# Patient Record
Sex: FEMALE | Race: WHITE | NOT HISPANIC OR LATINO | Employment: OTHER | URBAN - METROPOLITAN AREA
[De-identification: names, ages, dates, MRNs, and addresses within clinical notes are randomized per-mention and may not be internally consistent; named-entity substitution may affect disease eponyms.]

---

## 2017-02-21 ENCOUNTER — GENERIC CONVERSION - ENCOUNTER (OUTPATIENT)
Dept: OTHER | Facility: OTHER | Age: 72
End: 2017-02-21

## 2017-03-27 ENCOUNTER — ALLSCRIPTS OFFICE VISIT (OUTPATIENT)
Dept: OTHER | Facility: OTHER | Age: 72
End: 2017-03-27

## 2017-06-29 ENCOUNTER — ALLSCRIPTS OFFICE VISIT (OUTPATIENT)
Dept: OTHER | Facility: OTHER | Age: 72
End: 2017-06-29

## 2017-06-29 DIAGNOSIS — R53.83 OTHER FATIGUE: ICD-10-CM

## 2017-06-29 DIAGNOSIS — R91.1 SOLITARY PULMONARY NODULE: ICD-10-CM

## 2017-06-29 DIAGNOSIS — E78.5 HYPERLIPIDEMIA: ICD-10-CM

## 2017-09-29 ENCOUNTER — GENERIC CONVERSION - ENCOUNTER (OUTPATIENT)
Dept: OTHER | Facility: OTHER | Age: 72
End: 2017-09-29

## 2017-12-21 ENCOUNTER — ALLSCRIPTS OFFICE VISIT (OUTPATIENT)
Dept: OTHER | Facility: OTHER | Age: 72
End: 2017-12-21

## 2017-12-22 NOTE — PROGRESS NOTES
Assessment   1  COPD, moderate (496) (J44 9)   2  Oral thrush (112 0) (B37 0)   3  Low back pain (724 2) (M54 5)    Plan   COPD, moderate    · Combivent Respimat  MCG/ACT Inhalation Aerosol Solution; INHALE 1    PUFF 4 TIMES DAILY (MAXIMUM OF 6 PUFFS IN 24 HOURS)   · PredniSONE 10 MG Oral Tablet; 4 tablets daily for 3 days, then    3 tablets daily for 3 days, then    2 tablets daily for 3 days, then    1 tablet daily for 3 days   · Stiolto Respimat 2 5-2 5 MCG/ACT Inhalation Aerosol Solution; USE 1 PUFF DAILY  Low back pain    · Hydrocodone-Acetaminophen  MG Oral Tablet; TAKE 1 TABLET EVERY 4    TO 6 HOURS AS NEEDED FOR PAIN  Oral thrush    · Clotrimazole 10 MG Mouth/Throat Javier; ALLOW 1 TO DISSOLVE SLOWLY IN    MOUTH 5 TIMES DAILY AS DIRECTED  Other acute sinusitis    · Doxycycline Hyclate 100 MG Oral Capsule; TAKE 1 CAPSULE EVERY 12 HOURS    DAILY    Discussion/Summary   Possible side effects of new medications were reviewed with the patient/guardian today  The treatment plan was reviewed with the patient/guardian  The patient/guardian understands and agrees with the treatment plan      Chief Complaint   PT STATES WAS RECENTLY HOSPITALIZED FOR COPD  STATES HAS CUT BACK ON SMOKING  Patient is here today for follow up of chronic conditions described in HPI  History of Present Illness   She was recently hospitalized for a COPD exacerbation  She was discharged on steroids and antiviral medication  She was given a rescue inhaler, but she does not care for it as it causes her to have palpitations  Currently, she has no shortness of breath, wheezing, cough, or sputum production  She has drastically cut down on the amount that she smokes with plans to discontinue it altogether    has chronic back pain for which she takes hydrocodone  She has no side effects from her medication and she gets good relief from her current regimen  She has a son treatment agreement in the chart   I have queried the [place] prescription drug monitoring program and we are the only provider prescribing this medication for her  Review of Systems        Constitutional: No fever, no chills, feels well, no tiredness, no recent weight gain or weight loss  Eyes: No complaints of eye pain, no red eyes, no eyesight problems, no discharge, no dry eyes, no itching of eyes  ENT: no complaints of earache, no loss of hearing, no nose bleeds, no nasal discharge, no sore throat, no hoarseness  Cardiovascular: No complaints of slow heart rate, no fast heart rate, no chest pain, no palpitations, no leg claudication, no lower extremity edema  Respiratory: as noted in HPI  Gastrointestinal: No complaints of abdominal pain, no constipation, no nausea or vomiting, no diarrhea, no bloody stools  Genitourinary: No complaints of dysuria, no incontinence, no pelvic pain, no dysmenorrhea, no vaginal discharge or bleeding  Musculoskeletal: No complaints of arthralgias, no myalgias, no joint swelling or stiffness, no limb pain or swelling  Integumentary: No complaints of skin rash or lesions, no itching, no skin wounds, no breast pain or lump  Neurological: No complaints of headache, no confusion, no convulsions, no numbness, no dizziness or fainting, no tingling, no limb weakness, no difficulty walking  Psychiatric: Not suicidal, no sleep disturbance, no anxiety or depression, no change in personality, no emotional problems  Endocrine: No complaints of proptosis, no hot flashes, no muscle weakness, no deepening of the voice, no feelings of weakness  Hematologic/Lymphatic: No complaints of swollen glands, no swollen glands in the neck, does not bleed easily, does not bruise easily  Active Problems   1  Abdominal pain, epigastric (789 06) (R10 13)   2  Abnormal mammogram (793 80) (R92 8)   3  Acute sinusitis (461 9) (J01 90)   4  Depression with anxiety (300 4) (F41 8)   5   GERD (gastroesophageal reflux disease) (530 81) (K21 9)   6  Denied: History of substance abuse   7  Hyperlipidemia (272 4) (E78 5)   8  Hypertension (401 9) (I10)   9  Low back pain (724 2) (M54 5)   10  Major depression, recurrent, chronic (296 30) (F33 9)   11  Memory loss (780 93) (R41 3)   12  Other acute sinusitis (461 8) (J01 80)   13  Other fatigue (780 79) (R53 83)   14  Palpitations (785 1) (R00 2)   15  Pulmonary nodule seen on imaging study (793 11) (R91 1)   16  Right hip pain (719 45) (M25 551)   17  Shortness of breath (786 05) (R06 02)   18  Thyroid nodule (241 0) (E04 1)   19  Tobacco use (305 1) (Z72 0)   20  Transient alteration of awareness (780 02) (R40 4)   21  Vision changes (368 9) (H53 9)   22  Vitamin D deficiency (268 9) (E55 9)    Past Medical History   1  History of Elevated liver enzymes (790 5) (R74 8)   2  History of fracture of rib (V15 51) (Z87 81)   3  Denied: History of substance abuse    Surgical History   1  History of Appendectomy   2  History of Back Surgery   3  History of Cholecystectomy   4  History of Foot Surgery   5  History of Hip Surgery   6  History of Total Abdominal Hysterectomy    Family History   Mother    1  Family history of cardiac disorder (V17 49) (Z82 49)   2  Family history of type 2 diabetes mellitus (V18 0) (Z83 3)  Father    3  Family history of cardiac disorder (V17 49) (Z82 49)  Family History    4  Denied: Family history of mental disorder   5  Denied: Family history of substance abuse    Social History    · Current every day smoker (305 1) (F17 200)   · Tobacco use (305 1) (Z72 0)    Current Meds    1  Atenolol 25 MG Oral Tablet; TAKE 1 TABLET DAILY AS DIRECTED; Therapy: [id number] to [phone])  Requested for: 24Apr2017; Last     Rx:[id number] Ordered   2  Atorvastatin Calcium 40 MG Oral Tablet; Take 1 tablet daily  Requested for: 37JTZ3434;     Last Rx:97Vfc3094 Ordered   3   DiazePAM 5 MG Oral Tablet; take 1 tablet by mouth three times a day if needed; Therapy: 94YFK4862 to (Evaluate:50Uyg2538)  Requested for: 93Xjo8711; Last     Rx:12Mkg7389 Ordered   4  Effexor XR 75 MG Oral Capsule Extended Release 24 Hour; TAKE 3 CAPSULES DAILY     (TOTAL DAILY DOSE  MG); Therapy: 46GZZ9243 to (Last Rx:06Mar2017)  Requested for: 41PAJ0021 Ordered   5  Hydrocodone-Acetaminophen  MG Oral Tablet; TAKE 1 TABLET EVERY 4 TO 6     HOURS AS NEEDED FOR PAIN; Last Rx:06Rdo1170 Ordered   6  Magnesium 400 MG Oral Tablet; Take 1 tablet daily; Therapy: (Kristen Galloway) to Recorded   7  PriLOSEC 20 MG CPDR; TAKE 1 CAPSULE TWICE DAILY; Therapy: (Chester Laverne) to Recorded   8  Venlafaxine HCl ER 75 MG Oral Capsule Extended Release 24 Hour; TAKE 3 CAPSULES     DAILY (TOTAL DAILY DOSE  MG); Therapy: 52AOF3276 to (Evaluate:09Apr2018)  Requested for: 33AVT2729; Last     Rx:18Yyr1979 Ordered   9  Vitamin D (Ergocalciferol) 04521 UNIT Oral Capsule; TAKE 1 CAPSULE WEEKLY; Therapy: 12Apr2016 to (Last Rx:12Apr2016)  Requested for: 24Ygv1864 Ordered    Allergies   1  No Known Drug Allergies    Vitals   Vital Signs    Recorded: 21Dec2017 09:53AM   Heart Rate 81   Respiration 16   Systolic 530   Diastolic 80   Height 5 ft 4 in   Weight 151 lb 2 oz   BMI Calculated 25 94   BSA Calculated 1 74   O2 Saturation 92     Physical Exam        Constitutional      General appearance: No acute distress, well appearing and well nourished  Pulmonary      Respiratory effort: No increased work of breathing or signs of respiratory distress  Auscultation of lungs: Clear to auscultation  Cardiovascular      Auscultation of heart: Normal rate and rhythm, normal S1 and S2, without murmurs  Examination of extremities for edema and/or varicosities: Normal        Abdomen      Abdomen: Non-tender, no masses  Liver and spleen: No hepatomegaly or splenomegaly            Signatures    Electronically signed by : Macy Valiente MD; Dec 21 2017 10:34AM EST                       (Author)

## 2018-01-10 NOTE — MISCELLANEOUS
February 21, 2017    To Whom It May Concern,    This is to serve as a letter of medical necessity  Ms Olvin Miguel is taking venlafaxine 225 mg daily for a history of depression  She has failed a number of other antidepressants  with other providers  She still had anxiety and depression on lower doses of venlafaxine, but her symptoms are well controlled on 225 mg  Please contact our office with any questions or concerns  Sincerely,    KAYLEN Orr          Electronically signed by:Serge Saab MD  Feb 21 2017  2:46PM EST

## 2018-01-14 VITALS
BODY MASS INDEX: 25.99 KG/M2 | RESPIRATION RATE: 15 BRPM | SYSTOLIC BLOOD PRESSURE: 142 MMHG | WEIGHT: 152.25 LBS | HEART RATE: 66 BPM | OXYGEN SATURATION: 95 % | HEIGHT: 64 IN | DIASTOLIC BLOOD PRESSURE: 80 MMHG

## 2018-01-14 VITALS
DIASTOLIC BLOOD PRESSURE: 64 MMHG | HEART RATE: 56 BPM | OXYGEN SATURATION: 95 % | RESPIRATION RATE: 15 BRPM | HEIGHT: 64 IN | WEIGHT: 148.25 LBS | BODY MASS INDEX: 25.31 KG/M2 | SYSTOLIC BLOOD PRESSURE: 130 MMHG

## 2018-01-22 VITALS
BODY MASS INDEX: 25.86 KG/M2 | RESPIRATION RATE: 13 BRPM | HEIGHT: 64 IN | DIASTOLIC BLOOD PRESSURE: 72 MMHG | SYSTOLIC BLOOD PRESSURE: 134 MMHG | WEIGHT: 151.5 LBS | OXYGEN SATURATION: 97 % | HEART RATE: 70 BPM

## 2018-01-23 VITALS
BODY MASS INDEX: 25.8 KG/M2 | DIASTOLIC BLOOD PRESSURE: 80 MMHG | RESPIRATION RATE: 16 BRPM | HEIGHT: 64 IN | HEART RATE: 81 BPM | SYSTOLIC BLOOD PRESSURE: 128 MMHG | WEIGHT: 151.13 LBS | OXYGEN SATURATION: 92 %

## 2018-02-19 DIAGNOSIS — F41.9 ANXIETY: Primary | ICD-10-CM

## 2018-02-19 RX ORDER — DIAZEPAM 5 MG/1
1 TABLET ORAL 3 TIMES DAILY PRN
COMMUNITY
Start: 2016-02-24 | End: 2018-02-19 | Stop reason: SDUPTHER

## 2018-02-19 RX ORDER — DIAZEPAM 5 MG/1
5 TABLET ORAL 3 TIMES DAILY PRN
Qty: 30 TABLET | Refills: 5 | Status: SHIPPED | OUTPATIENT
Start: 2018-02-19 | End: 2018-02-27 | Stop reason: SDUPTHER

## 2018-02-26 ENCOUNTER — TELEPHONE (OUTPATIENT)
Dept: FAMILY MEDICINE CLINIC | Facility: CLINIC | Age: 73
End: 2018-02-26

## 2018-02-26 DIAGNOSIS — F41.9 ANXIETY: ICD-10-CM

## 2018-02-27 RX ORDER — DIAZEPAM 5 MG/1
5 TABLET ORAL 3 TIMES DAILY PRN
Qty: 90 TABLET | Refills: 5 | Status: SHIPPED | OUTPATIENT
Start: 2018-02-27 | End: 2018-03-22 | Stop reason: SDUPTHER

## 2018-03-21 RX ORDER — OMEPRAZOLE 20 MG/1
1 CAPSULE, DELAYED RELEASE ORAL 2 TIMES DAILY
COMMUNITY
End: 2018-03-22 | Stop reason: SDUPTHER

## 2018-03-21 RX ORDER — PREDNISONE 10 MG/1
TABLET ORAL
COMMUNITY
Start: 2017-12-21 | End: 2018-06-11

## 2018-03-21 RX ORDER — ATENOLOL 25 MG/1
1 TABLET ORAL DAILY
COMMUNITY
Start: 2015-10-01 | End: 2018-03-22

## 2018-03-21 RX ORDER — OSELTAMIVIR PHOSPHATE 30 MG/1
CAPSULE ORAL
Refills: 0 | COMMUNITY
Start: 2017-12-15 | End: 2018-06-11

## 2018-03-21 RX ORDER — THIAMINE HCL 100 MG
1 TABLET ORAL DAILY
COMMUNITY

## 2018-03-21 RX ORDER — NICOTINE 21 MG/24HR
PATCH, TRANSDERMAL 24 HOURS TRANSDERMAL
Refills: 0 | COMMUNITY
Start: 2017-12-15 | End: 2018-06-11

## 2018-03-21 RX ORDER — HYDROCODONE BITARTRATE AND ACETAMINOPHEN 10; 325 MG/1; MG/1
1 TABLET ORAL
COMMUNITY
End: 2018-03-22 | Stop reason: SDUPTHER

## 2018-03-21 RX ORDER — IPRATROPIUM/ALBUTEROL SULFATE 20-100 MCG
MIST INHALER (GRAM) INHALATION
Refills: 0 | COMMUNITY
Start: 2018-01-22 | End: 2019-10-08 | Stop reason: SDUPTHER

## 2018-03-21 RX ORDER — ATORVASTATIN CALCIUM 40 MG/1
1 TABLET, FILM COATED ORAL DAILY
COMMUNITY
End: 2018-09-11

## 2018-03-21 RX ORDER — ERGOCALCIFEROL 1.25 MG/1
1 CAPSULE ORAL WEEKLY
COMMUNITY
Start: 2016-04-12 | End: 2018-03-22 | Stop reason: SDUPTHER

## 2018-03-21 RX ORDER — DOXYCYCLINE HYCLATE 100 MG/1
1 CAPSULE ORAL EVERY 12 HOURS
COMMUNITY
Start: 2016-01-04 | End: 2018-06-11

## 2018-03-21 RX ORDER — VENLAFAXINE HYDROCHLORIDE 75 MG/1
CAPSULE, EXTENDED RELEASE ORAL
COMMUNITY
Start: 2017-07-10 | End: 2018-04-25 | Stop reason: SDUPTHER

## 2018-03-22 ENCOUNTER — OFFICE VISIT (OUTPATIENT)
Dept: FAMILY MEDICINE CLINIC | Facility: CLINIC | Age: 73
End: 2018-03-22
Payer: COMMERCIAL

## 2018-03-22 VITALS
DIASTOLIC BLOOD PRESSURE: 82 MMHG | SYSTOLIC BLOOD PRESSURE: 134 MMHG | RESPIRATION RATE: 16 BRPM | BODY MASS INDEX: 27.18 KG/M2 | HEART RATE: 63 BPM | OXYGEN SATURATION: 95 % | HEIGHT: 64 IN | WEIGHT: 159.2 LBS

## 2018-03-22 DIAGNOSIS — E78.49 OTHER HYPERLIPIDEMIA: ICD-10-CM

## 2018-03-22 DIAGNOSIS — F41.9 ANXIETY: ICD-10-CM

## 2018-03-22 DIAGNOSIS — I10 ESSENTIAL HYPERTENSION: Primary | ICD-10-CM

## 2018-03-22 DIAGNOSIS — R52 PAIN: ICD-10-CM

## 2018-03-22 DIAGNOSIS — E55.9 VITAMIN D DEFICIENCY: Primary | ICD-10-CM

## 2018-03-22 DIAGNOSIS — K21.9 GASTROESOPHAGEAL REFLUX DISEASE, ESOPHAGITIS PRESENCE NOT SPECIFIED: ICD-10-CM

## 2018-03-22 DIAGNOSIS — Z12.39 SCREENING FOR BREAST CANCER: Primary | ICD-10-CM

## 2018-03-22 DIAGNOSIS — Z13.89 SCREENING FOR GENITOURINARY CONDITION: ICD-10-CM

## 2018-03-22 DIAGNOSIS — Z13.89 SCREENING FOR NEUROLOGICAL CONDITION: ICD-10-CM

## 2018-03-22 PROCEDURE — 1101F PT FALLS ASSESS-DOCD LE1/YR: CPT | Performed by: FAMILY MEDICINE

## 2018-03-22 PROCEDURE — 99214 OFFICE O/P EST MOD 30 MIN: CPT | Performed by: FAMILY MEDICINE

## 2018-03-22 RX ORDER — HYDROCODONE BITARTRATE AND ACETAMINOPHEN 10; 325 MG/1; MG/1
1 TABLET ORAL EVERY 4 HOURS PRN
Qty: 150 TABLET | Refills: 0 | Status: SHIPPED | OUTPATIENT
Start: 2018-03-22 | End: 2018-03-22 | Stop reason: SDUPTHER

## 2018-03-22 RX ORDER — DIAZEPAM 5 MG/1
5 TABLET ORAL 3 TIMES DAILY PRN
Qty: 90 TABLET | Refills: 5 | Status: SHIPPED | OUTPATIENT
Start: 2018-03-22 | End: 2018-08-27 | Stop reason: SDUPTHER

## 2018-03-22 RX ORDER — ERGOCALCIFEROL 1.25 MG/1
50000 CAPSULE ORAL WEEKLY
Qty: 12 CAPSULE | Refills: 3 | Status: SHIPPED | OUTPATIENT
Start: 2018-03-22 | End: 2018-06-11

## 2018-03-22 RX ORDER — HYDROCODONE BITARTRATE AND ACETAMINOPHEN 10; 325 MG/1; MG/1
1 TABLET ORAL EVERY 4 HOURS PRN
Qty: 150 TABLET | Refills: 0 | Status: SHIPPED | OUTPATIENT
Start: 2018-03-22 | End: 2018-06-11 | Stop reason: SDUPTHER

## 2018-03-22 RX ORDER — OMEPRAZOLE 20 MG/1
20 CAPSULE, DELAYED RELEASE ORAL 2 TIMES DAILY
Qty: 180 CAPSULE | Refills: 3 | Status: SHIPPED | OUTPATIENT
Start: 2018-03-22 | End: 2019-01-17 | Stop reason: SDUPTHER

## 2018-03-22 RX ORDER — HYDROCODONE BITARTRATE AND ACETAMINOPHEN 10; 325 MG/1; MG/1
1 TABLET ORAL EVERY 6 HOURS PRN
Qty: 120 TABLET | Refills: 0 | Status: SHIPPED | OUTPATIENT
Start: 2018-03-22 | End: 2018-03-22 | Stop reason: SDUPTHER

## 2018-03-22 RX ORDER — METOPROLOL SUCCINATE 50 MG/1
25 TABLET, EXTENDED RELEASE ORAL DAILY
Qty: 30 TABLET | Refills: 5 | Status: SHIPPED | OUTPATIENT
Start: 2018-03-22 | End: 2018-09-11

## 2018-03-22 NOTE — PROGRESS NOTES
Assessment/Plan:    No problem-specific Assessment & Plan notes found for this encounter  Diagnoses and all orders for this visit:    Essential hypertension  -     metoprolol succinate (TOPROL-XL) 50 mg 24 hr tablet; Take 0 5 tablets (25 mg total) by mouth daily  -     Lipid panel; Future  -     Comprehensive metabolic panel; Future  -     TSH, 3rd generation; Future  -     Microalbumin / creatinine urine ratio    Other hyperlipidemia  -     Lipid panel; Future  -     Comprehensive metabolic panel; Future  -     TSH, 3rd generation; Future  -     Microalbumin / creatinine urine ratio    Pain  -     Discontinue: HYDROcodone-acetaminophen (NORCO)  mg per tablet; Take 1 tablet by mouth every 6 (six) hours as needed for moderate pain Max Daily Amount: 4 tablets  -     Discontinue: HYDROcodone-acetaminophen (NORCO)  mg per tablet; Take 1 tablet by mouth every 4 (four) hours as needed for moderate pain Max Daily Amount: 6 tablets  -     Discontinue: HYDROcodone-acetaminophen (NORCO)  mg per tablet; Take 1 tablet by mouth every 4 (four) hours as needed for moderate pain Max Daily Amount: 6 tablets  -     HYDROcodone-acetaminophen (NORCO)  mg per tablet; Take 1 tablet by mouth every 4 (four) hours as needed for moderate pain Max Daily Amount: 6 tablets    Anxiety  -     diazepam (VALIUM) 5 mg tablet; Take 1 tablet (5 mg total) by mouth 3 (three) times a day as needed for anxiety    Screening for genitourinary condition    Screening for neurological condition    Other orders  -     ergocalciferol (VITAMIN D2) 50,000 units; Take 1 capsule by mouth once a week  -     venlafaxine (EFFEXOR-XR) 75 mg 24 hr capsule; Take by mouth  -     Tiotropium Bromide-Olodaterol (STIOLTO RESPIMAT) 2 5-2 5 MCG/ACT AERS; Inhale 1 puff daily  -     omeprazole (PRILOSEC) 20 mg delayed release capsule; Take 1 capsule by mouth 2 (two) times a day  -     predniSONE 10 mg tablet;  Take by mouth  -     oseltamivir (TAMIFLU) 30 MG capsule; take 1 capsule by mouth twice a day for 3 days  -     nicotine (NICODERM CQ) 21 mg/24 hr TD 24 hr patch; place 1 patch topically ON THE SKIN daily  -     Magnesium 500 MG TABS; Take 1 tablet by mouth daily  -     COMBIVENT RESPIMAT inhaler; inhale 1 puff by mouth four times a day MAX OF 6 PUFFS IN 24 HOURS  -     Discontinue: HYDROcodone-acetaminophen (NORCO)  mg per tablet; Take 1 tablet by mouth  -     fluticasone-salmeterol (ADVAIR) 250-50 mcg/dose inhaler; Inhale 1 puff  -     doxycycline hyclate (VIBRAMYCIN) 100 mg capsule; Take 1 capsule by mouth every 12 (twelve) hours  -     clotrimazole (MYCELEX) 10 mg shante; Apply to the mouth or throat 5 (five) times a day  -     Discontinue: atenolol (TENORMIN) 25 mg tablet; Take 1 tablet by mouth daily  -     atorvastatin (LIPITOR) 40 mg tablet; Take 1 tablet by mouth daily  -     Ipratropium-Albuterol (COMBIVENT IN); Inhale 2 puffs          Subjective:      Patient ID: Juan Gonsalez is a 81 y o  female  She is doing well overall  She has no CP or SOB  She has no HA or vision changes  Her BP is well controlled on her current regimen  She has chronic pain (back and knee) and has been on Vicodin for many years  She does well with her current regimen and has no side effects  She has a signed treatment agreement in the chart and urine toxicology consistent with regular use of prescribed medications  The following portions of the patient's history were reviewed and updated as appropriate:   She  has a past medical history of Elevated liver enzymes and Fracture of rib  She   Patient Active Problem List    Diagnosis Date Noted    Essential hypertension 03/22/2018    Other hyperlipidemia 03/22/2018    Screening for genitourinary condition 03/22/2018    Screening for neurological condition 03/22/2018     She  has a past surgical history that includes Appendectomy; Back surgery; Cholecystectomy; Foot surgery;  Total hip arthroplasty (Right); and Total abdominal hysterectomy  Her family history includes Diabetes type II in her mother; Heart disease in her father and mother  She  reports that she has been smoking  She has never used smokeless tobacco  She reports that she does not drink alcohol or use drugs    Current Outpatient Prescriptions   Medication Sig Dispense Refill    atorvastatin (LIPITOR) 40 mg tablet Take 1 tablet by mouth daily      COMBIVENT RESPIMAT inhaler inhale 1 puff by mouth four times a day MAX OF 6 PUFFS IN 24 HOURS  0    diazepam (VALIUM) 5 mg tablet Take 1 tablet (5 mg total) by mouth 3 (three) times a day as needed for anxiety 90 tablet 5    ergocalciferol (VITAMIN D2) 50,000 units Take 1 capsule by mouth once a week      HYDROcodone-acetaminophen (NORCO)  mg per tablet Take 1 tablet by mouth every 4 (four) hours as needed for moderate pain Max Daily Amount: 6 tablets 150 tablet 0    Ipratropium-Albuterol (COMBIVENT IN) Inhale 2 puffs      Magnesium 500 MG TABS Take 1 tablet by mouth daily      omeprazole (PRILOSEC) 20 mg delayed release capsule Take 1 capsule by mouth 2 (two) times a day      Tiotropium Bromide-Olodaterol (STIOLTO RESPIMAT) 2 5-2 5 MCG/ACT AERS Inhale 1 puff daily      venlafaxine (EFFEXOR-XR) 75 mg 24 hr capsule Take by mouth      clotrimazole (MYCELEX) 10 mg shante Apply to the mouth or throat 5 (five) times a day      doxycycline hyclate (VIBRAMYCIN) 100 mg capsule Take 1 capsule by mouth every 12 (twelve) hours      fluticasone-salmeterol (ADVAIR) 250-50 mcg/dose inhaler Inhale 1 puff      metoprolol succinate (TOPROL-XL) 50 mg 24 hr tablet Take 0 5 tablets (25 mg total) by mouth daily 30 tablet 5    nicotine (NICODERM CQ) 21 mg/24 hr TD 24 hr patch place 1 patch topically ON THE SKIN daily  0    oseltamivir (TAMIFLU) 30 MG capsule take 1 capsule by mouth twice a day for 3 days  0    predniSONE 10 mg tablet Take by mouth       No current facility-administered medications for this visit  Current Outpatient Prescriptions on File Prior to Visit   Medication Sig    [DISCONTINUED] diazepam (VALIUM) 5 mg tablet Take 1 tablet (5 mg total) by mouth 3 (three) times a day as needed for anxiety     No current facility-administered medications on file prior to visit  She has No Known Allergies       Review of Systems   All other systems reviewed and are negative  Objective:      /82 (BP Location: Right arm, Patient Position: Sitting, Cuff Size: Standard)   Pulse 63   Resp 16   Ht 5' 4" (1 626 m)   Wt 72 2 kg (159 lb 3 2 oz)   SpO2 95%   BMI 27 33 kg/m²          Physical Exam   Constitutional: She is oriented to person, place, and time  She appears well-developed  Neck: Normal range of motion  Neck supple  Cardiovascular: Normal rate, regular rhythm, normal heart sounds and intact distal pulses  Pulmonary/Chest: Effort normal    Abdominal: Soft  Bowel sounds are normal    Musculoskeletal: Normal range of motion  Neurological: She is alert and oriented to person, place, and time  She has normal reflexes  Skin: Skin is warm and dry  Psychiatric: She has a normal mood and affect  Her behavior is normal  Judgment and thought content normal    Nursing note and vitals reviewed

## 2018-04-25 DIAGNOSIS — F32.A DEPRESSION, UNSPECIFIED DEPRESSION TYPE: Primary | ICD-10-CM

## 2018-04-26 RX ORDER — VENLAFAXINE HYDROCHLORIDE 75 MG/1
CAPSULE, EXTENDED RELEASE ORAL
Qty: 90 CAPSULE | Refills: 3 | Status: SHIPPED | OUTPATIENT
Start: 2018-04-26 | End: 2018-08-16 | Stop reason: SDUPTHER

## 2018-06-11 ENCOUNTER — OFFICE VISIT (OUTPATIENT)
Dept: FAMILY MEDICINE CLINIC | Facility: CLINIC | Age: 73
End: 2018-06-11
Payer: COMMERCIAL

## 2018-06-11 VITALS
DIASTOLIC BLOOD PRESSURE: 82 MMHG | HEIGHT: 64 IN | OXYGEN SATURATION: 97 % | SYSTOLIC BLOOD PRESSURE: 142 MMHG | HEART RATE: 84 BPM | BODY MASS INDEX: 26.26 KG/M2 | WEIGHT: 153.8 LBS | RESPIRATION RATE: 15 BRPM

## 2018-06-11 DIAGNOSIS — R31.21 ASYMPTOMATIC MICROSCOPIC HEMATURIA: ICD-10-CM

## 2018-06-11 DIAGNOSIS — R52 PAIN: ICD-10-CM

## 2018-06-11 DIAGNOSIS — R21 RASH OF FACE: ICD-10-CM

## 2018-06-11 DIAGNOSIS — I10 ESSENTIAL HYPERTENSION: ICD-10-CM

## 2018-06-11 DIAGNOSIS — J30.9 ALLERGIC RHINITIS, UNSPECIFIED SEASONALITY, UNSPECIFIED TRIGGER: Primary | ICD-10-CM

## 2018-06-11 DIAGNOSIS — E78.5 HYPERLIPIDEMIA, UNSPECIFIED HYPERLIPIDEMIA TYPE: ICD-10-CM

## 2018-06-11 PROCEDURE — 99214 OFFICE O/P EST MOD 30 MIN: CPT | Performed by: FAMILY MEDICINE

## 2018-06-11 PROCEDURE — 3725F SCREEN DEPRESSION PERFORMED: CPT | Performed by: FAMILY MEDICINE

## 2018-06-11 RX ORDER — HYDROCODONE BITARTRATE AND ACETAMINOPHEN 10; 325 MG/1; MG/1
1 TABLET ORAL EVERY 4 HOURS PRN
Qty: 150 TABLET | Refills: 0 | Status: SHIPPED | OUTPATIENT
Start: 2018-06-11 | End: 2018-07-26 | Stop reason: SDUPTHER

## 2018-06-11 RX ORDER — AZELASTINE 1 MG/ML
1 SPRAY, METERED NASAL 2 TIMES DAILY
Qty: 30 ML | Refills: 5 | Status: SHIPPED | OUTPATIENT
Start: 2018-06-11 | End: 2022-01-21 | Stop reason: SDUPTHER

## 2018-06-11 NOTE — PROGRESS NOTES
Assessment/Plan:    No problem-specific Assessment & Plan notes found for this encounter  Diagnoses and all orders for this visit:    Allergic rhinitis, unspecified seasonality, unspecified trigger  -     azelastine (ASTELIN) 0 1 % nasal spray; 1 spray into each nostril 2 (two) times a day Use in each nostril as directed    Pain  -     HYDROcodone-acetaminophen (NORCO)  mg per tablet; Take 1 tablet by mouth every 4 (four) hours as needed for moderate pain Max Daily Amount: 6 tablets    Essential hypertension  -     US retroperitoneal complete; Future    Hyperlipidemia, unspecified hyperlipidemia type          Subjective:      Patient ID: Olvin Jaquez is a 72 y o  female  Her BP is well controlled on her current regimen  She has no CP or SOB  She has no HA or vision changes  She has seasonal allergies  She does not care for Flonase due to side effects (headache)  She has a family history of renal disease (medullary sponge kidney vs polycystic kidney disease)  She has chronic low back pain  She has been on Vicodin for many years  She gets good relief and no side effects  She has a signed treatment agreement in the chart  She is aware of the risks and benefits of long-term use of opioid pain medication  She and I are in agreement that in her case, the benefits outweigh the risks  She stopped her statin medication due to concern of memory loss  She continues to smoke  She is not interested in quitting  The following portions of the patient's history were reviewed and updated as appropriate:   She  has a past medical history of Elevated liver enzymes and Fracture of rib    She   Patient Active Problem List    Diagnosis Date Noted    Asymptomatic microscopic hematuria 06/11/2018    Essential hypertension 03/22/2018    Other hyperlipidemia 03/22/2018    Screening for genitourinary condition 03/22/2018    Screening for neurological condition 03/22/2018    Hyperlipidemia 08/31/2015    Hypertension 08/31/2015     She  has a past surgical history that includes Appendectomy; Back surgery; Cholecystectomy; Foot surgery; Total hip arthroplasty (Right); and Total abdominal hysterectomy  Her family history includes Diabetes type II in her mother; Heart disease in her father and mother  She  reports that she has been smoking  She has never used smokeless tobacco  She reports that she does not drink alcohol or use drugs  Current Outpatient Prescriptions   Medication Sig Dispense Refill    COMBIVENT RESPIMAT inhaler inhale 1 puff by mouth four times a day MAX OF 6 PUFFS IN 24 HOURS  0    diazepam (VALIUM) 5 mg tablet Take 1 tablet (5 mg total) by mouth 3 (three) times a day as needed for anxiety 90 tablet 5    HYDROcodone-acetaminophen (NORCO)  mg per tablet Take 1 tablet by mouth every 4 (four) hours as needed for moderate pain Max Daily Amount: 6 tablets 150 tablet 0    Magnesium 500 MG TABS Take 1 tablet by mouth daily      metoprolol succinate (TOPROL-XL) 50 mg 24 hr tablet Take 0 5 tablets (25 mg total) by mouth daily 30 tablet 5    omeprazole (PRILOSEC) 20 mg delayed release capsule Take 1 capsule (20 mg total) by mouth 2 (two) times a day 180 capsule 3    Tiotropium Bromide-Olodaterol (STIOLTO RESPIMAT) 2 5-2 5 MCG/ACT AERS Inhale 1 puff daily      venlafaxine (EFFEXOR-XR) 75 mg 24 hr capsule TAKE 3 CAPSULES DAILY (TOTAL DAILY DOSE  MG) 90 capsule 3    atorvastatin (LIPITOR) 40 mg tablet Take 1 tablet by mouth daily      azelastine (ASTELIN) 0 1 % nasal spray 1 spray into each nostril 2 (two) times a day Use in each nostril as directed 30 mL 5    clotrimazole (MYCELEX) 10 mg shante Apply to the mouth or throat 5 (five) times a day       No current facility-administered medications for this visit        Current Outpatient Prescriptions on File Prior to Visit   Medication Sig    COMBIVENT RESPIMAT inhaler inhale 1 puff by mouth four times a day MAX OF 6 PUFFS IN 24 HOURS    diazepam (VALIUM) 5 mg tablet Take 1 tablet (5 mg total) by mouth 3 (three) times a day as needed for anxiety    Magnesium 500 MG TABS Take 1 tablet by mouth daily    metoprolol succinate (TOPROL-XL) 50 mg 24 hr tablet Take 0 5 tablets (25 mg total) by mouth daily    omeprazole (PRILOSEC) 20 mg delayed release capsule Take 1 capsule (20 mg total) by mouth 2 (two) times a day    Tiotropium Bromide-Olodaterol (STIOLTO RESPIMAT) 2 5-2 5 MCG/ACT AERS Inhale 1 puff daily    venlafaxine (EFFEXOR-XR) 75 mg 24 hr capsule TAKE 3 CAPSULES DAILY (TOTAL DAILY DOSE  MG)    [DISCONTINUED] HYDROcodone-acetaminophen (NORCO)  mg per tablet Take 1 tablet by mouth every 4 (four) hours as needed for moderate pain Max Daily Amount: 6 tablets    atorvastatin (LIPITOR) 40 mg tablet Take 1 tablet by mouth daily    clotrimazole (MYCELEX) 10 mg shante Apply to the mouth or throat 5 (five) times a day    [DISCONTINUED] doxycycline hyclate (VIBRAMYCIN) 100 mg capsule Take 1 capsule by mouth every 12 (twelve) hours    [DISCONTINUED] ergocalciferol (VITAMIN D2) 50,000 units Take 1 capsule (50,000 Units total) by mouth once a week    [DISCONTINUED] fluticasone-salmeterol (ADVAIR) 250-50 mcg/dose inhaler Inhale 1 puff    [DISCONTINUED] Ipratropium-Albuterol (COMBIVENT IN) Inhale 2 puffs    [DISCONTINUED] nicotine (NICODERM CQ) 21 mg/24 hr TD 24 hr patch place 1 patch topically ON THE SKIN daily    [DISCONTINUED] oseltamivir (TAMIFLU) 30 MG capsule take 1 capsule by mouth twice a day for 3 days    [DISCONTINUED] predniSONE 10 mg tablet Take by mouth     No current facility-administered medications on file prior to visit  She has No Known Allergies       Review of Systems   All other systems reviewed and are negative          Objective:      /82 (BP Location: Right arm, Patient Position: Sitting, Cuff Size: Standard)   Pulse 84   Resp 15   Ht 5' 4" (1 626 m)   Wt 69 8 kg (153 lb 12 8 oz)   SpO2 97%   BMI 26 40 kg/m²          Physical Exam   Constitutional: She is oriented to person, place, and time  She appears well-developed and well-nourished  Neck: Normal range of motion  Neck supple  Cardiovascular: Normal rate, regular rhythm, normal heart sounds and intact distal pulses  Pulmonary/Chest: Effort normal and breath sounds normal    Abdominal: Soft  Bowel sounds are normal    Musculoskeletal: Normal range of motion  Neurological: She is alert and oriented to person, place, and time  She has normal reflexes  Skin: Skin is warm and dry  Psychiatric: She has a normal mood and affect  Her behavior is normal  Judgment and thought content normal    Nursing note and vitals reviewed

## 2018-06-11 NOTE — PATIENT INSTRUCTIONS

## 2018-07-05 ENCOUNTER — TELEPHONE (OUTPATIENT)
Dept: FAMILY MEDICINE CLINIC | Facility: CLINIC | Age: 73
End: 2018-07-05

## 2018-07-05 DIAGNOSIS — I10 ESSENTIAL HYPERTENSION: Primary | ICD-10-CM

## 2018-07-08 RX ORDER — ATENOLOL 50 MG/1
50 TABLET ORAL DAILY
Qty: 90 TABLET | Refills: 3 | Status: SHIPPED | OUTPATIENT
Start: 2018-07-08 | End: 2019-08-12 | Stop reason: SDUPTHER

## 2018-07-26 DIAGNOSIS — R52 PAIN: ICD-10-CM

## 2018-07-26 RX ORDER — HYDROCODONE BITARTRATE AND ACETAMINOPHEN 10; 325 MG/1; MG/1
1 TABLET ORAL EVERY 4 HOURS PRN
Qty: 150 TABLET | Refills: 0 | Status: SHIPPED | OUTPATIENT
Start: 2018-07-26 | End: 2018-08-27 | Stop reason: SDUPTHER

## 2018-08-16 DIAGNOSIS — F32.A DEPRESSION, UNSPECIFIED DEPRESSION TYPE: ICD-10-CM

## 2018-08-16 RX ORDER — VENLAFAXINE HYDROCHLORIDE 75 MG/1
225 CAPSULE, EXTENDED RELEASE ORAL DAILY
Qty: 90 CAPSULE | Refills: 5 | Status: SHIPPED | OUTPATIENT
Start: 2018-08-16 | End: 2018-08-27 | Stop reason: SDUPTHER

## 2018-08-27 DIAGNOSIS — F32.A DEPRESSION, UNSPECIFIED DEPRESSION TYPE: ICD-10-CM

## 2018-08-27 DIAGNOSIS — F41.9 ANXIETY: ICD-10-CM

## 2018-08-27 DIAGNOSIS — R52 PAIN: ICD-10-CM

## 2018-08-27 RX ORDER — VENLAFAXINE HYDROCHLORIDE 75 MG/1
225 CAPSULE, EXTENDED RELEASE ORAL DAILY
Qty: 90 CAPSULE | Refills: 0 | Status: SHIPPED | OUTPATIENT
Start: 2018-08-27 | End: 2019-04-28 | Stop reason: SDUPTHER

## 2018-08-27 RX ORDER — HYDROCODONE BITARTRATE AND ACETAMINOPHEN 10; 325 MG/1; MG/1
1 TABLET ORAL EVERY 4 HOURS PRN
Qty: 150 TABLET | Refills: 0 | Status: SHIPPED | OUTPATIENT
Start: 2018-08-27 | End: 2018-09-11 | Stop reason: SDUPTHER

## 2018-08-27 RX ORDER — DIAZEPAM 5 MG/1
5 TABLET ORAL 3 TIMES DAILY PRN
Qty: 90 TABLET | Refills: 0 | Status: SHIPPED | OUTPATIENT
Start: 2018-08-27 | End: 2018-09-11 | Stop reason: SDUPTHER

## 2018-09-11 ENCOUNTER — OFFICE VISIT (OUTPATIENT)
Dept: FAMILY MEDICINE CLINIC | Facility: CLINIC | Age: 73
End: 2018-09-11
Payer: COMMERCIAL

## 2018-09-11 VITALS
SYSTOLIC BLOOD PRESSURE: 122 MMHG | RESPIRATION RATE: 15 BRPM | HEIGHT: 64 IN | HEART RATE: 96 BPM | BODY MASS INDEX: 26.22 KG/M2 | DIASTOLIC BLOOD PRESSURE: 74 MMHG | WEIGHT: 153.6 LBS | OXYGEN SATURATION: 96 %

## 2018-09-11 DIAGNOSIS — I10 ESSENTIAL HYPERTENSION: ICD-10-CM

## 2018-09-11 DIAGNOSIS — E78.49 OTHER HYPERLIPIDEMIA: ICD-10-CM

## 2018-09-11 DIAGNOSIS — F41.9 ANXIETY: ICD-10-CM

## 2018-09-11 DIAGNOSIS — R87.614 CYTOLOGIC EVIDENCE OF MALIGNANCY ON SMEAR OF CERVIX: ICD-10-CM

## 2018-09-11 DIAGNOSIS — M51.9 LUMBAR DISC DISEASE: ICD-10-CM

## 2018-09-11 DIAGNOSIS — R91.8 PULMONARY NODULES: ICD-10-CM

## 2018-09-11 DIAGNOSIS — Z23 NEED FOR INFLUENZA VACCINATION: Primary | ICD-10-CM

## 2018-09-11 DIAGNOSIS — R52 PAIN: ICD-10-CM

## 2018-09-11 PROCEDURE — 3008F BODY MASS INDEX DOCD: CPT | Performed by: FAMILY MEDICINE

## 2018-09-11 PROCEDURE — 99214 OFFICE O/P EST MOD 30 MIN: CPT | Performed by: FAMILY MEDICINE

## 2018-09-11 RX ORDER — DIAZEPAM 5 MG/1
5 TABLET ORAL 3 TIMES DAILY PRN
Qty: 90 TABLET | Refills: 0 | Status: SHIPPED | OUTPATIENT
Start: 2018-09-11 | End: 2018-10-24 | Stop reason: SDUPTHER

## 2018-09-11 RX ORDER — HYDROCODONE BITARTRATE AND ACETAMINOPHEN 10; 325 MG/1; MG/1
1 TABLET ORAL EVERY 4 HOURS PRN
Qty: 150 TABLET | Refills: 0 | Status: SHIPPED | OUTPATIENT
Start: 2018-09-11 | End: 2018-10-16 | Stop reason: SDUPTHER

## 2018-09-11 NOTE — PROGRESS NOTES
Assessment/Plan:    No problem-specific Assessment & Plan notes found for this encounter  Diagnoses and all orders for this visit:    Need for influenza vaccination  -     influenza vaccine, 6043-3910, high-dose, PF 0 5 mL, for patients 65 yr+ (FLUZONE HIGH-DOSE)    Anxiety  -     diazepam (VALIUM) 5 mg tablet; Take 1 tablet (5 mg total) by mouth 3 (three) times a day as needed for anxiety    Pulmonary nodules  -     CT chest wo contrast; Future    Essential hypertension    Lumbar disc disease    Other hyperlipidemia    Pain  -     HYDROcodone-acetaminophen (NORCO)  mg per tablet; Take 1 tablet by mouth every 4 (four) hours as needed for moderate pain Max Daily Amount: 6 tablets    Cytologic evidence of malignancy on smear of cervix          Subjective:      Patient ID: Evin Bravo is a 72 y o  female  She has h/o pulmonary nodules  She is a smoker  She has not had CT chest in f/u for greater than 2 years  She has no cough or hemoptysis  She has chronic pain related to lumbar radiculopathy  She is taking Vicodin with good results  She has no side effects and good pain relief  She has a signed treatment agreement in the chart  There has been no concern for misuse, abuse, or diversion  She has HTN  Her BP is well controlled on her current regimen  She has no CP or SOB  She has no HA or vision changes  The following portions of the patient's history were reviewed and updated as appropriate:   She  has a past medical history of Elevated liver enzymes and Fracture of rib    She   Patient Active Problem List    Diagnosis Date Noted    Pulmonary nodules 09/11/2018    Cytologic evidence of malignancy on smear of cervix 09/11/2018    Asymptomatic microscopic hematuria 06/11/2018    Rash of face 06/11/2018    Essential hypertension 03/22/2018    Other hyperlipidemia 03/22/2018    Screening for genitourinary condition 03/22/2018    Screening for neurological condition 03/22/2018    Hyperlipidemia 08/31/2015    Hypertension 08/31/2015    Lumbar disc disease 08/18/2014     She  has a past surgical history that includes Appendectomy; Back surgery; Cholecystectomy; Foot surgery; Total hip arthroplasty (Right); and Total abdominal hysterectomy  Her family history includes Diabetes type II in her mother; Heart disease in her father and mother  She  reports that she has been smoking  She has never used smokeless tobacco  She reports that she does not drink alcohol or use drugs  Current Outpatient Prescriptions   Medication Sig Dispense Refill    atenolol (TENORMIN) 50 mg tablet Take 1 tablet (50 mg total) by mouth daily 90 tablet 3    azelastine (ASTELIN) 0 1 % nasal spray 1 spray into each nostril 2 (two) times a day Use in each nostril as directed 30 mL 5    COMBIVENT RESPIMAT inhaler inhale 1 puff by mouth four times a day MAX OF 6 PUFFS IN 24 HOURS  0    diazepam (VALIUM) 5 mg tablet Take 1 tablet (5 mg total) by mouth 3 (three) times a day as needed for anxiety 90 tablet 0    HYDROcodone-acetaminophen (NORCO)  mg per tablet Take 1 tablet by mouth every 4 (four) hours as needed for moderate pain Max Daily Amount: 6 tablets 150 tablet 0    Magnesium 500 MG TABS Take 1 tablet by mouth daily      omeprazole (PRILOSEC) 20 mg delayed release capsule Take 1 capsule (20 mg total) by mouth 2 (two) times a day 180 capsule 3    Tiotropium Bromide-Olodaterol (STIOLTO RESPIMAT) 2 5-2 5 MCG/ACT AERS Inhale 1 puff daily      venlafaxine (EFFEXOR-XR) 75 mg 24 hr capsule Take 3 capsules (225 mg total) by mouth daily 90 capsule 0     No current facility-administered medications for this visit        Current Outpatient Prescriptions on File Prior to Visit   Medication Sig    atenolol (TENORMIN) 50 mg tablet Take 1 tablet (50 mg total) by mouth daily    azelastine (ASTELIN) 0 1 % nasal spray 1 spray into each nostril 2 (two) times a day Use in each nostril as directed    COMBIVENT RESPIMAT inhaler inhale 1 puff by mouth four times a day MAX OF 6 PUFFS IN 24 HOURS    Magnesium 500 MG TABS Take 1 tablet by mouth daily    omeprazole (PRILOSEC) 20 mg delayed release capsule Take 1 capsule (20 mg total) by mouth 2 (two) times a day    Tiotropium Bromide-Olodaterol (STIOLTO RESPIMAT) 2 5-2 5 MCG/ACT AERS Inhale 1 puff daily    venlafaxine (EFFEXOR-XR) 75 mg 24 hr capsule Take 3 capsules (225 mg total) by mouth daily    [DISCONTINUED] diazepam (VALIUM) 5 mg tablet Take 1 tablet (5 mg total) by mouth 3 (three) times a day as needed for anxiety    [DISCONTINUED] HYDROcodone-acetaminophen (NORCO)  mg per tablet Take 1 tablet by mouth every 4 (four) hours as needed for moderate pain Max Daily Amount: 6 tablets    [DISCONTINUED] atorvastatin (LIPITOR) 40 mg tablet Take 1 tablet by mouth daily    [DISCONTINUED] clotrimazole (MYCELEX) 10 mg shante Apply to the mouth or throat 5 (five) times a day    [DISCONTINUED] metoprolol succinate (TOPROL-XL) 50 mg 24 hr tablet Take 0 5 tablets (25 mg total) by mouth daily (Patient not taking: Reported on 9/11/2018 )     No current facility-administered medications on file prior to visit  She has No Known Allergies       Review of Systems   All other systems reviewed and are negative  Objective:      /74 (BP Location: Left arm, Patient Position: Sitting, Cuff Size: Standard)   Pulse 96   Resp 15   Ht 5' 4" (1 626 m)   Wt 69 7 kg (153 lb 9 6 oz)   SpO2 96%   BMI 26 37 kg/m²          Physical Exam   Constitutional: She is oriented to person, place, and time  She appears well-developed and well-nourished  Neck: Normal range of motion  Neck supple  Cardiovascular: Normal rate, regular rhythm, normal heart sounds and intact distal pulses  Pulmonary/Chest: Effort normal and breath sounds normal    Abdominal: Soft  Bowel sounds are normal    Musculoskeletal: Normal range of motion     Neurological: She is alert and oriented to person, place, and time  Skin: Skin is warm and dry  Psychiatric: She has a normal mood and affect  Her behavior is normal  Judgment and thought content normal    Nursing note and vitals reviewed

## 2018-09-11 NOTE — PATIENT INSTRUCTIONS
Chronic Hypertension   AMBULATORY CARE:   Hypertension  is high blood pressure (BP)  Your BP is the force of your blood moving against the walls of your arteries  Normal BP is less than 120/80  Prehypertension is between 120/80 and 139/89  Hypertension is 140/90 or higher  Hypertension causes your BP to get so high that your heart has to work much harder than normal  This can damage your heart  Chronic hypertension is a long-term condition that you can control with a healthy lifestyle or medicines  A controlled blood pressure helps protect your organs, such as your heart, lungs, brain, and kidneys  Common symptoms include the following:   · Headache     · Blurred vision    · Chest pain     · Dizziness or weakness     · Trouble breathing     · Nosebleeds  Call 911 for any of the following:   · You have discomfort in your chest that feels like squeezing, pressure, fullness, or pain  · You become confused or have difficulty speaking  · You suddenly feel lightheaded or have trouble breathing  · You have pain or discomfort in your back, neck, jaw, stomach, or arm  Seek care immediately if:   · You have a severe headache or vision loss  · You have weakness in an arm or leg  Contact your healthcare provider if:   · You feel faint, dizzy, confused, or drowsy  · You have been taking your BP medicine and your BP is still higher than your healthcare provider says it should be  · You have questions or concerns about your condition or care  Treatment for chronic hypertension  may include medicine to lower your BP and lower your cholesterol level  A low cholesterol level helps prevent heart disease and makes it easier to control your blood pressure  Heart disease can make your blood pressure harder to control  You may also need to make lifestyle changes  Take your medicine exactly as directed    Manage chronic hypertension:  Talk with your healthcare provider about these and other ways to manage hypertension:  · Take your BP at home  Sit and rest for 5 minutes before you take your BP  Extend your arm and support it on a flat surface  Your arm should be at the same level as your heart  Follow the directions that came with your BP monitor  If possible, take at least 2 BP readings each time  Take your BP at least twice a day at the same times each day, such as morning and evening  Keep a record of your BP readings and bring it to your follow-up visits  Ask your healthcare provider what your blood pressure should be  · Limit sodium (salt) as directed  Too much sodium can affect your fluid balance  Check labels to find low-sodium or no-salt-added foods  Some low-sodium foods use potassium salts for flavor  Too much potassium can also cause health problems  Your healthcare provider will tell you how much sodium and potassium are safe for you to have in a day  He or she may recommend that you limit sodium to 2,300 mg a day  · Follow the meal plan recommended by your healthcare provider  A dietitian or your provider can give you more information on low-sodium plans or the DASH (Dietary Approaches to Stop Hypertension) eating plan  The DASH plan is low in sodium, unhealthy fats, and total fat  It is high in potassium, calcium, and fiber  · Exercise to maintain a healthy weight  Exercise at least 30 minutes per day, on most days of the week  This will help decrease your blood pressure  Ask about the best exercise plan for you  · Decrease stress  This may help lower your BP  Learn ways to relax, such as deep breathing or listening to music  · Limit alcohol  Women should limit alcohol to 1 drink a day  Men should limit alcohol to 2 drinks a day  A drink of alcohol is 12 ounces of beer, 5 ounces of wine, or 1½ ounces of liquor  · Do not smoke  Nicotine and other chemicals in cigarettes and cigars can increase your BP and also cause lung damage   Ask your healthcare provider for information if you currently smoke and need help to quit  E-cigarettes or smokeless tobacco still contain nicotine  Talk to your healthcare provider before you use these products  Follow up with your healthcare provider as directed: You will need to return to have your BP checked and to have other lab tests done  Write down your questions so you remember to ask them during your visits  © 2017 [employer] Information is for End User's use only and may not be sold, redistributed or otherwise used for commercial purposes  All illustrations and images included in CareNotes® are the copyrighted property of [employer]  or [employer]  The above information is an  only  It is not intended as medical advice for individual conditions or treatments  Talk to your doctor, nurse or pharmacist before following any medical regimen to see if it is safe and effective for you  Cigarette Smoking and Your Health   AMBULATORY CARE:   Risks to your health if you smoke:  Nicotine and other chemicals found in tobacco damage every cell in your body  Even if you are a light smoker, you have an increased risk for cancer, heart disease, and lung disease  If you are pregnant or have diabetes, smoking increases your risk for complications  Benefits to your health if you stop smoking:   · You decrease respiratory symptoms such as coughing, wheezing, and shortness of breath  · You reduce your risk for cancers of the lung, mouth, throat, kidney, bladder, pancreas, stomach, and cervix  If you already have cancer, you increase the benefits of chemotherapy  You also reduce your risk for cancer returning or a second cancer from developing  · You reduce your risk for heart disease, blood clots, heart attack, and stroke  · You reduce your risk for lung infections, and diseases such as pneumonia, asthma, chronic bronchitis, and emphysema  · Your circulation improves   More oxygen can be delivered to your body  If you have diabetes, you lower your risk for complications, such as kidney, artery, and eye diseases  You also lower your risk for nerve damage  Nerve damage can lead to amputations, poor vision, and blindness  · You improve your body's ability to heal and to fight infections  Benefits to the health of others if you stop smoking:  Tobacco is harmful to nonsmokers who breathe in your secondhand smoke  The following are ways the health of others around you may improve when you stop smoking:  · You lower the risks for lung cancer and heart disease in nonsmoking adults  · If you are pregnant, you lower the risk for miscarriage, early delivery, low birth weight, and stillbirth  You also lower your baby's risk for SIDS, obesity, developmental delay, and neurobehavioral problems, such as ADHD  · If you have children, you lower their risk for ear infections, colds, pneumonia, bronchitis, and asthma  For more information and support to stop smoking:   · [facility]  Phone: 2- 931 - 771-8781  Web Address: www [employer]  Follow up with your healthcare provider as directed:  Write down your questions so you remember to ask them during your visits  © 2017 [employer] Information is for End User's use only and may not be sold, redistributed or otherwise used for commercial purposes  All illustrations and images included in CareNotes® are the copyrighted property of [employer]  or [employer]  The above information is an  only  It is not intended as medical advice for individual conditions or treatments  Talk to your doctor, nurse or pharmacist before following any medical regimen to see if it is safe and effective for you

## 2018-10-16 ENCOUNTER — OFFICE VISIT (OUTPATIENT)
Dept: FAMILY MEDICINE CLINIC | Facility: CLINIC | Age: 73
End: 2018-10-16
Payer: COMMERCIAL

## 2018-10-16 VITALS
OXYGEN SATURATION: 95 % | HEIGHT: 64 IN | RESPIRATION RATE: 14 BRPM | BODY MASS INDEX: 25.95 KG/M2 | DIASTOLIC BLOOD PRESSURE: 78 MMHG | SYSTOLIC BLOOD PRESSURE: 124 MMHG | WEIGHT: 152 LBS | HEART RATE: 74 BPM

## 2018-10-16 DIAGNOSIS — E04.1 THYROID NODULE: ICD-10-CM

## 2018-10-16 DIAGNOSIS — I10 ESSENTIAL HYPERTENSION: Primary | ICD-10-CM

## 2018-10-16 DIAGNOSIS — R52 PAIN: ICD-10-CM

## 2018-10-16 DIAGNOSIS — G89.29 CHRONIC PAIN OF RIGHT KNEE: ICD-10-CM

## 2018-10-16 DIAGNOSIS — M25.561 CHRONIC PAIN OF RIGHT KNEE: ICD-10-CM

## 2018-10-16 DIAGNOSIS — E78.49 OTHER HYPERLIPIDEMIA: ICD-10-CM

## 2018-10-16 DIAGNOSIS — I10 HTN, GOAL BELOW 140/90: ICD-10-CM

## 2018-10-16 PROBLEM — J44.1 COPD WITH ACUTE EXACERBATION (HCC): Status: ACTIVE | Noted: 2017-12-13

## 2018-10-16 PROCEDURE — 99214 OFFICE O/P EST MOD 30 MIN: CPT | Performed by: FAMILY MEDICINE

## 2018-10-16 RX ORDER — HYDROCODONE BITARTRATE AND ACETAMINOPHEN 10; 325 MG/1; MG/1
1 TABLET ORAL EVERY 4 HOURS PRN
Qty: 150 TABLET | Refills: 0 | Status: SHIPPED | OUTPATIENT
Start: 2018-10-16 | End: 2018-11-23 | Stop reason: SDUPTHER

## 2018-10-16 RX ORDER — POLYETHYLENE GLYCOL-3350 AND ELECTROLYTES WITH FLAVOR PACK 240; 5.84; 2.98; 6.72; 22.72 G/278.26G; G/278.26G; G/278.26G; G/278.26G; G/278.26G
POWDER, FOR SOLUTION ORAL
Refills: 0 | COMMUNITY
Start: 2018-07-23 | End: 2018-11-14 | Stop reason: ALTCHOICE

## 2018-10-16 NOTE — PATIENT INSTRUCTIONS

## 2018-10-16 NOTE — PROGRESS NOTES
Assessment/Plan:    No problem-specific Assessment & Plan notes found for this encounter  Diagnoses and all orders for this visit:    Essential hypertension    Other hyperlipidemia    Thyroid nodule  -     US thyroid; Future    HTN, goal below 140/90    Chronic pain of right knee  -     XR hip/pelv 2-3 vws right if performed; Future  -     XR knee 4+ vw right injury; Future  -     Ambulatory referral to Orthopedic Surgery; Future    Pain  -     HYDROcodone-acetaminophen (NORCO)  mg per tablet; Take 1 tablet by mouth every 4 (four) hours as needed for moderate pain Max Daily Amount: 6 tablets    Other orders  -     GAVILYTE-C 240 g solution; USE AS DIRECTED TO PREP FOR COLONOSCOPY          Subjective:      Patient ID: Merlene Carrillo is a 72 y o  female  She is s/p right total hip replacement 4 years ago  She is now having right knee pain  The pain is behind the knee cap  It does not hurt at rest   It hurts most when she gets up to walk  Once she is mobile, it gets better  The knee is stiff  She is taking OTC NSAIDs with some relief  She has no trauma  It is not red or warm  It does not lock or click  She has COPD seen on CT scan  She is using her inhalers  She has no CP or SOB  Shehas no cough or sputum production  She continues to smoke and is not interested in quitting  The following portions of the patient's history were reviewed and updated as appropriate:   She  has a past medical history of Elevated liver enzymes and Fracture of rib    She   Patient Active Problem List    Diagnosis Date Noted    Thyroid nodule 10/16/2018    Chronic pain of right knee 10/16/2018    Pulmonary nodules 09/11/2018    Cytologic evidence of malignancy on smear of cervix 09/11/2018    Asymptomatic microscopic hematuria 06/11/2018    Rash of face 06/11/2018    Essential hypertension 03/22/2018    Other hyperlipidemia 03/22/2018    Screening for genitourinary condition 03/22/2018    Screening for neurological condition 03/22/2018    COPD with [facility] exacerbation ([facility] [address] ) 12/13/2017    Hyperlipidemia 08/31/2015    Hypertension 08/31/2015    [facility] 08[facility]    Lumbar disc disease 08/18/2014    HTN, goal below 140/90 08/24/2011     She  has a past surgical history that includes Appendectomy; Back surgery; Cholecystectomy; Foot surgery; Total hip arthroplasty (Right); and Total abdominal hysterectomy  Her family history includes Diabetes type II in her mother; Heart disease in her father and mother  She  reports that she has been smoking  She has never used smokeless tobacco  She reports that she does not drink alcohol or use drugs  Current Outpatient Prescriptions   Medication Sig Dispense Refill    atenolol (TENORMIN) 50 mg tablet Take 1 tablet (50 mg total) by mouth daily 90 tablet 3    azelastine (ASTELIN) 0 1 % nasal spray 1 spray into each nostril 2 (two) times a day Use in each nostril as directed 30 mL 5    COMBIVENT RESPIMAT inhaler inhale 1 puff by mouth four times a day MAX OF 6 PUFFS IN 24 HOURS  0    diazepam (VALIUM) 5 mg tablet Take 1 tablet (5 mg total) by mouth 3 (three) times a day as needed for anxiety 90 tablet 0    GAVILYTE-C 240 g solution USE AS DIRECTED TO PREP FOR COLONOSCOPY  0    HYDROcodone-acetaminophen (NORCO)  mg per tablet Take 1 tablet by mouth every 4 (four) hours as needed for moderate pain Max Daily Amount: 6 tablets 150 tablet 0    Magnesium 500 MG TABS Take 1 tablet by mouth daily      omeprazole (PRILOSEC) 20 mg delayed release capsule Take 1 capsule (20 mg total) by mouth 2 (two) times a day 180 capsule 3    Tiotropium Bromide-Olodaterol (STIOLTO RESPIMAT) 2 5-2 5 MCG/ACT AERS Inhale 1 puff daily      venlafaxine (EFFEXOR-XR) 75 mg 24 hr capsule Take 3 capsules (225 mg total) by mouth daily 90 capsule 0     No current facility-administered medications for this visit        Current Outpatient Prescriptions on File Prior to Visit   Medication Sig    atenolol (TENORMIN) 50 mg tablet Take 1 tablet (50 mg total) by mouth daily    azelastine (ASTELIN) 0 1 % nasal spray 1 spray into each nostril 2 (two) times a day Use in each nostril as directed    COMBIVENT RESPIMAT inhaler inhale 1 puff by mouth four times a day MAX OF 6 PUFFS IN 24 HOURS    diazepam (VALIUM) 5 mg tablet Take 1 tablet (5 mg total) by mouth 3 (three) times a day as needed for anxiety    Magnesium 500 MG TABS Take 1 tablet by mouth daily    omeprazole (PRILOSEC) 20 mg delayed release capsule Take 1 capsule (20 mg total) by mouth 2 (two) times a day    Tiotropium Bromide-Olodaterol (STIOLTO RESPIMAT) 2 5-2 5 MCG/ACT AERS Inhale 1 puff daily    venlafaxine (EFFEXOR-XR) 75 mg 24 hr capsule Take 3 capsules (225 mg total) by mouth daily    [DISCONTINUED] HYDROcodone-acetaminophen (NORCO)  mg per tablet Take 1 tablet by mouth every 4 (four) hours as needed for moderate pain Max Daily Amount: 6 tablets     No current facility-administered medications on file prior to visit  She has No Known Allergies       Review of Systems   All other systems reviewed and are negative  Objective:      /78 (BP Location: Left arm, Patient Position: Sitting, Cuff Size: Standard)   Pulse 74   Resp 14   Ht 5' 4" (1 626 m)   Wt 68 9 kg (152 lb)   SpO2 95%   BMI 26 09 kg/m²          Physical Exam   Constitutional: She is oriented to person, place, and time  She appears well-developed and well-nourished  Neck: Normal range of motion  Neck supple  Cardiovascular: Normal rate, regular rhythm, normal heart sounds and intact distal pulses  Pulmonary/Chest: Effort normal and breath sounds normal    Abdominal: Soft  Bowel sounds are normal    Musculoskeletal: Normal range of motion  Neurological: She is alert and oriented to person, place, and time  She has normal reflexes  Skin: Skin is warm and dry     Psychiatric: She has a normal mood and affect  Her behavior is normal  Judgment and thought content normal    Nursing note and vitals reviewed

## 2018-10-24 ENCOUNTER — APPOINTMENT (OUTPATIENT)
Dept: RADIOLOGY | Facility: MEDICAL CENTER | Age: 73
End: 2018-10-24
Payer: COMMERCIAL

## 2018-10-24 ENCOUNTER — OFFICE VISIT (OUTPATIENT)
Dept: OBGYN CLINIC | Facility: CLINIC | Age: 73
End: 2018-10-24
Payer: COMMERCIAL

## 2018-10-24 VITALS
WEIGHT: 151 LBS | BODY MASS INDEX: 25.78 KG/M2 | SYSTOLIC BLOOD PRESSURE: 150 MMHG | HEART RATE: 76 BPM | HEIGHT: 64 IN | DIASTOLIC BLOOD PRESSURE: 83 MMHG

## 2018-10-24 DIAGNOSIS — F41.9 ANXIETY: ICD-10-CM

## 2018-10-24 DIAGNOSIS — M25.551 PAIN IN RIGHT HIP: ICD-10-CM

## 2018-10-24 DIAGNOSIS — G89.29 CHRONIC PAIN OF RIGHT KNEE: ICD-10-CM

## 2018-10-24 DIAGNOSIS — M25.561 CHRONIC PAIN OF RIGHT KNEE: ICD-10-CM

## 2018-10-24 DIAGNOSIS — G89.29 CHRONIC PAIN OF RIGHT KNEE: Primary | ICD-10-CM

## 2018-10-24 DIAGNOSIS — M25.561 CHRONIC PAIN OF RIGHT KNEE: Primary | ICD-10-CM

## 2018-10-24 DIAGNOSIS — M17.11 ARTHRITIS OF RIGHT KNEE: ICD-10-CM

## 2018-10-24 PROCEDURE — 73562 X-RAY EXAM OF KNEE 3: CPT

## 2018-10-24 PROCEDURE — 99203 OFFICE O/P NEW LOW 30 MIN: CPT | Performed by: PHYSICIAN ASSISTANT

## 2018-10-24 PROCEDURE — 73502 X-RAY EXAM HIP UNI 2-3 VIEWS: CPT

## 2018-10-24 PROCEDURE — 20610 DRAIN/INJ JOINT/BURSA W/O US: CPT | Performed by: PHYSICIAN ASSISTANT

## 2018-10-24 RX ORDER — BETAMETHASONE SODIUM PHOSPHATE AND BETAMETHASONE ACETATE 3; 3 MG/ML; MG/ML
6 INJECTION, SUSPENSION INTRA-ARTICULAR; INTRALESIONAL; INTRAMUSCULAR; SOFT TISSUE
Status: COMPLETED | OUTPATIENT
Start: 2018-10-24 | End: 2018-10-24

## 2018-10-24 RX ORDER — DIAZEPAM 5 MG/1
5 TABLET ORAL 3 TIMES DAILY PRN
Qty: 90 TABLET | Refills: 0 | Status: SHIPPED | OUTPATIENT
Start: 2018-10-24 | End: 2018-11-30 | Stop reason: SDUPTHER

## 2018-10-24 RX ORDER — LIDOCAINE HYDROCHLORIDE 10 MG/ML
2 INJECTION, SOLUTION INFILTRATION; PERINEURAL
Status: COMPLETED | OUTPATIENT
Start: 2018-10-24 | End: 2018-10-24

## 2018-10-24 RX ADMIN — LIDOCAINE HYDROCHLORIDE 2 ML: 10 INJECTION, SOLUTION INFILTRATION; PERINEURAL at 11:20

## 2018-10-24 RX ADMIN — BETAMETHASONE SODIUM PHOSPHATE AND BETAMETHASONE ACETATE 6 MG: 3; 3 INJECTION, SUSPENSION INTRA-ARTICULAR; INTRALESIONAL; INTRAMUSCULAR; SOFT TISSUE at 11:20

## 2018-10-24 NOTE — PROGRESS NOTES
73 y o female presents for  Evaluation of right knee pain  She she also has some right hip pain  She had right hip surgery in the past at [facility] approximately 4 years ago by a surgeon who is now left the practice  She has no recent xrays  She was seen by [phone] said her hip prosthesis is loose the other set it is from her back  She is now complaining of knee pain as well  She gets discrete anterior groin pain and has difficulty flexing her hip  She denies fevers chills or sweats  She denies redness about her hip incision  She does have chronic back pain  He has some lingering lateral right shin paresthesia from back surgery  She denies any recent injury trauma or fall  Review of systems positive for shortness of breath and joint pain      Review of Systems  Review of systems negative unless otherwise specified in HPI    Past Medical History  Past Medical History:   Diagnosis Date    Elevated liver enzymes     last assessed: 12/27/2016    Fracture of rib     last assessed: 08/31/2015     Past Surgical History  Past Surgical History:   Procedure Laterality Date    APPENDECTOMY      last assessed: 08/31/2015    BACK SURGERY      last assessed: 08/31/2015    CHOLECYSTECTOMY      last assessed: 08/31/2015    FOOT SURGERY      last assessed: 08/31/2015    TOTAL ABDOMINAL HYSTERECTOMY      last assessed: 08/31/2015; unilateral oopherectomy    TOTAL HIP ARTHROPLASTY Right     last assessed: 08/31/2015; [facility]     Current Medications  Current Outpatient Prescriptions on File Prior to Visit   Medication Sig Dispense Refill    atenolol (TENORMIN) 50 mg tablet Take 1 tablet (50 mg total) by mouth daily 90 tablet 3    azelastine (ASTELIN) 0 1 % nasal spray 1 spray into each nostril 2 (two) times a day Use in each nostril as directed 30 mL 5    COMBIVENT RESPIMAT inhaler inhale 1 puff by mouth four times a day MAX OF 6 PUFFS IN 24 HOURS  0    GAVILYTE-C 240 g solution USE AS DIRECTED TO PREP FOR COLONOSCOPY  0    HYDROcodone-acetaminophen (NORCO)  mg per tablet Take 1 tablet by mouth every 4 (four) hours as needed for moderate pain Max Daily Amount: 6 tablets 150 tablet 0    Magnesium 500 MG TABS Take 1 tablet by mouth daily      omeprazole (PRILOSEC) 20 mg delayed release capsule Take 1 capsule (20 mg total) by mouth 2 (two) times a day 180 capsule 3    Tiotropium Bromide-Olodaterol (STIOLTO RESPIMAT) 2 5-2 5 MCG/ACT AERS Inhale 1 puff daily      venlafaxine (EFFEXOR-XR) 75 mg 24 hr capsule Take 3 capsules (225 mg total) by mouth daily 90 capsule 0    [DISCONTINUED] diazepam (VALIUM) 5 mg tablet Take 1 tablet (5 mg total) by mouth 3 (three) times a day as needed for anxiety 90 tablet 0     No current facility-administered medications on file prior to visit  Recent Labs (HCT,HGB,PT,INR,ESR,CRP,GLU,HgA1C)  No results found for: HCT, HGB, WBC, PT, INR, ESR, CRP, GLUCOSE, HGBA1C    Physical exam  · General: Awake, Alert, Oriented , thin, younger than stated age  · Eyes: Pupils equal, round and reactive to light  · Heart: regular rate and rhythm  · Lungs: No audible wheezing  · Abdomen: soft  right Knee exam  ·  scant effusion but patella is non ballotable  Slight popliteal fullness  Pain over the medial joint and posterior medial aspect  There is also pain with the patellar facets  Range of motion is 2-125 degrees  She is grossly neurovascular intact  Strength the quads and hamstrings 4/5  No gross ligamentous laxity with valgus stressing or anterior posterior drawer or Lachman's testing  She has a negative Jessica's  There is patellofemoral crepitation  Patellar and  Achilles reflexes are 2/4 symmetric bilateral    There is atrophy of the  right quadriceps  Gross sensation is intact throughout the bilateral extremities except the lateral distal shin  ·   Patient has pain with Stinchfield testing the right hip  She has also weakness with hip flexion while she supine    She has good plantar and dorsiflexion strength  She has negative hip or groin pain with log rolling  Imaging    I personally reviewed x-rays done in the office today right knee and right hip which notes right hip prosthesis a slightly transverse open position appears well fixed in the acetabulum  Question of distal stem line suggesting possible looseness  Right knee notes arthritic change patellofemoral and femoral tibial     1  Chronic pain of right knee    2  Pain in right hip    3  Arthritis of right knee      Assessment:  right  Knee arthritis with chronic right hip pain status post total hip arthroplasty approximately 4 years ago  Large joint arthrocentesis  Date/Time: 10/24/2018 11:20 AM  Consent given by: patient  Timeout: Immediately prior to procedure a time out was called to verify the correct patient, procedure, equipment, support staff and site/side marked as required   Supporting Documentation  Indications: pain   Procedure Details  Location: knee - R knee  Preparation: Patient was prepped and draped in the usual sterile fashion  Needle size: 22 G  Ultrasound guidance: no  Approach: anterolateral  Medications administered: 6 mg betamethasone acetate-betamethasone sodium phosphate 6 (3-3) mg/mL; 2 mL lidocaine 1 %    Patient tolerance: patient tolerated the procedure well with no immediate complications  Dressing:  Sterile dressing applied    Plan:   Patient is being evaluated for right knee today only due to the fact that we have no old records of her right hip  Patient will sign medical release and then obtain records from [facility] for her hip as already discussed with the patient that would likely refer her back to a joint specialist probably wenceslao Abdi for follow-up of right hip  We will start her in physical therapy for the strengthening of the right knee and right hip with care not to aggravate the right hip symptoms    After cortisone injection today she can ice her right knee 20 minutes 1-2 times today then as needed  Recheck in this office in 6 weeks  Weightbearing as tolerated  We will attempt to get home care for this patient

## 2018-10-24 NOTE — PATIENT INSTRUCTIONS
shanell is being evaluated for right knee today only due to the fact that we have no old records of her right hip  Patient will sign medical release and then obtain records from [facility] for her hip as already discussed with the patient that would likely refer her back to a joint specialist probably wenceslao Abdi for follow-up of right hip  We will start her in physical therapy for the strengthening of the right knee and right hip with care not to aggravate the right hip symptoms  After cortisone injection today she can ice her right knee 20 minutes 1-2 times today then as needed  Recheck in this office in 6 weeks  Weightbearing as tolerated  We will attempt to get home care for this patient

## 2018-11-07 DIAGNOSIS — E04.1 THYROID NODULE: ICD-10-CM

## 2018-11-14 ENCOUNTER — OFFICE VISIT (OUTPATIENT)
Dept: FAMILY MEDICINE CLINIC | Facility: CLINIC | Age: 73
End: 2018-11-14
Payer: COMMERCIAL

## 2018-11-14 VITALS
OXYGEN SATURATION: 95 % | SYSTOLIC BLOOD PRESSURE: 134 MMHG | TEMPERATURE: 98.3 F | WEIGHT: 151.4 LBS | HEART RATE: 65 BPM | HEIGHT: 64 IN | BODY MASS INDEX: 25.85 KG/M2 | DIASTOLIC BLOOD PRESSURE: 84 MMHG | RESPIRATION RATE: 18 BRPM

## 2018-11-14 DIAGNOSIS — Z11.59 NEED FOR HEPATITIS C SCREENING TEST: ICD-10-CM

## 2018-11-14 DIAGNOSIS — E53.8 VITAMIN B12 DEFICIENCY: ICD-10-CM

## 2018-11-14 DIAGNOSIS — E78.49 OTHER HYPERLIPIDEMIA: ICD-10-CM

## 2018-11-14 DIAGNOSIS — M81.0 OSTEOPOROSIS, UNSPECIFIED OSTEOPOROSIS TYPE, UNSPECIFIED PATHOLOGICAL FRACTURE PRESENCE: ICD-10-CM

## 2018-11-14 DIAGNOSIS — E55.9 VITAMIN D DEFICIENCY: ICD-10-CM

## 2018-11-14 DIAGNOSIS — R73.9 HYPERGLYCEMIA: ICD-10-CM

## 2018-11-14 DIAGNOSIS — Z12.72 SCREENING FOR VAGINAL CANCER: ICD-10-CM

## 2018-11-14 DIAGNOSIS — Z12.11 COLON CANCER SCREENING: ICD-10-CM

## 2018-11-14 DIAGNOSIS — Z01.419 ENCOUNTER FOR GYNECOLOGICAL EXAMINATION WITHOUT ABNORMAL FINDING: Primary | ICD-10-CM

## 2018-11-14 DIAGNOSIS — M51.9 LUMBAR DISC DISEASE: ICD-10-CM

## 2018-11-14 DIAGNOSIS — I10 ESSENTIAL HYPERTENSION: ICD-10-CM

## 2018-11-14 DIAGNOSIS — Z12.39 ENCOUNTER FOR SCREENING BREAST EXAMINATION: ICD-10-CM

## 2018-11-14 DIAGNOSIS — E04.1 THYROID NODULE: ICD-10-CM

## 2018-11-14 DIAGNOSIS — Z13.0 SCREENING FOR DEFICIENCY ANEMIA: ICD-10-CM

## 2018-11-14 PROCEDURE — G0008 ADMIN INFLUENZA VIRUS VAC: HCPCS

## 2018-11-14 PROCEDURE — G0145 SCR C/V CYTO,THINLAYER,RESCR: HCPCS | Performed by: PATHOLOGY

## 2018-11-14 PROCEDURE — G0124 SCREEN C/V THIN LAYER BY MD: HCPCS | Performed by: PATHOLOGY

## 2018-11-14 PROCEDURE — 3075F SYST BP GE 130 - 139MM HG: CPT | Performed by: NURSE PRACTITIONER

## 2018-11-14 PROCEDURE — 87624 HPV HI-RISK TYP POOLED RSLT: CPT | Performed by: NURSE PRACTITIONER

## 2018-11-14 PROCEDURE — 90662 IIV NO PRSV INCREASED AG IM: CPT

## 2018-11-14 PROCEDURE — 3079F DIAST BP 80-89 MM HG: CPT | Performed by: NURSE PRACTITIONER

## 2018-11-14 PROCEDURE — 99214 OFFICE O/P EST MOD 30 MIN: CPT | Performed by: NURSE PRACTITIONER

## 2018-11-14 NOTE — PATIENT INSTRUCTIONS
Place annual gynecologic exam patient instructions here  Breast Self Exam for Women   WHAT YOU NEED TO KNOW:   What is a breast self-exam (BSE)? A BSE is a way to check your breasts for lumps and other changes  Regular BSEs can help you know how your breasts normally look and feel  Most breast lumps or changes are not cancer, but you should always have them checked by a healthcare provider  Your healthcare provider can also watch you do a BSE and can tell you if you are doing your BSE correctly  Why should I do a BSE? Breast cancer is the most common type of cancer in women  Even if you have mammograms, you may still want to do a BSE regularly  If you know how your breasts normally feel and look, it may help you know when to contact your healthcare provider  Mammograms can miss some cancers  You may find a lump during a BSE that did not show up on your mammogram   When should I do a BSE? Petr your calendar to help you remember to do BSE on a regular schedule  One easy way to remember to do a BSE is to do the exam on the same day of each month  If you have periods, you may want to do your BSE 1 week after your period ends  This is the time when your breasts may be the least swollen, lumpy, or tender  You can do regular BSEs even if you are breastfeeding or have breast implants  How should I do a BSE? · Look at your breasts in a mirror  Look at the size and shape of each breast and nipple  Check for swelling, lumps, dimpling, scaly skin, or other skin changes  Look for nipple changes, such as a nipple that is painful or beginning to pull inward  Gently squeeze both nipples and check to see if fluid (that is not breast milk) comes out of them  If you find any of these or other breast changes, contact your healthcare provider  Check your breasts while you sit or  the following 3 positions:    [facility] your arms down at your sides  ¨ Raise your hands and join them behind your head       ¨ Put firm pressure with your hands on your hips  Bend slightly forward while you look at your breasts in the mirror  · Lie down and feel your breasts  When you lie down, your breast tissue spreads out evenly over your chest  This makes it easier for you to feel for lumps and anything that may not be normal for your breasts  Do a BSE on one breast at a time  ¨ Place a small pillow or towel under your left shoulder  Put your left arm behind your head  ¨ Use the 3 middle fingers of your right hand  Use your fingertip pads, on the top of your fingers  Your fingertip pad is the most sensitive part of your finger  ¨ Use small circles to feel your breast tissue  Use your fingertip pads to make dime-sized, overlapping circles on your breast and armpits  Use light, medium, and firm pressure  First, press lightly  Second, press with medium pressure to feel a little deeper into the breast  Last, use firm pressure to feel deep within your breast     ¨ Examine your entire breast area  Examine the breast area from above the breast to below the breast where you feel only ribs  Make small circles with your fingertips, starting in the middle of your armpit  Make circles going up and down the breast area  Continue toward your breast and all the way across it  Examine the area from your armpit all the way over to the middle of your chest (breastbone)  Stop at the middle of your chest     ¨ Move the pillow or towel to your right shoulder, and put your right arm behind your head  Use the 3 fingertip pads of your left hand, and repeat the above steps to do a BSE on your right breast        What else can I do to check for breast problems or cancer? Some experts suggest that women 36years of age or older should have a mammogram every year  Other experts suggest that women between the ages of 48and 76years old should have a mammogram every 2 years   Talk to your healthcare provider about when you should have a mammogram   When should I contact my healthcare provider? · You find any lumps or changes in your breasts  · You have breast pain or fluid coming from your nipples  · You have questions or concerns or concerns about your condition or care  CARE AGREEMENT:   You have the right to help plan your care  Learn about your health condition and how it may be treated  Discuss treatment options with your caregivers to decide what care you want to receive  You always have the right to refuse treatment  The above information is an  only  It is not intended as medical advice for individual conditions or treatments  Talk to your doctor, nurse or pharmacist before following any medical regimen to see if it is safe and effective for you  © 2017 [address] Information is for End User's use only and may not be sold, redistributed or otherwise used for commercial purposes  All illustrations and images included in CareNotes® are the copyrighted property of [employer]  or Jay Hardy  Wellness Visit for Adults   WHAT YOU NEED TO KNOW:   What is a wellness visit? A wellness visit is when you see your healthcare provider to get screened for health problems  You can also get advice on how to stay healthy  Write down your questions so you remember to ask them  Ask your healthcare provider how often you should have a wellness visit  What happens at a wellness visit? Your healthcare provider will ask about your health, and your family history of health problems  This includes high blood pressure, heart disease, and cancer  He or she will ask if you have symptoms that concern you, if you smoke, and about your mood  You may also be asked about your intake of medicines, supplements, food, and alcohol  Any of the following may be done:  · Your weight  will be checked  Your height may also be checked so your body mass index (BMI) can be calculated   Your BMI shows if you are at a healthy weight  · Your blood pressure  and heart rate will be checked  Your temperature may also be checked  · Blood and urine tests  may be done  Blood tests may be done to check your cholesterol levels  Abnormal cholesterol levels increase your risk for heart disease and stroke  You may also need a blood or urine test to check for diabetes if you are at increased risk  Urine tests may be done to look for signs of an infection or kidney disease  · A physical exam  includes checking your heartbeat and lungs with a stethoscope  Your healthcare provider may also check your skin to look for sun damage  · Screening tests  may be recommended  A screening test is done to check for diseases that may not cause symptoms  The screening tests you may need depend on your age, gender, family history, and lifestyle habits  For example, colorectal screening may be recommended if you are 48years old or older  What screening tests do I need if I am a woman? · A Pap smear  is used to screen for cervical cancer  Pap smears are usually done every 3 to 5 years depending on your age  You may need them more often if you have had abnormal Pap smear test results in the past  Ask your healthcare provider how often you should have a Pap smear  · A mammogram  is an x-ray of your breasts to screen for breast cancer  Experts recommend mammograms every 2 years starting at age 48 years  You may need a mammogram at age 52 years or younger if you have an increased risk for breast cancer  Talk to your healthcare provider about when you should start having mammograms and how often you need them  What vaccines might I need? · Get an influenza vaccine  every year  The influenza vaccine protects you from the flu  Several types of viruses cause the flu  The viruses change over time, so new vaccines are made each year  · Get a tetanus-diphtheria (Td) booster vaccine  every 10 years   This vaccine protects you against tetanus and diphtheria  Tetanus is a severe infection that may cause painful muscle spasms and lockjaw  Diphtheria is a severe bacterial infection that causes a thick covering in the back of your mouth and throat  · Get a human papillomavirus (HPV) vaccine  if you are female and aged 23 to 32 or male 23 to 24 and never received it  This vaccine protects you from HPV infection  HPV is the most common infection spread by sexual contact  HPV may also cause vaginal, penile, and anal cancers  · Get a pneumococcal vaccine  if you are aged 72 years or older  The pneumococcal vaccine is an injection given to protect you from pneumococcal disease  Pneumococcal disease is an infection caused by pneumococcal bacteria  The infection may cause pneumonia, meningitis, or an ear infection  · Get a shingles vaccine  if you are aged 61 or older, even if you have had shingles before  The shingles vaccine is an injection to protect you from the varicella-zoster virus  This is the same virus that causes chickenpox  Shingles is a painful rash that develops in people who had chickenpox or have been exposed to the virus  How can I eat healthy? My Plate is a model for planning healthy meals  It shows the types and amounts of foods that should go on your plate  Fruits and vegetables make up about half of your plate, and grains and protein make up the other half  A serving of dairy is included on the side of your plate  The amount of calories and serving sizes you need depends on your age, gender, weight, and height  Examples of healthy foods are listed below:  · Eat a variety of vegetables  such as dark green, red, and orange vegetables  You can also include canned vegetables low in sodium (salt) and frozen vegetables without added butter or sauces  · Eat a variety of fresh fruits , canned fruit in 100% juice, frozen fruit, and dried fruit  · Include whole grains  At least half of the grains you eat should be whole grains   Examples include whole-wheat bread, wheat pasta, brown rice, and whole-grain cereals such as oatmeal     · Eat a variety of protein foods such as seafood (fish and shellfish), lean meat, and poultry without skin (turkey and chicken)  Examples of lean meats include pork leg, shoulder, or tenderloin, and beef round, sirloin, tenderloin, and extra lean ground beef  Other protein foods include eggs and egg substitutes, beans, peas, soy products, nuts, and seeds  · Choose low-fat dairy products such as skim or 1% milk or low-fat yogurt, cheese, and cottage cheese  · Limit unhealthy fats  such as butter, hard margarine, and shortening  How much exercise do I need? Exercise at least 30 minutes per day on most days of the week  Some examples of exercise include walking, biking, dancing, and swimming  You can also fit in more physical activity by taking the stairs instead of the elevator or parking farther away from stores  Include muscle strengthening activities 2 days each week  Regular exercise provides many health benefits  It helps you manage your weight, and decreases your risk for type 2 diabetes, heart disease, stroke, and high blood pressure  Exercise can also help improve your mood  Ask your healthcare provider about the best exercise plan for you  What are some general health and safety guidelines I should follow? · Do not smoke  Nicotine and other chemicals in cigarettes and cigars can cause lung damage  Ask your healthcare provider for information if you currently smoke and need help to quit  E-cigarettes or smokeless tobacco still contain nicotine  Talk to your healthcare provider before you use these products  · Limit alcohol  A drink of alcohol is 12 ounces of beer, 5 ounces of wine, or 1½ ounces of liquor  · Lose weight, if needed  Being overweight increases your risk of certain health conditions   These include heart disease, high blood pressure, type 2 diabetes, and certain types of cancer  · Protect your skin  Do not sunbathe or use tanning beds  Use sunscreen with a SPF 15 or higher  Apply sunscreen at least 15 minutes before you go outside  Reapply sunscreen every 2 hours  Wear protective clothing, hats, and sunglasses when you are outside  · Drive safely  Always wear your seatbelt  Make sure everyone in your car wears a seatbelt  A seatbelt can save your life if you are in an accident  Do not use your cell phone when you are driving  This could distract you and cause an accident  Pull over if you need to make a call or send a text message  · Practice safe sex  Use latex condoms if are sexually active and have more than one partner  Your healthcare provider may recommend screening tests for sexually transmitted infections (STIs)  · Wear helmets, lifejackets, and protective gear  Always wear a helmet when you ride a bike or motorcycle, go skiing, or play sports that could cause a head injury  Wear protective equipment when you play sports  Wear a lifejacket when you are on a boat or doing water sports  CARE AGREEMENT:   You have the right to help plan your care  Learn about your health condition and how it may be treated  Discuss treatment options with your caregivers to decide what care you want to receive  You always have the right to refuse treatment  The above information is an  only  It is not intended as medical advice for individual conditions or treatments  Talk to your doctor, nurse or pharmacist before following any medical regimen to see if it is safe and effective for you  © 2017 [address]  Information is for End User's use only and may not be sold, redistributed or otherwise used for commercial purposes  All illustrations and images included in CareNotes® are the copyrighted property of [employer] Inc  or Jay Hardy

## 2018-11-14 NOTE — PROGRESS NOTES
Assessment/Plan:      Diagnoses and all orders for this visit:    Encounter for gynecological examination without abnormal finding    Osteoporosis, unspecified osteoporosis type, unspecified pathological fracture presence  -     Calcium Carbonate-Vitamin D3 (CALCIUM 600/VITAMIN D) 600-400 MG-UNIT TABS; Take 1 tablet by mouth 2 (two) times a day  -     DXA bone density spine hip and pelvis; Future    Need for hepatitis C screening test  -     Hepatitis C antibody; Future    Vitamin B12 deficiency  -     Vitamin B12; Future    Vitamin D deficiency  -     Vitamin D 25 hydroxy; Future    Other hyperlipidemia  -     Lipid panel; Future    Hyperglycemia  -     Comprehensive metabolic panel; Future  -     Hemoglobin A1C; Future    Thyroid nodule  -     TSH, 3rd generation; Future    Screening for deficiency anemia  -     CBC and differential; Future    Essential hypertension    Lumbar disc disease    Encounter for screening breast examination    Colon cancer screening    Screening for vaginal cancer  -     Liquid-based pap, screening          Subjective:     Patient ID: Jerzy Crespo is a 72 y o  female  Patient presents to office for Annual Gynecological Exam   Complete medical history and medications reviewed with patient and tolerating all medications well without any problems  Patient's last Annual Gynecological Exam was 3 years ago at [facility]  Does have a Hx of Total Abdominal Hysterectomy  Last Mammogram was 5/3/18 and was WNL  Last Dexa Scan was over 2 years ago and does have a Hx Osteoporosis which she was prescribed Fosamax for but she had stopped taking it  Denies any problems with urination or with bowel movements  Denies any vaginal discharge, pelvic pain, or ulcerations  Denies any other problems or concerns at the present time  Patient is currently being followed yearly for follow up US of Thyroid and CT Scan of Chest to follow up thyroid nodules and pulmonary nodules         Review of Systems GENERAL:  Feels well, denies any significant changes in weight without trying  SKIN:  Denies rashes, lesions, opened areas, wounds, change in moles or any other skin changes  HEENT:  Denies any head injury or headaches  Negative blurred vision, floaters, spots before eyes, infections, or other vision problems  Negative significant changes in vision or hearing  Negative tinnitus, vertigo, or infections  Negative hay fever, sinus trouble, nasal discharge, bloody noses, or problems with smell  Negative sore throat, bleeding gums, ulcers, or sores  NECK:  Denies lumps, goiter, pain, swollen glands, or lymphadenopathy  BREASTS:  Denies lumps, pain, nipple discharge, swelling, redness, or any other changes  RESPIRATORY:  Denies cough, wheezing, shortness of breath, dyspnea, or orthopnea  CARDIOVASCULAR:  Denies chest pain or palpitations  GASTROINTESTINAL:  Appetite good, denies nausea, vomiting, or indigestion  Bowel movements normal occurring about once daily or every other day  URINARY:  Denies frequency, urgency, incontinence, dysuria, hematuria, nocturia, or recent flank pain  GENITAL:  Denies vaginal discharge, pelvic infection, lesions, ulcers, or pain  Negative dyspareunia or abnormal vaginal bleeding  PERIPHERAL VASCULAR:  Denies varicosities, swelling, skin changes, or pain  MUSCULOSKELETAL:  Denies back, joint, or muscle pain  Negative problems with mobility or movement  PSYCHIATRIC:  Denies problems with depression, anxiety, anger, or other psychiatric symptoms  Objective:     Physical Exam   Nursing note and vitals reviewed  GENERAL:  Appears well nourished, well groomed, in no acute distress  SKIN:  Palms warm, dry, color good  Nails without clubbing or cyanosis  No lesions, ulcerations, or wounds  HEAD:  Hair is average texture  Scalp without lesions, normocephalic, and atraumatic  EYES:  Visual fields full by confrontation    Conjunctiva pink, sclera white, PERRLA  Extraocular movements intact  EARS:  B/L ear canals clear  B/L TMs clear with + light reflex  NOSE: Mucosa pink, moist, septum midline  Negative sinus tenderness  B/L turbinates pink, moist, non-edematous without exudate  MOUTH:  Oral mucosa pink  Pharynx pink, moist, without swelling, redness, or exudate  Dentition ok  Tongue midline  NECK:  Supple, trachea midline, + thyromegaly, no lymphadenopathy, or swollen glands  LYMPH NODES:  Negative enlargement of neck, axillary, epitrochlear, or inguinal nodes  THORAX/LUNGS  Thorax symmetric with good excursion  Lungs resonant  Breath sounds vesicular with no added sounds  Diaphragm descends within normal limits  CARDIOVASCULAR:  Carotid upstrokes brisk and without bruits  Apical impulse discrete and tapping, barely palpable in the 5th ICS/MCL  Normal S1 and Normal S2, Negative S3 or S4  Negative murmurs, thrills, lifts, or heaves  ABDOMEN:  Protuberant, bowel sounds normal active x 4 quadrants  Negative tenderness  Negative masses  Negative hepatomegaly  Negative splenomegaly  Negative costovertebral tenderness  EXTREMITIES:  Warm, calves supple, non-tender, negative for edema  Negative stasis pigmentation or ulcers  +2 pulses throughout  MUSCULOSKELETAL:  Negative joint deformities  Good range of motion in hands, wrists, elbows, shoulders, spine, hips, knees, and ankles  Negative spinal curvature  NEUROLOGICAL:  Mental status:  Awake, alert, and oriented to person, place, time, and event  Normal thought processes  BREAST EXAM:  B/L Breasts:   Symmetrical without solitary lumps, masses, cysts, nodules, or other lesions  Negative nipple discharge or peau d'orange  Negative skin changes, negative redness, negative swelling, negative skin retraction, negative skin dimpling, and negative opened areas       GENITAL EXAM:  Uterus:   Absent  Left Adnexa/Ovary:  Normal size, non-tender, negative fullness, negative masses  Right Adnexa/Ovary:  Absent  Cervix:  Absent  External/Internal Vaginal Mucosa:  Normal, pink, non-tender, negative lesions, negative ulcerations, negative drainage  Rectal/Vaginal Septum:  Normal, intact, negative masses, non-tender  STOOL HEMOCCULT:  Negative     Thin Prep Pap obtained in usual fashion  Genital Exam chaperoned by Medical Assistant

## 2018-11-16 LAB
HPV HR 12 DNA CVX QL NAA+PROBE: NEGATIVE
HPV16 DNA CVX QL NAA+PROBE: NEGATIVE
HPV18 DNA CVX QL NAA+PROBE: NEGATIVE

## 2018-11-20 ENCOUNTER — TELEPHONE (OUTPATIENT)
Dept: FAMILY MEDICINE CLINIC | Facility: CLINIC | Age: 73
End: 2018-11-20

## 2018-11-23 DIAGNOSIS — R52 PAIN: ICD-10-CM

## 2018-11-23 LAB
LAB AP GYN PRIMARY INTERPRETATION: NORMAL
LAB AP LMP: NORMAL
Lab: NORMAL
PATH INTERP SPEC-IMP: NORMAL

## 2018-11-23 RX ORDER — HYDROCODONE BITARTRATE AND ACETAMINOPHEN 10; 325 MG/1; MG/1
1 TABLET ORAL EVERY 4 HOURS PRN
Qty: 150 TABLET | Refills: 0 | Status: SHIPPED | OUTPATIENT
Start: 2018-11-23 | End: 2018-12-21 | Stop reason: SDUPTHER

## 2018-11-27 DIAGNOSIS — E78.49 OTHER HYPERLIPIDEMIA: ICD-10-CM

## 2018-11-27 DIAGNOSIS — Z11.59 NEED FOR HEPATITIS C SCREENING TEST: ICD-10-CM

## 2018-11-27 DIAGNOSIS — Z13.0 SCREENING FOR DEFICIENCY ANEMIA: Primary | ICD-10-CM

## 2018-11-27 DIAGNOSIS — R73.9 HYPERGLYCEMIA: ICD-10-CM

## 2018-11-27 DIAGNOSIS — E53.8 VITAMIN B12 DEFICIENCY: ICD-10-CM

## 2018-11-27 DIAGNOSIS — E04.1 THYROID NODULE: ICD-10-CM

## 2018-11-27 DIAGNOSIS — E55.9 VITAMIN D DEFICIENCY: ICD-10-CM

## 2018-11-27 DIAGNOSIS — R87.629 ABNORMAL PAP SMEAR OF VAGINA: Primary | ICD-10-CM

## 2018-11-30 DIAGNOSIS — F41.9 ANXIETY: ICD-10-CM

## 2018-11-30 RX ORDER — DIAZEPAM 5 MG/1
5 TABLET ORAL 3 TIMES DAILY PRN
Qty: 90 TABLET | Refills: 0 | Status: SHIPPED | OUTPATIENT
Start: 2018-11-30 | End: 2019-01-17 | Stop reason: SDUPTHER

## 2018-12-05 ENCOUNTER — OFFICE VISIT (OUTPATIENT)
Dept: OBGYN CLINIC | Facility: CLINIC | Age: 73
End: 2018-12-05
Payer: COMMERCIAL

## 2018-12-05 VITALS
BODY MASS INDEX: 25.68 KG/M2 | RESPIRATION RATE: 14 BRPM | HEART RATE: 69 BPM | WEIGHT: 150.4 LBS | HEIGHT: 64 IN | DIASTOLIC BLOOD PRESSURE: 103 MMHG | SYSTOLIC BLOOD PRESSURE: 165 MMHG

## 2018-12-05 DIAGNOSIS — Z96.641 HISTORY OF TOTAL HIP ARTHROPLASTY, RIGHT: ICD-10-CM

## 2018-12-05 DIAGNOSIS — M17.11 ARTHRITIS OF RIGHT KNEE: ICD-10-CM

## 2018-12-05 DIAGNOSIS — G89.29 CHRONIC GROIN PAIN, RIGHT: Primary | ICD-10-CM

## 2018-12-05 DIAGNOSIS — R10.31 CHRONIC GROIN PAIN, RIGHT: Primary | ICD-10-CM

## 2018-12-05 PROCEDURE — 4040F PNEUMOC VAC/ADMIN/RCVD: CPT | Performed by: ORTHOPAEDIC SURGERY

## 2018-12-05 PROCEDURE — 99213 OFFICE O/P EST LOW 20 MIN: CPT | Performed by: ORTHOPAEDIC SURGERY

## 2018-12-05 NOTE — PROGRESS NOTES
72 y o female presents for  Follow-up evaluation of right knee pain arthritis status post cortisone injection on 10/24/2018 as well as right hip pain status post anterior total hip arthroplasty done at [facility]  He reports that ever since her hip replacement she has had right groin pain  She had seen 1 [place] doctor who ordered blood work and bone scan and told her that her hip prosthesis with loose  She saw another orthopedic surgeon [place] NSAID the hip prosthesis was not loose and that this was related to her back  She has been doing home therapy with some improvement in her ability to get in and out of a car and ability to do straight leg raises however she still has moderate pain with that  In general her knees feeling better but still has days of pain  She denies any recent injury trauma or fall  She denies any paresthesias in her lower extremity  She is aware that she has left hip arthritis with decreased range of motion and x-ray findings  She has never seen spine her pain for evaluation of her back and possible injection to see if that would limit the 8 her groin pain  She has also never seen a general surgeon for evaluation of possible femoral or inguinal hernia as a cause of anterior groin pain in this patient      Review of Systems  Review of systems negative unless otherwise specified in HPI    Past Medical History  Past Medical History:   Diagnosis Date    Elevated liver enzymes     last assessed: 12/27/2016    Fracture of rib     last assessed: 08/31/2015    HPV (human papilloma virus) infection     Osteoporosis     Pulmonary nodule     Thyroid nodule        Past Surgical History  Past Surgical History:   Procedure Laterality Date    APPENDECTOMY      last assessed: 08/31/2015    BACK SURGERY      last assessed: 08/31/2015    CHOLECYSTECTOMY      last assessed: 08/31/2015    COLONOSCOPY W/ BIOPSIES      COLPOSCOPY      FOOT SURGERY      last assessed: 08/31/2015    TOTAL ABDOMINAL HYSTERECTOMY      last assessed: 08/31/2015; unilateral oopherectomy    TOTAL HIP ARTHROPLASTY Right     last assessed: 08/31/2015; [address] THYROID BIOPSY         Current Medications  Current Outpatient Prescriptions on File Prior to Visit   Medication Sig Dispense Refill    atenolol (TENORMIN) 50 mg tablet Take 1 tablet (50 mg total) by mouth daily 90 tablet 3    azelastine (ASTELIN) 0 1 % nasal spray 1 spray into each nostril 2 (two) times a day Use in each nostril as directed 30 mL 5    Calcium Carbonate-Vitamin D3 (CALCIUM 600/VITAMIN D) 600-400 MG-UNIT TABS Take 1 tablet by mouth 2 (two) times a day 60 tablet 5    COMBIVENT RESPIMAT inhaler inhale 1 puff by mouth four times a day MAX OF 6 PUFFS IN 24 HOURS  0    diazepam (VALIUM) 5 mg tablet Take 1 tablet (5 mg total) by mouth 3 (three) times a day as needed for anxiety 90 tablet 0    HYDROcodone-acetaminophen (NORCO)  mg per tablet Take 1 tablet by mouth every 4 (four) hours as needed for moderate pain Max Daily Amount: 6 tablets 150 tablet 0    Magnesium 500 MG TABS Take 1 tablet by mouth daily      omeprazole (PRILOSEC) 20 mg delayed release capsule Take 1 capsule (20 mg total) by mouth 2 (two) times a day 180 capsule 3    Tiotropium Bromide-Olodaterol (STIOLTO RESPIMAT) 2 5-2 5 MCG/ACT AERS Inhale 1 puff daily      venlafaxine (EFFEXOR-XR) 75 mg 24 hr capsule Take 3 capsules (225 mg total) by mouth daily 90 capsule 0     No current facility-administered medications on file prior to visit  Recent Labs (HCT,HGB,PT,INR,ESR,CRP,GLU,HgA1C)  No results found for: HCT, HGB, WBC, PT, INR, ESR, CRP, GLUCOSE, HGBA1C    Physical exam  · General: Awake, Alert, Oriented  · Eyes: Pupils equal, round and reactive to light  · Heart: regular rate and rhythm  · Lungs: No audible wheezing  · Abdomen: soft  right Knee exam  ·  no effusion erythema nor warmth  Some medial joint line tenderness no lateral joint line tenderness    Patella tracks midline there is no gross ligamentous laxity  She is grossly neurovascular intact negative Jessica's  Negative Apley's  ·   Right hip pes well-healed anterior incision without erythema  No gross hernia palpated  No fluctuance  In the supine position the right leg is approximately 1/2 inch short by gross measurements in the seated position the hip appears slightly short and there is obvious atrophy of the quadriceps of the right leg  Internal and external rotation of the hip in the seated position elicit no pain and show good motion  The left hip notes limited internal rotation secondary arthritis  She has a negative seated and supine straight leg raise on the right side for reproducible back or radicular pain  There is no pain of the right hip with axial loading of the hip at 45 and 90  There is no pain over the greater troch to palpation  She is able do a supine straight leg raise on the right side approximately 12 in off the table and could only do this approximately 2 in at the last visit due to groin pain  Procedure    Right anterior total hip arthroplasty done at [facility] approximately 4 years ago  Imaging    None today but old x-rays of the hip and right knee reviewed noting satisfactory position alignment of the right hip prosthesis and mild arthritic changes about the right knee  Of note is that you can see left hip arthritis on the AP pelvis as well  Previous bone scan from [facility] was reviewed which is not definitive for loosening  1  Chronic groin pain, right    2  Arthritis of right knee    3  History of total hip arthroplasty, right      Assessment:  right knee arthritis improved with cortisone injection  Status post right total hip arthroplasty with chronic right groin pain  Plan:   Due to the patient's continued right hip discomfort we will further evaluate this    We will [facility] to give evaluation of possible lumbosacral cause of her groin pain and possible injection therapy as they may see fit  Also have the patient see general surgery for evaluation for possible right groin inguinal or femoral hernia  Will also do a tagged WBC scan to rule out infective process at the right hip along with routine blood work to include sed rate, CRP, CBC  Also have her try facilitate getting a 0 5 in heel lift to be worn in the right shoe  Patient is to continue physical therapy at home working on the hip and knee  See the patient back in approximately 3-4 weeks with lab results and scan results

## 2018-12-05 NOTE — PATIENT INSTRUCTIONS
Due to the patient's continued right hip discomfort we will further evaluate this  We will have the [facility] see [facility] to give evaluation of possible lumbosacral cause of her groin pain and possible injection therapy as they may see fit  Also have the patient see general surgery for evaluation for possible right groin inguinal or femoral hernia  Will also do a tagged WBC scan to rule out infective process at the right hip along with routine blood work to include sed rate, CRP, CBC  Also have her try facilitate getting a 0 5 in heel lift to be worn in the right shoe  Patient is to continue physical therapy at home working on the hip and knee  See the patient back in approximately 3-4 weeks with lab results and scan results

## 2018-12-13 RX ORDER — DIAZEPAM 10 MG/1
10 TABLET ORAL 3 TIMES DAILY PRN
Refills: 0 | COMMUNITY
Start: 2018-11-30 | End: 2019-01-04 | Stop reason: SDUPTHER

## 2018-12-18 ENCOUNTER — CONSULT (OUTPATIENT)
Dept: SURGERY | Facility: HOSPITAL | Age: 73
End: 2018-12-18
Payer: COMMERCIAL

## 2018-12-18 VITALS
SYSTOLIC BLOOD PRESSURE: 110 MMHG | HEIGHT: 64 IN | WEIGHT: 150 LBS | BODY MASS INDEX: 25.61 KG/M2 | DIASTOLIC BLOOD PRESSURE: 75 MMHG | HEART RATE: 69 BPM | TEMPERATURE: 98.1 F

## 2018-12-18 DIAGNOSIS — G89.29 CHRONIC GROIN PAIN, RIGHT: ICD-10-CM

## 2018-12-18 DIAGNOSIS — R10.31 CHRONIC GROIN PAIN, RIGHT: ICD-10-CM

## 2018-12-18 PROCEDURE — 99204 OFFICE O/P NEW MOD 45 MIN: CPT | Performed by: SURGERY

## 2018-12-18 NOTE — LETTER
December 19, 2018     Jono Abbott PA-C  [address]    Patient: Rebeka Deleon   YOB: 1945   Date of Visit: 12/18/2018       Dear Dr Carlo Newell: Thank you for referring Monica Triana to me for evaluation  Below are my notes for this consultation  If you have questions, please do not hesitate to call me  I look forward to following your patient along with you  Sincerely,        Kamille Amador DO        CC: No Recipients  Kamille Amador DO  12/19/2018  4:55 PM  Sign at close encounter  Assessment/Plan:    Chronic groin pain, right  History of hip replacement on the right 2014, chronic pain of the right groin with radiation down her leg  Initially by multiple orthopedic specialist with x-ray showing no significant abnormality  Patient is being sent to spine pain for evaluation  She was sent to me for evaluation of potential hernia  On exam no obvious hernia noted  She does seem to have more pain with increased activity in addition to AB duction of the hip  Patient was offered both ultrasound and CT for further evaluation of potential hernia although none was found on clinical exam and patient refused  Patient is currently undergoing physical therapy and states that she is improving  I would recommend continue physical therapy and also to follow up with her evaluation with [facility]  She will follow up with me as needed  Diagnoses and all orders for this visit:    Chronic groin pain, right  -     Ambulatory referral to General Surgery    Other orders  -     diazepam (VALIUM) 10 mg tablet; Take 10 mg by mouth 3 (three) times a day as needed          Subjective:      Patient ID: Rebeka Deleon is a 72 y o  female  80-year-old female with history of hip replacement 2014 presents today for evaluation potential right groin hernias patient has had chronic right groin pain for the past 2 years    Patient states that she was evaluated by original orthopedist in addition to 2nd opinion will felt that the hip replacement was in satisfactory position with x-ray showing no significant abnormality  She recently saw Dr Pablo Gordon who evaluated her and felt she should see both general surgery and spine and pain for further evaluation  She states she has had the pain for about 2 years off and on and now become more frequent and and and more intense  Pain starts mostly in her groin and will occasionally wrap around her lower back and also radiate down her leg mostly anteriorly  Denies any noticeable bulge along the right groin  Notices no difference with lifting heavy objects  Denies any trauma  Denies any back pain  She states the pain is worse with increased activity and also when she stretches her leg out and more of an abduction fashion  She is currently physical therapy in states that she does healed that it is helping and she is improving  Denies any other associated symptoms  No nausea vomiting  No fevers or chills  No chest pain  No shortness of breath  No abdominal pain  The following portions of the patient's history were reviewed and updated as appropriate:   She  has a past medical history of Elevated liver enzymes; Fracture of rib; HPV (human papilloma virus) infection; Osteoporosis; Pulmonary nodule; and Thyroid nodule    She   Patient Active Problem List    Diagnosis Date Noted    Chronic groin pain, right 12/19/2018    Thyroid nodule 10/16/2018    Chronic pain of right knee 10/16/2018    Pulmonary nodules 09/11/2018    Cytologic evidence of malignancy on smear of cervix 09/11/2018    Asymptomatic microscopic hematuria 06/11/2018    Rash of face 06/11/2018    Essential hypertension 03/22/2018    Other hyperlipidemia 03/22/2018    Screening for genitourinary condition 03/22/2018    Screening for neurological condition 03/22/2018    COPD with acute exacerbation ([facility] [address] ) 12/13/2017    Hyperlipidemia 08/31/2015    Hypertension 08/31/2015    Inflammation of sacroiliac joint ([facility] [address] ) 08/29/2015    Lumbar disc disease 08/18/2014    HTN, goal below 140/90 08/24/2011     She  has a past surgical history that includes Appendectomy; Back surgery; Cholecystectomy; Foot surgery; Total hip arthroplasty (Right); Total abdominal hysterectomy; Colposcopy; Colonoscopy w/ biopsies; and US guided thyroid biopsy  Her family history includes Diabetes type II in her mother; Heart disease in her father and mother  She  reports that she has been smoking  She has never used smokeless tobacco  She reports that she does not drink alcohol or use drugs    Current Outpatient Prescriptions   Medication Sig Dispense Refill    atenolol (TENORMIN) 50 mg tablet Take 1 tablet (50 mg total) by mouth daily 90 tablet 3    azelastine (ASTELIN) 0 1 % nasal spray 1 spray into each nostril 2 (two) times a day Use in each nostril as directed 30 mL 5    Calcium Carbonate-Vitamin D3 (CALCIUM 600/VITAMIN D) 600-400 MG-UNIT TABS Take 1 tablet by mouth 2 (two) times a day 60 tablet 5    COMBIVENT RESPIMAT inhaler inhale 1 puff by mouth four times a day MAX OF 6 PUFFS IN 24 HOURS  0    diazepam (VALIUM) 10 mg tablet Take 10 mg by mouth 3 (three) times a day as needed  0    diazepam (VALIUM) 5 mg tablet Take 1 tablet (5 mg total) by mouth 3 (three) times a day as needed for anxiety 90 tablet 0    HYDROcodone-acetaminophen (NORCO)  mg per tablet Take 1 tablet by mouth every 4 (four) hours as needed for moderate pain Max Daily Amount: 6 tablets 150 tablet 0    Magnesium 500 MG TABS Take 1 tablet by mouth daily      omeprazole (PRILOSEC) 20 mg delayed release capsule Take 1 capsule (20 mg total) by mouth 2 (two) times a day 180 capsule 3    Tiotropium Bromide-Olodaterol (STIOLTO RESPIMAT) 2 5-2 5 MCG/ACT AERS Inhale 1 puff daily      venlafaxine (EFFEXOR-XR) 75 mg 24 hr capsule Take 3 capsules (225 mg total) by mouth daily 90 capsule 0     No current facility-administered medications for this visit  She has No Known Allergies       Review of Systems      A 10 point review systems was conducted, all negative except as above HPI  Objective:      /75   Pulse 69   Temp 98 1 °F (36 7 °C)   Ht 5' 4" (1 626 m)   Wt 68 kg (150 lb)   BMI 25 75 kg/m²           Physical Exam   Constitutional: She is oriented to person, place, and time  She appears well-developed and well-nourished  No distress  HENT:   Head: Normocephalic and atraumatic  Eyes: No scleral icterus  Neck: Normal range of motion  Neck supple  No tracheal deviation present  Cardiovascular: Normal rate, regular rhythm and normal heart sounds  Exam reveals no gallop and no friction rub  No murmur heard  Pulmonary/Chest: Effort normal and breath sounds normal  No respiratory distress  She has no wheezes  She has no rales  She exhibits no tenderness  Abdominal: Soft  She exhibits no distension and no mass  There is no tenderness  There is no rebound and no guarding  Hernia confirmed negative in the right inguinal area and confirmed negative in the left inguinal area  Genitourinary:   Genitourinary Comments: There is some tenderness with palpation along the right groin in the more inferior medial portion in overlying the pubic tubercle  Musculoskeletal: She exhibits tenderness (Right groin)  She exhibits no edema or deformity  Lymphadenopathy:     She has no cervical adenopathy  Right: No inguinal adenopathy present  Left: No inguinal adenopathy present  Neurological: She is alert and oriented to person, place, and time  No cranial nerve deficit  Skin: Skin is warm and dry  No rash noted  She is not diaphoretic  No erythema  No pallor  Psychiatric: She has a normal mood and affect  Her behavior is normal    Vitals reviewed

## 2018-12-19 PROBLEM — G89.29 CHRONIC GROIN PAIN, RIGHT: Status: ACTIVE | Noted: 2018-12-19

## 2018-12-19 PROBLEM — R10.31 CHRONIC GROIN PAIN, RIGHT: Status: ACTIVE | Noted: 2018-12-19

## 2018-12-19 NOTE — PROGRESS NOTES
Assessment/Plan:    Chronic groin pain, right  History of hip replacement on the right 2014, chronic pain of the right groin with radiation down her leg  Initially by multiple orthopedic specialist with x-ray showing no significant abnormality  Patient is being sent to spine pain for evaluation  She was sent to me for evaluation of potential hernia  On exam no obvious hernia noted  She does seem to have more pain with increased activity in addition to AB duction of the hip  Patient was offered both ultrasound and CT for further evaluation of potential hernia although none was found on clinical exam and patient refused  Patient is currently undergoing physical therapy and states that she is improving  I would recommend continue physical therapy and also to follow up with her evaluation with Spine and Pain Service  She will follow up with me as needed  Diagnoses and all orders for this visit:    Chronic groin pain, right  -     Ambulatory referral to General Surgery    Other orders  -     diazepam (VALIUM) 10 mg tablet; Take 10 mg by mouth 3 (three) times a day as needed          Subjective:      Patient ID: Myesha Syed is a 72 y o  female  81-year-old female with history of hip replacement 2014 presents today for evaluation potential right groin hernias patient has had chronic right groin pain for the past 2 years  Patient states that she was evaluated by original orthopedist in addition to 2nd opinion will felt that the hip replacement was in satisfactory position with x-ray showing no significant abnormality  She recently saw Dr Nathaniel Arellano who evaluated her and felt she should see both general surgery and spine and pain for further evaluation  She states she has had the pain for about 2 years off and on and now become more frequent and and and more intense  Pain starts mostly in her groin and will occasionally wrap around her lower back and also radiate down her leg mostly anteriorly    Denies any noticeable bulge along the right groin  Notices no difference with lifting heavy objects  Denies any trauma  Denies any back pain  She states the pain is worse with increased activity and also when she stretches her leg out and more of an abduction fashion  She is currently physical therapy in states that she does healed that it is helping and she is improving  Denies any other associated symptoms  No nausea vomiting  No fevers or chills  No chest pain  No shortness of breath  No abdominal pain  The following portions of the patient's history were reviewed and updated as appropriate:   She  has a past medical history of Elevated liver enzymes; Fracture of rib; HPV (human papilloma virus) infection; Osteoporosis; Pulmonary nodule; and Thyroid nodule  She   Patient Active Problem List    Diagnosis Date Noted    Chronic groin pain, right 12/19/2018    Thyroid nodule 10/16/2018    Chronic pain of right knee 10/16/2018    Pulmonary nodules 09/11/2018    Cytologic evidence of malignancy on smear of cervix 09/11/2018    Asymptomatic microscopic hematuria 06/11/2018    Rash of face 06/11/2018    Essential hypertension 03/22/2018    Other hyperlipidemia 03/22/2018    Screening for genitourinary condition 03/22/2018    Screening for neurological condition 03/22/2018    COPD with acute exacerbation ([facility] ) 12/13/2017    Hyperlipidemia 08/31/2015    Hypertension 08/31/2015    Inflammation of sacroiliac joint ([facility] ) 08/29/2015    Lumbar disc disease 08/18/2014    HTN, goal below 140/90 08/24/2011     She  has a past surgical history that includes Appendectomy; Back surgery; Cholecystectomy; Foot surgery; Total hip arthroplasty (Right); Total abdominal hysterectomy; Colposcopy; Colonoscopy w/ biopsies; and US guided thyroid biopsy  Her family history includes Diabetes type II in her mother; Heart disease in her father and mother  She  reports that she has been smoking    She has never used smokeless tobacco  She reports that she does not drink alcohol or use drugs  Current Outpatient Prescriptions   Medication Sig Dispense Refill    atenolol (TENORMIN) 50 mg tablet Take 1 tablet (50 mg total) by mouth daily 90 tablet 3    azelastine (ASTELIN) 0 1 % nasal spray 1 spray into each nostril 2 (two) times a day Use in each nostril as directed 30 mL 5    Calcium Carbonate-Vitamin D3 (CALCIUM 600/VITAMIN D) 600-400 MG-UNIT TABS Take 1 tablet by mouth 2 (two) times a day 60 tablet 5    COMBIVENT RESPIMAT inhaler inhale 1 puff by mouth four times a day MAX OF 6 PUFFS IN 24 HOURS  0    diazepam (VALIUM) 10 mg tablet Take 10 mg by mouth 3 (three) times a day as needed  0    diazepam (VALIUM) 5 mg tablet Take 1 tablet (5 mg total) by mouth 3 (three) times a day as needed for anxiety 90 tablet 0    HYDROcodone-acetaminophen (NORCO)  mg per tablet Take 1 tablet by mouth every 4 (four) hours as needed for moderate pain Max Daily Amount: 6 tablets 150 tablet 0    Magnesium 500 MG TABS Take 1 tablet by mouth daily      omeprazole (PRILOSEC) 20 mg delayed release capsule Take 1 capsule (20 mg total) by mouth 2 (two) times a day 180 capsule 3    Tiotropium Bromide-Olodaterol (STIOLTO RESPIMAT) 2 5-2 5 MCG/ACT AERS Inhale 1 puff daily      venlafaxine (EFFEXOR-XR) 75 mg 24 hr capsule Take 3 capsules (225 mg total) by mouth daily 90 capsule 0     No current facility-administered medications for this visit  She has No Known Allergies       Review of Systems      A 10 point review systems was conducted, all negative except as above HPI  Objective:      /75   Pulse 69   Temp 98 1 °F (36 7 °C)   Ht 5' 4" (1 626 m)   Wt 68 kg (150 lb)   BMI 25 75 kg/m²          Physical Exam   Constitutional: She is oriented to person, place, and time  She appears well-developed and well-nourished  No distress  HENT:   Head: Normocephalic and atraumatic  Eyes: No scleral icterus     Neck: Normal range of motion  Neck supple  No tracheal deviation present  Cardiovascular: Normal rate, regular rhythm and normal heart sounds  Exam reveals no gallop and no friction rub  No murmur heard  Pulmonary/Chest: Effort normal and breath sounds normal  No respiratory distress  She has no wheezes  She has no rales  She exhibits no tenderness  Abdominal: Soft  She exhibits no distension and no mass  There is no tenderness  There is no rebound and no guarding  Hernia confirmed negative in the right inguinal area and confirmed negative in the left inguinal area  Genitourinary:   Genitourinary Comments: There is some tenderness with palpation along the right groin in the more inferior medial portion in overlying the pubic tubercle  Musculoskeletal: She exhibits tenderness (Right groin)  She exhibits no edema or deformity  Lymphadenopathy:     She has no cervical adenopathy  Right: No inguinal adenopathy present  Left: No inguinal adenopathy present  Neurological: She is alert and oriented to person, place, and time  No cranial nerve deficit  Skin: Skin is warm and dry  No rash noted  She is not diaphoretic  No erythema  No pallor  Psychiatric: She has a normal mood and affect  Her behavior is normal    Vitals reviewed

## 2018-12-19 NOTE — ASSESSMENT & PLAN NOTE
History of hip replacement on the right 2014, chronic pain of the right groin with radiation down her leg  Initially by multiple orthopedic specialist with x-ray showing no significant abnormality  Patient is being sent to spine pain for evaluation  She was sent to me for evaluation of potential hernia  On exam no obvious hernia noted  She does seem to have more pain with increased activity in addition to AB duction of the hip  Patient was offered both ultrasound and CT for further evaluation of potential hernia although none was found on clinical exam and patient refused  Patient is currently undergoing physical therapy and states that she is improving  I would recommend continue physical therapy and also to follow up with her evaluation with Spine and Pain Service  She will follow up with me as needed

## 2018-12-21 DIAGNOSIS — R52 PAIN: ICD-10-CM

## 2018-12-21 RX ORDER — HYDROCODONE BITARTRATE AND ACETAMINOPHEN 10; 325 MG/1; MG/1
1 TABLET ORAL EVERY 4 HOURS PRN
Qty: 150 TABLET | Refills: 0 | Status: SHIPPED | OUTPATIENT
Start: 2018-12-21 | End: 2019-01-17

## 2019-01-03 ENCOUNTER — TELEPHONE (OUTPATIENT)
Dept: OBGYN CLINIC | Facility: HOSPITAL | Age: 74
End: 2019-01-03

## 2019-01-03 NOTE — TELEPHONE ENCOUNTER
Called pt and informed her we do accept Leisa Chakraborty and to bring her insurance cards with her to her appointment

## 2019-01-03 NOTE — TELEPHONE ENCOUNTER
Lm for pt to cb  Pt scheduled for consult tomorrow with RM  Pt should keep appt as scheduled  Clerical please advise on new insurance, [employer]  Does RM take this? If so, will advise pt to bring new ID card at time of appt

## 2019-01-03 NOTE — TELEPHONE ENCOUNTER
Patient is calling     [phone]  Patient has an appt tomorrow but did not get her blood work & she wants to know if that is going to mess up anything with her appt tomorrow  Patient also got new insurance eff 1/1/19  Patient is stating that she has Prettynati Mosquera but when I ran it it stated that it couldn't be processed

## 2019-01-04 DIAGNOSIS — F41.9 ANXIETY: Primary | ICD-10-CM

## 2019-01-04 RX ORDER — DIAZEPAM 10 MG/1
10 TABLET ORAL EVERY 8 HOURS PRN
Qty: 30 TABLET | Refills: 0 | Status: SHIPPED | OUTPATIENT
Start: 2019-01-04 | End: 2019-02-21 | Stop reason: SDUPTHER

## 2019-01-08 ENCOUNTER — TELEPHONE (OUTPATIENT)
Dept: PAIN MEDICINE | Facility: MEDICAL CENTER | Age: 74
End: 2019-01-08

## 2019-01-08 ENCOUNTER — TELEPHONE (OUTPATIENT)
Dept: OBGYN CLINIC | Facility: CLINIC | Age: 74
End: 2019-01-08

## 2019-01-08 ENCOUNTER — CLINICAL SUPPORT (OUTPATIENT)
Dept: FAMILY MEDICINE CLINIC | Facility: CLINIC | Age: 74
End: 2019-01-08
Payer: COMMERCIAL

## 2019-01-08 DIAGNOSIS — E55.9 VITAMIN D DEFICIENCY: ICD-10-CM

## 2019-01-08 DIAGNOSIS — Z13.0 SCREENING FOR DEFICIENCY ANEMIA: ICD-10-CM

## 2019-01-08 DIAGNOSIS — R73.9 HYPERGLYCEMIA: ICD-10-CM

## 2019-01-08 DIAGNOSIS — E78.5 HYPERLIPIDEMIA, UNSPECIFIED HYPERLIPIDEMIA TYPE: ICD-10-CM

## 2019-01-08 DIAGNOSIS — Z96.641 HISTORY OF TOTAL HIP ARTHROPLASTY, RIGHT: ICD-10-CM

## 2019-01-08 DIAGNOSIS — E53.9 VITAMIN B DEFICIENCY: ICD-10-CM

## 2019-01-08 DIAGNOSIS — Z11.59 NEED FOR HEPATITIS C SCREENING TEST: Primary | ICD-10-CM

## 2019-01-08 DIAGNOSIS — Z96.641 HISTORY OF TOTAL RIGHT HIP ARTHROPLASTY: ICD-10-CM

## 2019-01-08 DIAGNOSIS — E04.1 THYROID NODULE: ICD-10-CM

## 2019-01-08 LAB
25(OH)D3 SERPL-MCNC: 30.6 NG/ML (ref 30–100)
ALBUMIN SERPL BCP-MCNC: 3.4 G/DL (ref 3.5–5)
ALP SERPL-CCNC: 90 U/L (ref 46–116)
ALT SERPL W P-5'-P-CCNC: 21 U/L (ref 12–78)
ANION GAP SERPL CALCULATED.3IONS-SCNC: 5 MMOL/L (ref 4–13)
AST SERPL W P-5'-P-CCNC: 16 U/L (ref 5–45)
BASOPHILS # BLD AUTO: 0.09 THOUSANDS/ΜL (ref 0–0.1)
BASOPHILS NFR BLD AUTO: 1 % (ref 0–1)
BILIRUB SERPL-MCNC: 0.22 MG/DL (ref 0.2–1)
BUN SERPL-MCNC: 20 MG/DL (ref 5–25)
CALCIUM SERPL-MCNC: 8.5 MG/DL (ref 8.3–10.1)
CHLORIDE SERPL-SCNC: 109 MMOL/L (ref 100–108)
CHOLEST SERPL-MCNC: 196 MG/DL (ref 50–200)
CO2 SERPL-SCNC: 25 MMOL/L (ref 21–32)
CREAT SERPL-MCNC: 0.64 MG/DL (ref 0.6–1.3)
CRP SERPL QL: <3 MG/L
EOSINOPHIL # BLD AUTO: 0.24 THOUSAND/ΜL (ref 0–0.61)
EOSINOPHIL NFR BLD AUTO: 3 % (ref 0–6)
ERYTHROCYTE [DISTWIDTH] IN BLOOD BY AUTOMATED COUNT: 13.3 % (ref 11.6–15.1)
ERYTHROCYTE [SEDIMENTATION RATE] IN BLOOD: 14 MM/HOUR (ref 0–20)
EST. AVERAGE GLUCOSE BLD GHB EST-MCNC: 123 MG/DL
GFR SERPL CREATININE-BSD FRML MDRD: 90 ML/MIN/1.73SQ M
GLUCOSE P FAST SERPL-MCNC: 86 MG/DL (ref 65–99)
HBA1C MFR BLD: 5.9 % (ref 4.2–6.3)
HCT VFR BLD AUTO: 46.3 % (ref 34.8–46.1)
HDLC SERPL-MCNC: 46 MG/DL (ref 40–60)
HGB BLD-MCNC: 14.8 G/DL (ref 11.5–15.4)
IMM GRANULOCYTES # BLD AUTO: 0.03 THOUSAND/UL (ref 0–0.2)
IMM GRANULOCYTES NFR BLD AUTO: 0 % (ref 0–2)
LDLC SERPL CALC-MCNC: 122 MG/DL (ref 0–100)
LYMPHOCYTES # BLD AUTO: 1.7 THOUSANDS/ΜL (ref 0.6–4.47)
LYMPHOCYTES NFR BLD AUTO: 19 % (ref 14–44)
MCH RBC QN AUTO: 31.2 PG (ref 26.8–34.3)
MCHC RBC AUTO-ENTMCNC: 32 G/DL (ref 31.4–37.4)
MCV RBC AUTO: 98 FL (ref 82–98)
MONOCYTES # BLD AUTO: 0.66 THOUSAND/ΜL (ref 0.17–1.22)
MONOCYTES NFR BLD AUTO: 7 % (ref 4–12)
NEUTROPHILS # BLD AUTO: 6.36 THOUSANDS/ΜL (ref 1.85–7.62)
NEUTS SEG NFR BLD AUTO: 70 % (ref 43–75)
NONHDLC SERPL-MCNC: 150 MG/DL
NRBC BLD AUTO-RTO: 0 /100 WBCS
PLATELET # BLD AUTO: 295 THOUSANDS/UL (ref 149–390)
PMV BLD AUTO: 9.8 FL (ref 8.9–12.7)
POTASSIUM SERPL-SCNC: 4 MMOL/L (ref 3.5–5.3)
PROT SERPL-MCNC: 7.1 G/DL (ref 6.4–8.2)
RBC # BLD AUTO: 4.74 MILLION/UL (ref 3.81–5.12)
SODIUM SERPL-SCNC: 139 MMOL/L (ref 136–145)
TRIGL SERPL-MCNC: 142 MG/DL
TSH SERPL DL<=0.05 MIU/L-ACNC: 0.63 UIU/ML (ref 0.36–3.74)
VIT B12 SERPL-MCNC: 462 PG/ML (ref 100–900)
WBC # BLD AUTO: 9.08 THOUSAND/UL (ref 4.31–10.16)

## 2019-01-08 PROCEDURE — 85025 COMPLETE CBC W/AUTO DIFF WBC: CPT | Performed by: FAMILY MEDICINE

## 2019-01-08 PROCEDURE — 86803 HEPATITIS C AB TEST: CPT | Performed by: FAMILY MEDICINE

## 2019-01-08 PROCEDURE — 80053 COMPREHEN METABOLIC PANEL: CPT | Performed by: FAMILY MEDICINE

## 2019-01-08 PROCEDURE — 80061 LIPID PANEL: CPT | Performed by: FAMILY MEDICINE

## 2019-01-08 PROCEDURE — 36415 COLL VENOUS BLD VENIPUNCTURE: CPT | Performed by: FAMILY MEDICINE

## 2019-01-08 PROCEDURE — 82607 VITAMIN B-12: CPT | Performed by: FAMILY MEDICINE

## 2019-01-08 PROCEDURE — 84443 ASSAY THYROID STIM HORMONE: CPT | Performed by: FAMILY MEDICINE

## 2019-01-08 PROCEDURE — 85652 RBC SED RATE AUTOMATED: CPT | Performed by: FAMILY MEDICINE

## 2019-01-08 PROCEDURE — 82306 VITAMIN D 25 HYDROXY: CPT | Performed by: FAMILY MEDICINE

## 2019-01-08 PROCEDURE — 83036 HEMOGLOBIN GLYCOSYLATED A1C: CPT | Performed by: FAMILY MEDICINE

## 2019-01-08 PROCEDURE — 86140 C-REACTIVE PROTEIN: CPT | Performed by: FAMILY MEDICINE

## 2019-01-08 NOTE — TELEPHONE ENCOUNTER
I have reviewed this with IT, and the MA at the [facility] already reordered these 3 tests sed rate, CRP, CBC for clinic collect in the office today so per IT there is nothing for us to do as this was already changed

## 2019-01-09 LAB — HCV AB SER QL: NORMAL

## 2019-01-10 ENCOUNTER — TELEPHONE (OUTPATIENT)
Dept: OBGYN CLINIC | Facility: CLINIC | Age: 74
End: 2019-01-10

## 2019-01-10 NOTE — TELEPHONE ENCOUNTER
Dr Laureen Mckeon is scheduled for a nuclear test on 1/16/19 and she would like to speak to you about what the test is for and what it entails  Best contact # [phone]    Thank you

## 2019-01-11 NOTE — TELEPHONE ENCOUNTER
Called patient and left a message on her answering machine that the WBC scan is to check to see if there is infection in her hip which could cause loosening and/or pain and that if she had any specifics about how the test is done she should call the nuclear medicine department

## 2019-01-17 ENCOUNTER — OFFICE VISIT (OUTPATIENT)
Dept: FAMILY MEDICINE CLINIC | Facility: CLINIC | Age: 74
End: 2019-01-17
Payer: COMMERCIAL

## 2019-01-17 VITALS
SYSTOLIC BLOOD PRESSURE: 132 MMHG | DIASTOLIC BLOOD PRESSURE: 82 MMHG | RESPIRATION RATE: 14 BRPM | WEIGHT: 148.6 LBS | OXYGEN SATURATION: 97 % | HEART RATE: 72 BPM | BODY MASS INDEX: 25.37 KG/M2 | HEIGHT: 64 IN

## 2019-01-17 DIAGNOSIS — G89.4 CHRONIC PAIN SYNDROME: ICD-10-CM

## 2019-01-17 DIAGNOSIS — J44.9 CHRONIC OBSTRUCTIVE PULMONARY DISEASE, UNSPECIFIED COPD TYPE (HCC): Primary | ICD-10-CM

## 2019-01-17 DIAGNOSIS — F41.9 ANXIETY: ICD-10-CM

## 2019-01-17 DIAGNOSIS — K21.9 GASTROESOPHAGEAL REFLUX DISEASE, ESOPHAGITIS PRESENCE NOT SPECIFIED: ICD-10-CM

## 2019-01-17 DIAGNOSIS — J44.1 COPD WITH ACUTE EXACERBATION (HCC): ICD-10-CM

## 2019-01-17 PROCEDURE — 99214 OFFICE O/P EST MOD 30 MIN: CPT | Performed by: FAMILY MEDICINE

## 2019-01-17 RX ORDER — OMEPRAZOLE 20 MG/1
20 CAPSULE, DELAYED RELEASE ORAL 2 TIMES DAILY
Qty: 180 CAPSULE | Refills: 3 | Status: SHIPPED | OUTPATIENT
Start: 2019-01-17 | End: 2019-05-10

## 2019-01-17 RX ORDER — HYDROCODONE BITARTRATE AND ACETAMINOPHEN 5; 325 MG/1; MG/1
1 TABLET ORAL EVERY 6 HOURS PRN
Qty: 90 TABLET | Refills: 0 | Status: SHIPPED | OUTPATIENT
Start: 2019-01-17 | End: 2019-02-14 | Stop reason: SDUPTHER

## 2019-01-17 RX ORDER — DIAZEPAM 5 MG/1
5 TABLET ORAL 3 TIMES DAILY PRN
Qty: 90 TABLET | Refills: 0 | Status: SHIPPED | OUTPATIENT
Start: 2019-01-17 | End: 2019-03-08 | Stop reason: SDUPTHER

## 2019-01-17 NOTE — PATIENT INSTRUCTIONS
Please taper the hydrocodone to 5 mg three times a day (15 mg total) for about a week  Then taper to twice a day (10 mg total) for about a week  Then taper to once a day (5 mg) for about a week  Then take only as needed

## 2019-01-17 NOTE — PROGRESS NOTES
Assessment/Plan:    No problem-specific Assessment & Plan notes found for this encounter  Diagnoses and all orders for this visit:    Chronic obstructive pulmonary disease, unspecified COPD type ([facility] )  -     tiotropium-olodaterol (STIOLTO RESPIMAT) 2 5-2 5 MCG/ACT inhaler; Inhale 1 puff daily    Gastroesophageal reflux disease, esophagitis presence not specified  -     omeprazole (PRILOSEC) 20 mg delayed release capsule; Take 1 capsule (20 mg total) by mouth 2 (two) times a day    Anxiety  -     diazepam (VALIUM) 5 mg tablet; Take 1 tablet (5 mg total) by mouth 3 (three) times a day as needed for anxiety    Chronic pain syndrome  -     HYDROcodone-acetaminophen (NORCO) 5-325 mg per tablet; Take 1 tablet by mouth every 6 (six) hours as needed for pain Max Daily Amount: 4 tablets    COPD with acute exacerbation (HCC)          Subjective:      Patient ID: Rudy Ozuna is a 72 y o  female  She has been taking Hydrocodone for 20 years  She has started praying and her pain has gone  She would like to stop Vicodin  She is taking two tablets daily because she is concerned about withdrawal     Her BP is well controlled on her current regimen  She has no CP or SOB  She has no HA or vision changes  She has COPD  She is experiencing a mild flare  Walking up hill makes it worse  The following portions of the patient's history were reviewed and updated as appropriate:   She  has a past medical history of Elevated liver enzymes; Fracture of rib; HPV (human papilloma virus) infection; Osteoporosis; Pulmonary nodule; and Thyroid nodule    She   Patient Active Problem List    Diagnosis Date Noted    Chronic groin pain, right 12/19/2018    Thyroid nodule 10/16/2018    Chronic pain of right knee 10/16/2018    Pulmonary nodules 09/11/2018    Cytologic evidence of malignancy on smear of cervix 09/11/2018    Asymptomatic microscopic hematuria 06/11/2018    Rash of face 06/11/2018    Essential hypertension 03/22/2018    Other hyperlipidemia 03/22/2018    Screening for genitourinary condition 03/22/2018    Screening for neurological condition 03/22/2018    COPD with acute exacerbation ([facility] ) 12/13/2017    Hyperlipidemia 08/31/2015    Hypertension 08/31/2015    [facility] of [facility]ro[facility]ac joint ([facility] ) 08/29/2015    Lumbar disc disease 08/18/2014    HTN, goal below 140/90 08/24/2011     She  has a past surgical history that includes Appendectomy; Back surgery; Cholecystectomy; Foot surgery; Total hip arthroplasty (Right); Total abdominal hysterectomy; Colposcopy; Colonoscopy w/ biopsies; and US guided thyroid biopsy  Her family history includes Diabetes type II in her mother; Heart disease in her father and mother  She  reports that she has been smoking  She has never used smokeless tobacco  She reports that she does not drink alcohol or use drugs    Current Outpatient Prescriptions   Medication Sig Dispense Refill    atenolol (TENORMIN) 50 mg tablet Take 1 tablet (50 mg total) by mouth daily 90 tablet 3    azelastine (ASTELIN) 0 1 % nasal spray 1 spray into each nostril 2 (two) times a day Use in each nostril as directed 30 mL 5    Calcium Carbonate-Vitamin D3 (CALCIUM 600/VITAMIN D) 600-400 MG-UNIT TABS Take 1 tablet by mouth 2 (two) times a day 60 tablet 5    COMBIVENT RESPIMAT inhaler inhale 1 puff by mouth four times a day MAX OF 6 PUFFS IN 24 HOURS  0    diazepam (VALIUM) 10 mg tablet Take 1 tablet (10 mg total) by mouth every 8 (eight) hours as needed for anxiety 30 tablet 0    Magnesium 500 MG TABS Take 1 tablet by mouth daily      omeprazole (PRILOSEC) 20 mg delayed release capsule Take 1 capsule (20 mg total) by mouth 2 (two) times a day 180 capsule 3    tiotropium-olodaterol (STIOLTO RESPIMAT) 2 5-2 5 MCG/ACT inhaler Inhale 1 puff daily 3 Inhaler 3    venlafaxine (EFFEXOR-XR) 75 mg 24 hr capsule Take 3 capsules (225 mg total) by mouth daily 90 capsule 0    diazepam (VALIUM) 5 mg tablet Take 1 tablet (5 mg total) by mouth 3 (three) times a day as needed for anxiety 90 tablet 0    HYDROcodone-acetaminophen (NORCO) 5-325 mg per tablet Take 1 tablet by mouth every 6 (six) hours as needed for pain Max Daily Amount: 4 tablets 90 tablet 0     No current facility-administered medications for this visit  Current Outpatient Prescriptions on File Prior to Visit   Medication Sig    atenolol (TENORMIN) 50 mg tablet Take 1 tablet (50 mg total) by mouth daily    azelastine (ASTELIN) 0 1 % nasal spray 1 spray into each nostril 2 (two) times a day Use in each nostril as directed    Calcium Carbonate-Vitamin D3 (CALCIUM 600/VITAMIN D) 600-400 MG-UNIT TABS Take 1 tablet by mouth 2 (two) times a day    COMBIVENT RESPIMAT inhaler inhale 1 puff by mouth four times a day MAX OF 6 PUFFS IN 24 HOURS    diazepam (VALIUM) 10 mg tablet Take 1 tablet (10 mg total) by mouth every 8 (eight) hours as needed for anxiety    Magnesium 500 MG TABS Take 1 tablet by mouth daily    venlafaxine (EFFEXOR-XR) 75 mg 24 hr capsule Take 3 capsules (225 mg total) by mouth daily    [DISCONTINUED] HYDROcodone-acetaminophen (NORCO)  mg per tablet Take 1 tablet by mouth every 4 (four) hours as needed for moderate pain Max Daily Amount: 6 tablets    [DISCONTINUED] omeprazole (PRILOSEC) 20 mg delayed release capsule Take 1 capsule (20 mg total) by mouth 2 (two) times a day    [DISCONTINUED] Tiotropium Bromide-Olodaterol (STIOLTO RESPIMAT) 2 5-2 5 MCG/ACT AERS Inhale 1 puff daily    [DISCONTINUED] diazepam (VALIUM) 5 mg tablet Take 1 tablet (5 mg total) by mouth 3 (three) times a day as needed for anxiety     No current facility-administered medications on file prior to visit  She has No Known Allergies       Review of Systems   All other systems reviewed and are negative          Objective:      /82 (BP Location: Left arm, Patient Position: Sitting, Cuff Size: Large)   Pulse 72   Resp 14   Ht 5' 4" (6 335 m)   Wt 67 4 kg (148 lb 9 6 oz)   SpO2 97%   BMI 25 51 kg/m²          Physical Exam   Constitutional: She is oriented to person, place, and time  She appears well-developed and well-nourished  Neck: Normal range of motion  Neck supple  Cardiovascular: Normal rate, regular rhythm, normal heart sounds and intact distal pulses  Pulmonary/Chest: Effort normal and breath sounds normal    Abdominal: Soft  Bowel sounds are normal    Musculoskeletal: Normal range of motion  Neurological: She is alert and oriented to person, place, and time  She has normal reflexes  Skin: Skin is warm and dry  Psychiatric: She has a normal mood and affect  Her behavior is normal  Judgment and thought content normal    Nursing note and vitals reviewed

## 2019-01-21 ENCOUNTER — TELEPHONE (OUTPATIENT)
Dept: OBGYN CLINIC | Facility: HOSPITAL | Age: 74
End: 2019-01-21

## 2019-01-21 NOTE — TELEPHONE ENCOUNTER
Dr Crane Level    Patient is calling to cx her appt as she is no longer having pain  She also is not going to have the labs done as they were being done due to the pain she was having

## 2019-01-22 ENCOUNTER — TELEPHONE (OUTPATIENT)
Dept: FAMILY MEDICINE CLINIC | Facility: CLINIC | Age: 74
End: 2019-01-22

## 2019-02-04 DIAGNOSIS — Z71.89 COMPLEX CARE COORDINATION: Primary | ICD-10-CM

## 2019-02-12 ENCOUNTER — PATIENT OUTREACH (OUTPATIENT)
Dept: FAMILY MEDICINE CLINIC | Facility: CLINIC | Age: 74
End: 2019-02-12

## 2019-02-12 NOTE — PROGRESS NOTES
CM called patient to introduce myself and the  Care Management Program [facility] assessment completion   No answer,left message to return call  Attempt #1

## 2019-02-13 ENCOUNTER — TELEPHONE (OUTPATIENT)
Dept: FAMILY MEDICINE CLINIC | Facility: CLINIC | Age: 74
End: 2019-02-13

## 2019-02-13 DIAGNOSIS — G89.4 CHRONIC PAIN SYNDROME: ICD-10-CM

## 2019-02-14 RX ORDER — HYDROCODONE BITARTRATE AND ACETAMINOPHEN 5; 325 MG/1; MG/1
1 TABLET ORAL EVERY 6 HOURS PRN
Qty: 90 TABLET | Refills: 0 | Status: SHIPPED | OUTPATIENT
Start: 2019-02-14 | End: 2019-03-07 | Stop reason: SDUPTHER

## 2019-02-18 ENCOUNTER — PATIENT OUTREACH (OUTPATIENT)
Dept: FAMILY MEDICINE CLINIC | Facility: CLINIC | Age: 74
End: 2019-02-18

## 2019-02-18 NOTE — PATIENT INSTRUCTIONS
Low-Sodium Diet   AMBULATORY CARE:   A low-sodium diet  limits foods that are high in sodium (salt)  You will need to follow a low-sodium diet if you have high blood pressure, kidney disease, or heart failure  You may also need to follow this diet if you have a condition that is causing your body to retain (hold) extra fluid  You may need to limit the amount of sodium you eat to 1,500 mg  Ask your healthcare provider how much sodium you can have each day  How to use food labels to choose foods that are low in sodium:  Read food labels to find the amount of sodium they contain  The amount of sodium is listed in milligrams (mg)  The % Daily Value (DV) column tells you how much of your daily needs are met by 1 serving of the food for each nutrient listed  Choose foods that have less than 5% of the DV of sodium  These foods are considered low in sodium  Foods that have 20% or more of the DV of sodium are considered high in sodium  Some food labels may also list any of the following terms that tell you about the sodium content in the food:  · Sodium-free:  Less than 5 mg in each serving    · Very low sodium:  35 mg of sodium or less in each serving    · Low sodium:  140 mg of sodium or less in each serving    · Reduced sodium: At least 25% less sodium in each serving than the regular type    · Light in sodium:  50% less sodium in each serving    · Unsalted or no added salt:  No extra salt is added during processing (the food may still contain sodium)  Foods to avoid:  Salty foods are high in sodium   You should avoid the following:  · Processed foods:      ¨ Mixes for cornbread, biscuits, cake, and pudding     ¨ Instant foods, such as potatoes, cereals, noodles, and rice     ¨ Packaged foods, such as bread stuffing, rice and pasta mixes, snack dip mixes, and macaroni and cheese     ¨ Canned foods, such as canned vegetables, soups, broths, sauces, and vegetable or tomato juice    ¨ Snack foods, such as salted chips, popcorn, pretzels, pork rinds, salted crackers, and salted nuts    ¨ Frozen foods, such as dinners, entrees, vegetables with sauces, and breaded meats    ¨ Sauerkraut, pickled vegetables, and other foods prepared in brine    · Meats and cheeses:      ¨ Smoked or cured meat, such as corned beef, jennings, ham, hot dogs, and sausage    ¨ Canned meats or spreads, such as potted meats, sardines, anchovies, and imitation seafood    ¨ Deli or lunch meats, such as bologna, ham, turkey, and roast beef    ¨ Processed cheese, such as American cheese and cheese spreads    · Condiments, sauces, and seasonings:      ¨ Salt (¼ teaspoon of salt contains 575 mg of sodium)    ¨ Seasonings made with salt, such as garlic salt, celery salt, onion salt, and seasoned salt    ¨ Regular soy sauce, barbecue sauce, teriyaki sauce, steak sauce, Worcestershire sauce, and most flavored vinegars    ¨ Canned gravy and mixes     ¨ Regular condiments, such as mustard, ketchup, and salad dressings    ¨ Pickles and olives    ¨ Meat tenderizers and monosodium glutamate (MSG)  Foods to include:  Read the food label to find the exact amount of sodium in each serving  · Bread and cereal:  Try to choose breads with less than 80 mg of sodium per serving  ¨ Bread, roll, maryanne, tortilla, or unsalted crackers  ¨ Ready-to-eat cereals with less than 5% DV of sodium (examples include shredded wheat and puffed rice)    ¨ Pasta    · Vegetables and fruits:      ¨ Unsalted fresh, frozen, or canned vegetables    ¨ Fresh, frozen, or canned fruits    ¨ Fruit juice    · Dairy:  One serving has about 150 mg of sodium  ¨ Milk, all types    ¨ Yogurt    ¨ Hard cheese, such as cheddar, Swiss, [employer], or mozzarella    · Meat and other protein foods:  Some raw meats may have added sodium       ¨ Plain meats, fish, and poultry     ¨ Eggs    · Other foods:      ¨ Homemade pudding    ¨ Unsalted nuts, popcorn, or pretzels    ¨ Unsalted butter or margarine  Ways to decrease sodium:   · Add spices and herbs to foods instead of salt during cooking  Use salt-free seasonings to add flavor to foods  Examples include onion powder, garlic powder, basil, leiva powder, paprika, and parsley  Try lemon or lime juice or vinegar to give foods a tart flavor  Use hot peppers, pepper, or cayenne pepper to add a spicy flavor to foods  · Do not keep a salt shaker at your kitchen table  This may help keep you from adding salt to food at the table  It may take time to get used to enjoying the natural flavor of food instead of adding salt  Talk to your healthcare provider before you use salt substitutes  Some salt substitutes have a high amount of potassium and need to be avoided if you have kidney disease  · Choose low-sodium foods at restaurants  Meals from restaurants are often high in sodium  Some restaurants have nutrition information on the menu that tells you the amount of sodium in their foods  If possible, ask for your food to be prepared with less, or no salt  · Shop for unsalted or low-sodium foods and snacks at the grocery store  Examples include unsalted or low-sodium broths, soups, and canned vegetables  Choose fresh or frozen vegetables instead  Choose unsalted nuts or seeds or fresh fruits or vegetables as snacks  Read food labels and choose salt-free, very low-sodium, or low-sodium foods  © 2017 [address] [employer] Information is for End User's use only and may not be sold, redistributed or otherwise used for commercial purposes  All illustrations and images included in CareNotes® are the copyrighted property of A D A M [employer] Inc  or Jay [employer]  The above information is an  only  It is not intended as medical advice for individual conditions or treatments  Talk to your doctor, nurse or pharmacist before following any medical regimen to see if it is safe and effective for you

## 2019-02-18 NOTE — LETTER
Outpatient Care Management  [address], [address]  [phone]  [employer]        02/18/19    [address] [id number]    Dear Esther Giron,    [address] and [address] are committed to your health and satisfaction  We spoke recently about ways to meet your goals in self-management of your chronic illnesses  I have enclosed a copy of the plan of care we discussed  Please contact me directly at [phone]  Monday through Wednesday  from 8am to 4:30pm     Sincerely,    [address] may refer collectively to [address] [place] [address] , and [facility], unless otherwise noted

## 2019-02-18 NOTE — PROGRESS NOTES
CM called patient to introduce myself and the  Care Management Program    Annual adult([facility]) assessment completed

## 2019-02-19 ENCOUNTER — PATIENT OUTREACH (OUTPATIENT)
Dept: FAMILY MEDICINE CLINIC | Facility: CLINIC | Age: 74
End: 2019-02-19

## 2019-02-19 NOTE — PROGRESS NOTES
Called [facility] and spoke with [place]  Pt currently not eligible but she will send application in mail   therefore  When pt is reinstated with MA she can complete it and mail back

## 2019-02-21 DIAGNOSIS — F41.9 ANXIETY: ICD-10-CM

## 2019-02-21 RX ORDER — DIAZEPAM 10 MG/1
10 TABLET ORAL EVERY 8 HOURS PRN
Qty: 30 TABLET | Refills: 0 | Status: SHIPPED | OUTPATIENT
Start: 2019-02-21 | End: 2019-04-05 | Stop reason: SDUPTHER

## 2019-02-25 ENCOUNTER — TELEPHONE (OUTPATIENT)
Dept: OBGYN CLINIC | Facility: CLINIC | Age: 74
End: 2019-02-25

## 2019-02-25 NOTE — TELEPHONE ENCOUNTER
Patient  [phone]  Dr Vlad Sahu    Patient called in stating that she has a new insurance company  Due to her having new insurance she needs an updated script for her shoe  She said she needs an Heel lift for her rt shoe  Please have someone fax new script to [phone] thank you        Patient's new insurance is on file

## 2019-02-26 ENCOUNTER — PATIENT OUTREACH (OUTPATIENT)
Dept: FAMILY MEDICINE CLINIC | Facility: CLINIC | Age: 74
End: 2019-02-26

## 2019-02-26 DIAGNOSIS — M21.70 LEG LENGTH DISCREPANCY: Primary | ICD-10-CM

## 2019-03-07 DIAGNOSIS — G89.4 CHRONIC PAIN SYNDROME: ICD-10-CM

## 2019-03-07 RX ORDER — HYDROCODONE BITARTRATE AND ACETAMINOPHEN 5; 325 MG/1; MG/1
1 TABLET ORAL EVERY 6 HOURS PRN
Qty: 90 TABLET | Refills: 0 | Status: SHIPPED | OUTPATIENT
Start: 2019-03-07 | End: 2019-03-28 | Stop reason: SDUPTHER

## 2019-03-08 DIAGNOSIS — F41.9 ANXIETY: ICD-10-CM

## 2019-03-08 RX ORDER — DIAZEPAM 5 MG/1
5 TABLET ORAL 3 TIMES DAILY PRN
Qty: 90 TABLET | Refills: 5 | Status: SHIPPED | OUTPATIENT
Start: 2019-03-08 | End: 2019-04-19 | Stop reason: SDUPTHER

## 2019-03-28 DIAGNOSIS — G89.4 CHRONIC PAIN SYNDROME: ICD-10-CM

## 2019-03-28 RX ORDER — HYDROCODONE BITARTRATE AND ACETAMINOPHEN 5; 325 MG/1; MG/1
1 TABLET ORAL EVERY 6 HOURS PRN
Qty: 90 TABLET | Refills: 0 | Status: SHIPPED | OUTPATIENT
Start: 2019-03-28 | End: 2019-04-19 | Stop reason: SDUPTHER

## 2019-04-05 DIAGNOSIS — F41.9 ANXIETY: ICD-10-CM

## 2019-04-05 RX ORDER — DIAZEPAM 10 MG/1
10 TABLET ORAL EVERY 8 HOURS PRN
Qty: 30 TABLET | Refills: 0 | Status: SHIPPED | OUTPATIENT
Start: 2019-04-05 | End: 2019-05-10

## 2019-04-19 DIAGNOSIS — G89.4 CHRONIC PAIN SYNDROME: ICD-10-CM

## 2019-04-19 DIAGNOSIS — F41.9 ANXIETY: ICD-10-CM

## 2019-04-19 RX ORDER — HYDROCODONE BITARTRATE AND ACETAMINOPHEN 5; 325 MG/1; MG/1
1 TABLET ORAL EVERY 6 HOURS PRN
Qty: 90 TABLET | Refills: 0 | Status: SHIPPED | OUTPATIENT
Start: 2019-04-19 | End: 2019-05-10 | Stop reason: SDUPTHER

## 2019-04-19 RX ORDER — DIAZEPAM 5 MG/1
5 TABLET ORAL 3 TIMES DAILY PRN
Qty: 90 TABLET | Refills: 0 | Status: SHIPPED | OUTPATIENT
Start: 2019-04-19 | End: 2019-05-17 | Stop reason: SDUPTHER

## 2019-04-28 DIAGNOSIS — F32.A DEPRESSION, UNSPECIFIED DEPRESSION TYPE: ICD-10-CM

## 2019-04-29 RX ORDER — VENLAFAXINE HYDROCHLORIDE 75 MG/1
CAPSULE, EXTENDED RELEASE ORAL
Qty: 90 CAPSULE | Refills: 5 | Status: SHIPPED | OUTPATIENT
Start: 2019-04-29 | End: 2019-05-01 | Stop reason: SDUPTHER

## 2019-05-01 DIAGNOSIS — F32.A DEPRESSION, UNSPECIFIED DEPRESSION TYPE: ICD-10-CM

## 2019-05-01 RX ORDER — VENLAFAXINE HYDROCHLORIDE 75 MG/1
75 CAPSULE, EXTENDED RELEASE ORAL DAILY
Qty: 270 CAPSULE | Refills: 3 | Status: SHIPPED | OUTPATIENT
Start: 2019-05-01 | End: 2019-08-26

## 2019-05-10 ENCOUNTER — OFFICE VISIT (OUTPATIENT)
Dept: FAMILY MEDICINE CLINIC | Facility: CLINIC | Age: 74
End: 2019-05-10
Payer: COMMERCIAL

## 2019-05-10 VITALS
HEIGHT: 64 IN | HEART RATE: 66 BPM | WEIGHT: 153.6 LBS | DIASTOLIC BLOOD PRESSURE: 74 MMHG | BODY MASS INDEX: 26.22 KG/M2 | OXYGEN SATURATION: 95 % | RESPIRATION RATE: 16 BRPM | SYSTOLIC BLOOD PRESSURE: 130 MMHG

## 2019-05-10 DIAGNOSIS — Z00.00 MEDICARE ANNUAL WELLNESS VISIT, SUBSEQUENT: Primary | ICD-10-CM

## 2019-05-10 DIAGNOSIS — F17.200 TOBACCO DEPENDENCY: ICD-10-CM

## 2019-05-10 DIAGNOSIS — G89.4 CHRONIC PAIN SYNDROME: ICD-10-CM

## 2019-05-10 DIAGNOSIS — F17.200 SMOKER: ICD-10-CM

## 2019-05-10 DIAGNOSIS — Z12.39 BREAST CANCER SCREENING: ICD-10-CM

## 2019-05-10 PROCEDURE — 4004F PT TOBACCO SCREEN RCVD TLK: CPT | Performed by: FAMILY MEDICINE

## 2019-05-10 PROCEDURE — 1160F RVW MEDS BY RX/DR IN RCRD: CPT | Performed by: FAMILY MEDICINE

## 2019-05-10 PROCEDURE — 3008F BODY MASS INDEX DOCD: CPT | Performed by: FAMILY MEDICINE

## 2019-05-10 PROCEDURE — 1125F AMNT PAIN NOTED PAIN PRSNT: CPT | Performed by: FAMILY MEDICINE

## 2019-05-10 PROCEDURE — G0439 PPPS, SUBSEQ VISIT: HCPCS | Performed by: FAMILY MEDICINE

## 2019-05-10 PROCEDURE — 1170F FXNL STATUS ASSESSED: CPT | Performed by: FAMILY MEDICINE

## 2019-05-10 RX ORDER — HYDROCODONE BITARTRATE AND ACETAMINOPHEN 5; 325 MG/1; MG/1
1 TABLET ORAL EVERY 6 HOURS PRN
Qty: 90 TABLET | Refills: 0 | Status: SHIPPED | OUTPATIENT
Start: 2019-05-10 | End: 2019-05-30 | Stop reason: SDUPTHER

## 2019-05-17 DIAGNOSIS — F41.9 ANXIETY: ICD-10-CM

## 2019-05-17 RX ORDER — DIAZEPAM 5 MG/1
5 TABLET ORAL 3 TIMES DAILY PRN
Qty: 90 TABLET | Refills: 5 | Status: SHIPPED | OUTPATIENT
Start: 2019-05-17 | End: 2019-11-19 | Stop reason: SDUPTHER

## 2019-05-30 DIAGNOSIS — G89.4 CHRONIC PAIN SYNDROME: ICD-10-CM

## 2019-05-30 RX ORDER — HYDROCODONE BITARTRATE AND ACETAMINOPHEN 5; 325 MG/1; MG/1
1 TABLET ORAL EVERY 6 HOURS PRN
Qty: 90 TABLET | Refills: 0 | Status: SHIPPED | OUTPATIENT
Start: 2019-05-30 | End: 2019-06-20 | Stop reason: SDUPTHER

## 2019-06-01 DIAGNOSIS — Z12.39 BREAST SCREENING: ICD-10-CM

## 2019-06-20 DIAGNOSIS — G89.4 CHRONIC PAIN SYNDROME: ICD-10-CM

## 2019-06-20 RX ORDER — HYDROCODONE BITARTRATE AND ACETAMINOPHEN 5; 325 MG/1; MG/1
1 TABLET ORAL EVERY 6 HOURS PRN
Qty: 90 TABLET | Refills: 0 | Status: SHIPPED | OUTPATIENT
Start: 2019-06-20 | End: 2019-07-10 | Stop reason: SDUPTHER

## 2019-07-10 DIAGNOSIS — G89.4 CHRONIC PAIN SYNDROME: ICD-10-CM

## 2019-07-10 RX ORDER — HYDROCODONE BITARTRATE AND ACETAMINOPHEN 5; 325 MG/1; MG/1
1 TABLET ORAL EVERY 6 HOURS PRN
Qty: 90 TABLET | Refills: 0 | Status: SHIPPED | OUTPATIENT
Start: 2019-07-10 | End: 2019-08-02 | Stop reason: SDUPTHER

## 2019-08-02 DIAGNOSIS — G89.4 CHRONIC PAIN SYNDROME: ICD-10-CM

## 2019-08-02 RX ORDER — HYDROCODONE BITARTRATE AND ACETAMINOPHEN 5; 325 MG/1; MG/1
1 TABLET ORAL EVERY 6 HOURS PRN
Qty: 90 TABLET | Refills: 0 | Status: SHIPPED | OUTPATIENT
Start: 2019-08-02 | End: 2019-08-16

## 2019-08-12 DIAGNOSIS — I10 ESSENTIAL HYPERTENSION: ICD-10-CM

## 2019-08-12 RX ORDER — ATENOLOL 50 MG/1
50 TABLET ORAL DAILY
Qty: 90 TABLET | Refills: 3 | Status: SHIPPED | OUTPATIENT
Start: 2019-08-12 | End: 2020-08-11

## 2019-08-16 ENCOUNTER — OFFICE VISIT (OUTPATIENT)
Dept: FAMILY MEDICINE CLINIC | Facility: CLINIC | Age: 74
End: 2019-08-16
Payer: COMMERCIAL

## 2019-08-16 VITALS
SYSTOLIC BLOOD PRESSURE: 130 MMHG | WEIGHT: 156 LBS | RESPIRATION RATE: 16 BRPM | OXYGEN SATURATION: 95 % | BODY MASS INDEX: 26.63 KG/M2 | DIASTOLIC BLOOD PRESSURE: 78 MMHG | HEART RATE: 63 BPM | HEIGHT: 64 IN

## 2019-08-16 DIAGNOSIS — M46.1 INFLAMMATION OF SACROILIAC JOINT (HCC): ICD-10-CM

## 2019-08-16 DIAGNOSIS — E78.2 MIXED HYPERLIPIDEMIA: ICD-10-CM

## 2019-08-16 DIAGNOSIS — H53.9 VISION CHANGES: ICD-10-CM

## 2019-08-16 DIAGNOSIS — I10 ESSENTIAL HYPERTENSION: Primary | ICD-10-CM

## 2019-08-16 DIAGNOSIS — M85.80 OSTEOPENIA, UNSPECIFIED LOCATION: ICD-10-CM

## 2019-08-16 DIAGNOSIS — F17.200 SMOKER: ICD-10-CM

## 2019-08-16 DIAGNOSIS — E78.49 OTHER HYPERLIPIDEMIA: ICD-10-CM

## 2019-08-16 DIAGNOSIS — G89.4 CHRONIC PAIN SYNDROME: ICD-10-CM

## 2019-08-16 PROCEDURE — 1160F RVW MEDS BY RX/DR IN RCRD: CPT | Performed by: FAMILY MEDICINE

## 2019-08-16 PROCEDURE — 3008F BODY MASS INDEX DOCD: CPT | Performed by: FAMILY MEDICINE

## 2019-08-16 PROCEDURE — 3075F SYST BP GE 130 - 139MM HG: CPT | Performed by: FAMILY MEDICINE

## 2019-08-16 PROCEDURE — 4004F PT TOBACCO SCREEN RCVD TLK: CPT | Performed by: FAMILY MEDICINE

## 2019-08-16 PROCEDURE — 99214 OFFICE O/P EST MOD 30 MIN: CPT | Performed by: FAMILY MEDICINE

## 2019-08-16 RX ORDER — HYDROCODONE BITARTRATE AND ACETAMINOPHEN 10; 325 MG/1; MG/1
1 TABLET ORAL EVERY 6 HOURS PRN
Qty: 120 TABLET | Refills: 0 | Status: SHIPPED | OUTPATIENT
Start: 2019-08-16 | End: 2019-09-27 | Stop reason: SDUPTHER

## 2019-08-16 RX ORDER — VARENICLINE TARTRATE 25 MG
KIT ORAL
Qty: 53 TABLET | Refills: 0 | Status: SHIPPED | OUTPATIENT
Start: 2019-08-16 | End: 2019-11-19

## 2019-08-16 RX ORDER — NICOTINE 21 MG/24HR
1 PATCH, TRANSDERMAL 24 HOURS TRANSDERMAL EVERY 24 HOURS
Qty: 28 PATCH | Refills: 5 | Status: SHIPPED | OUTPATIENT
Start: 2019-08-16 | End: 2019-11-19

## 2019-08-16 NOTE — PROGRESS NOTES
Assessment/Plan:    No problem-specific Assessment & Plan notes found for this encounter  Diagnoses and all orders for this visit:    Essential hypertension    Inflammation of sacroiliac joint (HCC)    Other hyperlipidemia    Mixed hyperlipidemia    Chronic pain syndrome  -     HYDROcodone-acetaminophen (NORCO)  mg per tablet; Take 1 tablet by mouth every 6 (six) hours as needed for moderate painMax Daily Amount: 4 tablets    Smoker  -     varenicline (CHANTIX AGUSTINA) 0 5 MG X 11 & 1 MG X 42 tablet; Take one 0 5mg tab by mouth 1x daily for 3 days, then increase to one 0 5mg tab 2x daily for 3 days, then increase to one 1mg tab 2x daily  -     nicotine (NICODERM CQ) 21 mg/24 hr TD 24 hr patch; Place 1 patch on the skin every 24 hours    Vision changes  -     Ambulatory referral to Ophthalmology; Future    Osteopenia, unspecified location  -     DXA bone density spine hip and pelvis; Future          Subjective:      Patient ID: Silvino Leonard is a 69 y o  female  She has chronic lumbosacral spine pain  She has been on hydrocodone for 20 years  She has good pain relief with no side effects  She is aware of the risks and benefits of long-term opiate based pain medication  She and I are both in agreement that in her case the benefits outweigh the risks  She has assigned treatment agreement in the chart and urine toxicology consistent with regular use of her prescribed medications  We tapered her down to 5 mg of Vicodin few months ago  She is having increased pain over the last month and asks for an increased dose of her pain medication  I think this is more than appropriate  She has tried a number of different over-the-counter anti-inflammatory medications including:  Naprosyn, Celebrex, Mobic, Voltaren, plus muscle relaxers a various sorts  She has completed physical therapy  She had multiple injections in her back and is not interested in going down this road again      Her blood pressure is well controlled on her current regimen  She has no chest pain or shortness of breath  She has no headache or vision changes  She is a smoker  She is interested in quitting  She is unsure if she wants to try the patch or Chantix  We will provide her prescription for both  The following portions of the patient's history were reviewed and updated as appropriate:   She  has a past medical history of Elevated liver enzymes, Fracture of rib, HPV (human papilloma virus) infection, Osteoporosis, Pulmonary nodule, and Thyroid nodule  She   Patient Active Problem List    Diagnosis Date Noted    BMI 26 0-26 9,adult 05/10/2019    Smoker 05/10/2019    Medicare annual wellness visit, subsequent 05/10/2019    Chronic groin pain, right 12/19/2018    Thyroid nodule 10/16/2018    Chronic pain of right knee 10/16/2018    Pulmonary nodules 09/11/2018    Cytologic evidence of malignancy on smear of cervix 09/11/2018    Asymptomatic microscopic hematuria 06/11/2018    Rash of face 06/11/2018    Essential hypertension 03/22/2018    Other hyperlipidemia 03/22/2018    Screening for genitourinary condition 03/22/2018    Screening for neurological condition 03/22/2018    COPD with acute exacerbation ([address] ) 12/13/2017    Hyperlipidemia 08/31/2015    Hypertension 08/31/2015    Inflammation of sacroiliac joint ([address] ) 08/29/2015    Lumbar disc disease 08/18/2014    HTN, goal below 140/90 08/24/2011     She  has a past surgical history that includes Appendectomy; Back surgery; Cholecystectomy; Foot surgery; Total hip arthroplasty (Right); Total abdominal hysterectomy; Colposcopy; Colonoscopy w/ biopsies; and US guided thyroid biopsy  Her family history includes Diabetes type II in her mother; Heart disease in her father and mother  She  reports that she has been smoking  She has never used smokeless tobacco  She reports that she does not drink alcohol or use drugs    Current Outpatient Medications   Medication Sig Dispense Refill    atenolol (TENORMIN) 50 mg tablet Take 1 tablet (50 mg total) by mouth daily 90 tablet 3    azelastine (ASTELIN) 0 1 % nasal spray 1 spray into each nostril 2 (two) times a day Use in each nostril as directed 30 mL 5    COMBIVENT RESPIMAT inhaler inhale 1 puff by mouth four times a day MAX OF 6 PUFFS IN 24 HOURS  0    diazepam (VALIUM) 5 mg tablet Take 1 tablet (5 mg total) by mouth 3 (three) times a day as needed for anxiety 90 tablet 5    HYDROcodone-acetaminophen (NORCO)  mg per tablet Take 1 tablet by mouth every 6 (six) hours as needed for moderate painMax Daily Amount: 4 tablets 120 tablet 0    Magnesium 500 MG TABS Take 1 tablet by mouth daily      nicotine (NICODERM CQ) 21 mg/24 hr TD 24 hr patch Place 1 patch on the skin every 24 hours 28 patch 5    tiotropium-olodaterol (STIOLTO RESPIMAT) 2 5-2 5 MCG/ACT inhaler Inhale 1 puff daily 3 Inhaler 3    varenicline (CHANTIX AGUSTINA) 0 5 MG X 11 & 1 MG X 42 tablet Take one 0 5mg tab by mouth 1x daily for 3 days, then increase to one 0 5mg tab 2x daily for 3 days, then increase to one 1mg tab 2x daily 53 tablet 0    venlafaxine (EFFEXOR-XR) 75 mg 24 hr capsule Take 1 capsule (75 mg total) by mouth daily 270 capsule 3     No current facility-administered medications for this visit        Current Outpatient Medications on File Prior to Visit   Medication Sig    atenolol (TENORMIN) 50 mg tablet Take 1 tablet (50 mg total) by mouth daily    azelastine (ASTELIN) 0 1 % nasal spray 1 spray into each nostril 2 (two) times a day Use in each nostril as directed    COMBIVENT RESPIMAT inhaler inhale 1 puff by mouth four times a day MAX OF 6 PUFFS IN 24 HOURS    diazepam (VALIUM) 5 mg tablet Take 1 tablet (5 mg total) by mouth 3 (three) times a day as needed for anxiety    Magnesium 500 MG TABS Take 1 tablet by mouth daily    tiotropium-olodaterol (STIOLTO RESPIMAT) 2 5-2 5 MCG/ACT inhaler Inhale 1 puff daily    venlafaxine (EFFEXOR-XR) 75 mg 24 hr capsule Take 1 capsule (75 mg total) by mouth daily    [DISCONTINUED] HYDROcodone-acetaminophen (NORCO) 5-325 mg per tablet Take 1 tablet by mouth every 6 (six) hours as needed for painMax Daily Amount: 4 tablets     No current facility-administered medications on file prior to visit  She has No Known Allergies       Review of Systems   All other systems reviewed and are negative  Objective:      /78   Pulse 63   Resp 16   Ht 5' 4" (1 626 m)   Wt 70 8 kg (156 lb)   SpO2 95%   BMI 26 78 kg/m²          Physical Exam   Constitutional: She is oriented to person, place, and time  She appears well-developed and well-nourished  Neck: Normal range of motion  Neck supple  Cardiovascular: Normal rate, regular rhythm, normal heart sounds and intact distal pulses  Pulmonary/Chest: Effort normal and breath sounds normal    Abdominal: Soft  Bowel sounds are normal    Musculoskeletal: Normal range of motion  Neurological: She is alert and oriented to person, place, and time  Skin: Skin is warm and dry  Capillary refill takes less than 2 seconds  Psychiatric: She has a normal mood and affect  Her behavior is normal  Judgment and thought content normal    Nursing note and vitals reviewed

## 2019-08-23 ENCOUNTER — TELEPHONE (OUTPATIENT)
Dept: FAMILY MEDICINE CLINIC | Facility: CLINIC | Age: 74
End: 2019-08-23

## 2019-08-23 DIAGNOSIS — F32.A DEPRESSION, UNSPECIFIED DEPRESSION TYPE: Primary | ICD-10-CM

## 2019-08-26 ENCOUNTER — TELEPHONE (OUTPATIENT)
Dept: FAMILY MEDICINE CLINIC | Facility: CLINIC | Age: 74
End: 2019-08-26

## 2019-08-26 DIAGNOSIS — F41.9 ANXIETY: Primary | ICD-10-CM

## 2019-08-26 RX ORDER — VENLAFAXINE HYDROCHLORIDE 150 MG/1
150 TABLET, EXTENDED RELEASE ORAL
Qty: 90 TABLET | Refills: 3 | Status: SHIPPED | OUTPATIENT
Start: 2019-08-26 | End: 2019-08-26

## 2019-08-26 RX ORDER — VENLAFAXINE HYDROCHLORIDE 75 MG/1
75 TABLET, EXTENDED RELEASE ORAL
Qty: 30 TABLET | Refills: 5 | Status: SHIPPED | OUTPATIENT
Start: 2019-08-26 | End: 2020-02-21 | Stop reason: SDUPTHER

## 2019-08-26 NOTE — TELEPHONE ENCOUNTER
Pt needs an alternative, she just called (8/26), said shes "scared", she got very sick the other day, because shes now  out

## 2019-09-27 DIAGNOSIS — G89.4 CHRONIC PAIN SYNDROME: ICD-10-CM

## 2019-09-27 RX ORDER — HYDROCODONE BITARTRATE AND ACETAMINOPHEN 10; 325 MG/1; MG/1
1 TABLET ORAL EVERY 6 HOURS PRN
Qty: 120 TABLET | Refills: 0 | Status: SHIPPED | OUTPATIENT
Start: 2019-09-27 | End: 2019-11-05 | Stop reason: SDUPTHER

## 2019-10-08 DIAGNOSIS — F17.200 TOBACCO DEPENDENCY: Primary | ICD-10-CM

## 2019-10-08 RX ORDER — IPRATROPIUM/ALBUTEROL SULFATE 20-100 MCG
1 MIST INHALER (GRAM) INHALATION 4 TIMES DAILY
Qty: 4 G | Refills: 5 | Status: SHIPPED | OUTPATIENT
Start: 2019-10-08 | End: 2020-05-26 | Stop reason: SDUPTHER

## 2019-11-05 DIAGNOSIS — G89.4 CHRONIC PAIN SYNDROME: ICD-10-CM

## 2019-11-05 RX ORDER — HYDROCODONE BITARTRATE AND ACETAMINOPHEN 10; 325 MG/1; MG/1
1 TABLET ORAL EVERY 6 HOURS PRN
Qty: 120 TABLET | Refills: 0 | Status: SHIPPED | OUTPATIENT
Start: 2019-11-05 | End: 2019-12-11 | Stop reason: SDUPTHER

## 2019-11-19 ENCOUNTER — OFFICE VISIT (OUTPATIENT)
Dept: FAMILY MEDICINE CLINIC | Facility: CLINIC | Age: 74
End: 2019-11-19
Payer: COMMERCIAL

## 2019-11-19 VITALS
BODY MASS INDEX: 27.31 KG/M2 | DIASTOLIC BLOOD PRESSURE: 78 MMHG | WEIGHT: 160 LBS | OXYGEN SATURATION: 94 % | HEIGHT: 64 IN | SYSTOLIC BLOOD PRESSURE: 132 MMHG | HEART RATE: 71 BPM

## 2019-11-19 DIAGNOSIS — I10 ESSENTIAL HYPERTENSION: ICD-10-CM

## 2019-11-19 DIAGNOSIS — E04.1 THYROID NODULE: ICD-10-CM

## 2019-11-19 DIAGNOSIS — J44.1 COPD WITH ACUTE EXACERBATION (HCC): ICD-10-CM

## 2019-11-19 DIAGNOSIS — E78.49 OTHER HYPERLIPIDEMIA: ICD-10-CM

## 2019-11-19 DIAGNOSIS — I10 HTN, GOAL BELOW 140/90: ICD-10-CM

## 2019-11-19 DIAGNOSIS — Z23 NEED FOR IMMUNIZATION AGAINST INFLUENZA: Primary | ICD-10-CM

## 2019-11-19 DIAGNOSIS — D68.9 CLOTTING DISORDER (HCC): Primary | ICD-10-CM

## 2019-11-19 DIAGNOSIS — F41.9 ANXIETY: ICD-10-CM

## 2019-11-19 PROCEDURE — 1160F RVW MEDS BY RX/DR IN RCRD: CPT | Performed by: FAMILY MEDICINE

## 2019-11-19 PROCEDURE — 4004F PT TOBACCO SCREEN RCVD TLK: CPT | Performed by: FAMILY MEDICINE

## 2019-11-19 PROCEDURE — G0008 ADMIN INFLUENZA VIRUS VAC: HCPCS

## 2019-11-19 PROCEDURE — 90662 IIV NO PRSV INCREASED AG IM: CPT

## 2019-11-19 PROCEDURE — 99214 OFFICE O/P EST MOD 30 MIN: CPT | Performed by: FAMILY MEDICINE

## 2019-11-19 RX ORDER — VENLAFAXINE 75 MG/1
TABLET ORAL
Refills: 0 | COMMUNITY
Start: 2019-10-23 | End: 2020-02-21

## 2019-11-19 RX ORDER — DIAZEPAM 5 MG/1
5 TABLET ORAL 3 TIMES DAILY PRN
Qty: 90 TABLET | Refills: 5 | Status: SHIPPED | OUTPATIENT
Start: 2019-11-19 | End: 2020-06-03 | Stop reason: SDUPTHER

## 2019-11-19 RX ORDER — ERYTHROMYCIN 5 MG/G
OINTMENT OPHTHALMIC
Refills: 0 | COMMUNITY
Start: 2019-10-10 | End: 2021-08-04 | Stop reason: CLARIF

## 2019-11-19 NOTE — PROGRESS NOTES
Assessment/Plan:    No problem-specific Assessment & Plan notes found for this encounter  Diagnoses and all orders for this visit:    Clotting disorder ([facility] )  -     MTHFR mutation; Future  -     Lipid panel; Future  -     Comprehensive metabolic panel; Future  -     TSH, 3rd generation; Future    Thyroid nodule  -     Lipid panel; Future  -     Comprehensive metabolic panel; Future  -     TSH, 3rd generation; Future    COPD with acute exacerbation ([facility] )  -     Lipid panel; Future  -     Comprehensive metabolic panel; Future  -     TSH, 3rd generation; Future  -     Complete PFT with post bronchodilator; Future    Essential hypertension  -     Lipid panel; Future  -     Comprehensive metabolic panel; Future  -     TSH, 3rd generation; Future    HTN, goal below 140/90  -     Lipid panel; Future  -     Comprehensive metabolic panel; Future  -     TSH, 3rd generation; Future    Other hyperlipidemia  -     Lipid panel; Future  -     Comprehensive metabolic panel; Future  -     TSH, 3rd generation; Future    Other orders  -     erythromycin (ILOTYCIN) ophthalmic ointment; APPLY SMALL AMOUNT TO BILATERAL UPPER EYELIDS 4 TIMES DAILY  -     venlafaxine (EFFEXOR) 75 mg tablet          Subjective:      Patient ID: Madeleine Yo is a 69 y o  female  She is doing well overall  Her blood pressure is at goal on her current regimen  She has no chest pain or shortness of breath  She has no headache or vision changes  She has no side effects from her current regimen  She has presumed COPD but has not had pulmonary function testing  She has hyperinflation of the lungs and flattening of the diaphragms consistent with a diagnosis of COPD on chest x-ray  Currently she has no cough, wheezing, shortness of breath, or other complaints  She is on :triple therapy:"An inhaled corticosteroid, a LABA, and a LAMA  She has chronic low back pain for which she is taking Vicodin    Her pain is well controlled on her current regimen  She has no side effects  She has been through physical therapy and pain management  She has tried and failed Tylenol, multiple muscle relaxers, all available over-the-counter nonsteroidal anti-inflammatories, Mobic, Celebrex, and others  She has assigned treatment agreement in the chart and urine toxicology consistent with regular use of her prescribed medications  We have discussed the risks and benefits of long-term opiate based pain medication  She and I are both in agreement that in her case the benefits outweigh the risks  The following portions of the patient's history were reviewed and updated as appropriate:   She  has a past medical history of Elevated liver enzymes, Fracture of rib, HPV (human papilloma virus) infection, Osteoporosis, Pulmonary nodule, and Thyroid nodule  She   Patient Active Problem List    Diagnosis Date Noted    BMI 26 0-26 9,adult 05/10/2019    Smoker 05/10/2019    Medicare annual wellness visit, subsequent 05/10/2019    Chronic groin pain, right 12/19/2018    Thyroid nodule 10/16/2018    Chronic pain of right knee 10/16/2018    Pulmonary nodules 09/11/2018    Cytologic evidence of malignancy on smear of cervix 09/11/2018    Asymptomatic microscopic hematuria 06/11/2018    Rash of face 06/11/2018    Essential hypertension 03/22/2018    Other hyperlipidemia 03/22/2018    Screening for genitourinary condition 03/22/2018    Screening for neurological condition 03/22/2018    COPD with acute exacerbation ([address] ) 12/13/2017    Hyperlipidemia 08/31/2015    Hypertension 08/31/2015    Inflammation of sacroiliac joint ([facility] [address] ) 08/29/2015    Lumbar disc disease 08/18/2014    HTN, goal below 140/90 08/24/2011     She  has a past surgical history that includes Appendectomy; Back surgery; Cholecystectomy; Foot surgery; Total hip arthroplasty (Right); Total abdominal hysterectomy; Colposcopy; Colonoscopy w/ biopsies; and US guided thyroid biopsy    Her family history includes Diabetes type II in her mother; Heart disease in her father and mother  She  reports that she has been smoking  She has never used smokeless tobacco  She reports that she does not drink alcohol or use drugs  Current Outpatient Medications   Medication Sig Dispense Refill    atenolol (TENORMIN) 50 mg tablet Take 1 tablet (50 mg total) by mouth daily 90 tablet 3    azelastine (ASTELIN) 0 1 % nasal spray 1 spray into each nostril 2 (two) times a day Use in each nostril as directed 30 mL 5    COMBIVENT RESPIMAT inhaler Inhale 1 puff 4 (four) times a day 4 g 5    diazepam (VALIUM) 5 mg tablet Take 1 tablet (5 mg total) by mouth 3 (three) times a day as needed for anxiety 90 tablet 5    erythromycin (ILOTYCIN) ophthalmic ointment APPLY SMALL AMOUNT TO BILATERAL UPPER EYELIDS 4 TIMES DAILY  0    HYDROcodone-acetaminophen (NORCO)  mg per tablet Take 1 tablet by mouth every 6 (six) hours as needed for moderate painMax Daily Amount: 4 tablets 120 tablet 0    Magnesium 500 MG TABS Take 1 tablet by mouth daily      tiotropium-olodaterol (STIOLTO RESPIMAT) 2 5-2 5 MCG/ACT inhaler Inhale 1 puff daily 3 Inhaler 3    venlafaxine 75 mg 24 hr tablet Take 1 tablet (75 mg total) by mouth daily with breakfast 30 tablet 5    nicotine (NICODERM CQ) 21 mg/24 hr TD 24 hr patch Place 1 patch on the skin every 24 hours (Patient not taking: Reported on 11/19/2019) 28 patch 5    varenicline (CHANTIX AGUSTINA) 0 5 MG X 11 & 1 MG X 42 tablet Take one 0 5mg tab by mouth 1x daily for 3 days, then increase to one 0 5mg tab 2x daily for 3 days, then increase to one 1mg tab 2x daily (Patient not taking: Reported on 11/19/2019) 53 tablet 0    venlafaxine (EFFEXOR) 75 mg tablet   0     No current facility-administered medications for this visit        Current Outpatient Medications on File Prior to Visit   Medication Sig    atenolol (TENORMIN) 50 mg tablet Take 1 tablet (50 mg total) by mouth daily    azelastine (ASTELIN) 0 1 % nasal spray 1 spray into each nostril 2 (two) times a day Use in each nostril as directed    COMBIVENT RESPIMAT inhaler Inhale 1 puff 4 (four) times a day    diazepam (VALIUM) 5 mg tablet Take 1 tablet (5 mg total) by mouth 3 (three) times a day as needed for anxiety    erythromycin (ILOTYCIN) ophthalmic ointment APPLY SMALL AMOUNT TO BILATERAL UPPER EYELIDS 4 TIMES DAILY    HYDROcodone-acetaminophen (NORCO)  mg per tablet Take 1 tablet by mouth every 6 (six) hours as needed for moderate painMax Daily Amount: 4 tablets    Magnesium 500 MG TABS Take 1 tablet by mouth daily    tiotropium-olodaterol (STIOLTO RESPIMAT) 2 5-2 5 MCG/ACT inhaler Inhale 1 puff daily    venlafaxine 75 mg 24 hr tablet Take 1 tablet (75 mg total) by mouth daily with breakfast    nicotine (NICODERM CQ) 21 mg/24 hr TD 24 hr patch Place 1 patch on the skin every 24 hours (Patient not taking: Reported on 11/19/2019)    varenicline (CHANTIX AGUSTINA) 0 5 MG X 11 & 1 MG X 42 tablet Take one 0 5mg tab by mouth 1x daily for 3 days, then increase to one 0 5mg tab 2x daily for 3 days, then increase to one 1mg tab 2x daily (Patient not taking: Reported on 11/19/2019)    venlafaxine (EFFEXOR) 75 mg tablet      No current facility-administered medications on file prior to visit  She has No Known Allergies       Review of Systems      Objective:      /78   Pulse 71   Ht 5' 4" (1 626 m)   Wt 72 6 kg (160 lb)   SpO2 94%   BMI 27 46 kg/m²          Physical Exam

## 2019-12-11 DIAGNOSIS — G89.4 CHRONIC PAIN SYNDROME: ICD-10-CM

## 2019-12-11 RX ORDER — HYDROCODONE BITARTRATE AND ACETAMINOPHEN 10; 325 MG/1; MG/1
1 TABLET ORAL EVERY 6 HOURS PRN
Qty: 120 TABLET | Refills: 0 | Status: SHIPPED | OUTPATIENT
Start: 2019-12-11 | End: 2020-01-15 | Stop reason: SDUPTHER

## 2019-12-16 ENCOUNTER — PATIENT OUTREACH (OUTPATIENT)
Dept: FAMILY MEDICINE CLINIC | Facility: CLINIC | Age: 74
End: 2019-12-16

## 2020-01-02 ENCOUNTER — TELEPHONE (OUTPATIENT)
Dept: FAMILY MEDICINE CLINIC | Facility: CLINIC | Age: 75
End: 2020-01-02

## 2020-01-02 DIAGNOSIS — J01.00 ACUTE NON-RECURRENT MAXILLARY SINUSITIS: ICD-10-CM

## 2020-01-02 DIAGNOSIS — J31.0 CHRONIC RHINITIS: Primary | ICD-10-CM

## 2020-01-02 RX ORDER — FLUTICASONE PROPIONATE 50 MCG
2 SPRAY, SUSPENSION (ML) NASAL DAILY
Qty: 1 BOTTLE | Refills: 5 | Status: SHIPPED | OUTPATIENT
Start: 2020-01-02 | End: 2021-04-11

## 2020-01-02 RX ORDER — AZITHROMYCIN 250 MG/1
TABLET, FILM COATED ORAL
Qty: 6 TABLET | Refills: 0 | Status: SHIPPED | OUTPATIENT
Start: 2020-01-02 | End: 2020-01-09

## 2020-01-02 NOTE — TELEPHONE ENCOUNTER
Patient complaints of  Congestion and yellow mucous asking if something could be called in  Said grand daughter had the flu

## 2020-01-15 DIAGNOSIS — G89.4 CHRONIC PAIN SYNDROME: ICD-10-CM

## 2020-01-15 RX ORDER — HYDROCODONE BITARTRATE AND ACETAMINOPHEN 10; 325 MG/1; MG/1
1 TABLET ORAL EVERY 6 HOURS PRN
Qty: 120 TABLET | Refills: 0 | Status: SHIPPED | OUTPATIENT
Start: 2020-01-15 | End: 2020-02-24 | Stop reason: SDUPTHER

## 2020-02-21 ENCOUNTER — OFFICE VISIT (OUTPATIENT)
Dept: FAMILY MEDICINE CLINIC | Facility: CLINIC | Age: 75
End: 2020-02-21
Payer: COMMERCIAL

## 2020-02-21 VITALS
WEIGHT: 158 LBS | DIASTOLIC BLOOD PRESSURE: 78 MMHG | BODY MASS INDEX: 26.98 KG/M2 | OXYGEN SATURATION: 96 % | HEIGHT: 64 IN | HEART RATE: 69 BPM | SYSTOLIC BLOOD PRESSURE: 134 MMHG

## 2020-02-21 DIAGNOSIS — D68.9 CLOTTING DISORDER (HCC): ICD-10-CM

## 2020-02-21 DIAGNOSIS — F41.9 ANXIETY: ICD-10-CM

## 2020-02-21 DIAGNOSIS — M46.1 INFLAMMATION OF SACROILIAC JOINT (HCC): ICD-10-CM

## 2020-02-21 DIAGNOSIS — J44.1 COPD WITH ACUTE EXACERBATION (HCC): ICD-10-CM

## 2020-02-21 DIAGNOSIS — E78.2 MIXED HYPERLIPIDEMIA: ICD-10-CM

## 2020-02-21 DIAGNOSIS — R91.1 LUNG NODULE < 6CM ON CT: ICD-10-CM

## 2020-02-21 DIAGNOSIS — I10 ESSENTIAL HYPERTENSION: ICD-10-CM

## 2020-02-21 DIAGNOSIS — Z12.11 COLON CANCER SCREENING: Primary | ICD-10-CM

## 2020-02-21 DIAGNOSIS — G89.4 CHRONIC PAIN SYNDROME: ICD-10-CM

## 2020-02-21 PROCEDURE — 99214 OFFICE O/P EST MOD 30 MIN: CPT | Performed by: FAMILY MEDICINE

## 2020-02-21 PROCEDURE — 4040F PNEUMOC VAC/ADMIN/RCVD: CPT | Performed by: FAMILY MEDICINE

## 2020-02-21 PROCEDURE — 4004F PT TOBACCO SCREEN RCVD TLK: CPT | Performed by: FAMILY MEDICINE

## 2020-02-21 PROCEDURE — 3008F BODY MASS INDEX DOCD: CPT | Performed by: FAMILY MEDICINE

## 2020-02-21 PROCEDURE — 1160F RVW MEDS BY RX/DR IN RCRD: CPT | Performed by: FAMILY MEDICINE

## 2020-02-21 PROCEDURE — 3075F SYST BP GE 130 - 139MM HG: CPT | Performed by: FAMILY MEDICINE

## 2020-02-21 PROCEDURE — 3078F DIAST BP <80 MM HG: CPT | Performed by: FAMILY MEDICINE

## 2020-02-21 RX ORDER — VENLAFAXINE HYDROCHLORIDE 75 MG/1
75 TABLET, EXTENDED RELEASE ORAL
Qty: 30 TABLET | Refills: 5 | Status: SHIPPED | OUTPATIENT
Start: 2020-02-21 | End: 2020-08-21

## 2020-02-21 RX ORDER — ALBUTEROL SULFATE 2.5 MG/3ML
2.5 SOLUTION RESPIRATORY (INHALATION)
COMMUNITY
Start: 2019-12-16 | End: 2020-05-26 | Stop reason: SDUPTHER

## 2020-02-21 NOTE — PROGRESS NOTES
Assessment/Plan:    No problem-specific Assessment & Plan notes found for this encounter  Diagnoses and all orders for this visit:    Colon cancer screening  -     Ambulatory referral to Gastroenterology; Future    Inflammation of sacroiliac joint (HCC)  -     CBC and differential; Future  -     Comprehensive metabolic panel; Future  -     TSH, 3rd generation; Future  -     Lipid panel; Future    COPD with acute exacerbation (HCC)  -     CBC and differential; Future  -     Comprehensive metabolic panel; Future  -     TSH, 3rd generation; Future  -     Lipid panel; Future  -     Complete PFT with post bronchodilator; Future    Clotting disorder (HCC)  -     CBC and differential; Future  -     Comprehensive metabolic panel; Future  -     TSH, 3rd generation; Future  -     Lipid panel; Future  -     MTHFR mutation; Future    Essential hypertension    Mixed hyperlipidemia    Anxiety  -     venlafaxine 75 mg 24 hr tablet; Take 1 tablet (75 mg total) by mouth daily with breakfast    Lung nodule < 6cm on CT  -     CT chest wo contrast; Future    Chronic pain syndrome    Other orders  -     albuterol (2 5 mg/3 mL) 0 083 % nebulizer solution; Inhale 2 5 mg          Subjective:      Patient ID: Roselia Rivera is a 69 y o  female  She is doing well overall  Her BP is at goal on her current regimen  She has no chest pain or shortness of breath  She has no headache or vision changes  She has no PND, orthopnea, or dyspnea on exertion  She is interested in smoking cessation  She plans on setting a quit date when the weather warms  I have given her written instructions today about how to prepare for her quit date  We will consider Chantix when the time gets closer  She has COPD  She is having a bit of dyspnea with the colder weather  She is on Stiolto in Combivent  She states this regimen is doing well for her  She has no increased cough or sputum production      She has chronic low back pain for which she takes Vicodin  She has been on this medication for decades  We have weaned a bit but had to stop that process due to increasing pain  She has a signed treatment agreement in the chart  She has urine toxicology consistent with regular use of her prescribed medications  We have discussed the risks and benefits of long-term opiate based pain medication and she and I are both in agreement that in her case the benefits outweigh the risks  There has been no concern for abuse, misuse, or diversion  With the pain medication she is able to do housework and regular chores  She is able to go to the store  Without her medication she has very limited mobility and is housebound  I have queried the prescription drug monitoring program to be sure that we are the only provider writing for this medication  There are no red flags  She has a history of gastric ulcer  She is not a candidate for nonsteroidal anti-inflammatories  She has failed Tylenol and physical therapy  Her mg morphine equivalent is 40, below the 50 mg recommended by the [facility] chronic pain guidelines  The following portions of the patient's history were reviewed and updated as appropriate:   She  has a past medical history of Elevated liver enzymes, Fracture of rib, HPV (human papilloma virus) infection, Osteoporosis, Pulmonary nodule, and Thyroid nodule    She   Patient Active Problem List    Diagnosis Date Noted    Clotting disorder ([facility] ) 02/21/2020    BMI 26 0-26 9,adult 05/10/2019    Smoker 05/10/2019    Medicare annual wellness visit, subsequent 05/10/2019    Chronic groin pain, right 12/19/2018    Thyroid nodule 10/16/2018    Chronic pain of right knee 10/16/2018    Pulmonary nodules 09/11/2018    Cytologic evidence of malignancy on smear of cervix 09/11/2018    Asymptomatic microscopic hematuria 06/11/2018    Rash of face 06/11/2018    Essential hypertension 03/22/2018    Other hyperlipidemia 03/22/2018    Screening for genitourinary condition 03/22/2018    Screening for neurological condition 03/22/2018    COPD with acute exacerbation ([facility] ) 12/13/2017    Hyperlipidemia 08/31/2015    Hypertension 08/31/2015    Inflammation of sacroiliac joint ([facility] ) 08/29/2015    Lumbar disc disease 08/18/2014    HTN, goal below 140/90 08/24/2011     She  has a past surgical history that includes Appendectomy; Back surgery; Cholecystectomy; Foot surgery; Total hip arthroplasty (Right); Total abdominal hysterectomy; Colposcopy; Colonoscopy w/ biopsies; and US guided thyroid biopsy  Her family history includes Diabetes type II in her mother; Heart disease in her father and mother  She  reports that she has been smoking  She has never used smokeless tobacco  She reports that she does not drink alcohol or use drugs    Current Outpatient Medications   Medication Sig Dispense Refill    albuterol (2 5 mg/3 mL) 0 083 % nebulizer solution Inhale 2 5 mg      atenolol (TENORMIN) 50 mg tablet Take 1 tablet (50 mg total) by mouth daily 90 tablet 3    azelastine (ASTELIN) 0 1 % nasal spray 1 spray into each nostril 2 (two) times a day Use in each nostril as directed 30 mL 5    COMBIVENT RESPIMAT inhaler Inhale 1 puff 4 (four) times a day 4 g 5    diazepam (VALIUM) 5 mg tablet Take 1 tablet (5 mg total) by mouth 3 (three) times a day as needed for anxiety 90 tablet 5    erythromycin (ILOTYCIN) ophthalmic ointment APPLY SMALL AMOUNT TO BILATERAL UPPER EYELIDS 4 TIMES DAILY  0    fluticasone (FLONASE) 50 mcg/act nasal spray 2 sprays into each nostril daily 1 Bottle 5    HYDROcodone-acetaminophen (NORCO)  mg per tablet Take 1 tablet by mouth every 6 (six) hours as needed for moderate painMax Daily Amount: 4 tablets 120 tablet 0    Magnesium 500 MG TABS Take 1 tablet by mouth daily      tiotropium-olodaterol (STIOLTO RESPIMAT) 2 5-2 5 MCG/ACT inhaler Inhale 1 puff daily 3 Inhaler 3    venlafaxine 75 mg 24 hr tablet Take 1 tablet (75 mg total) by mouth daily with breakfast 30 tablet 5     No current facility-administered medications for this visit  Current Outpatient Medications on File Prior to Visit   Medication Sig    albuterol (2 5 mg/3 mL) 0 083 % nebulizer solution Inhale 2 5 mg    atenolol (TENORMIN) 50 mg tablet Take 1 tablet (50 mg total) by mouth daily    azelastine (ASTELIN) 0 1 % nasal spray 1 spray into each nostril 2 (two) times a day Use in each nostril as directed    COMBIVENT RESPIMAT inhaler Inhale 1 puff 4 (four) times a day    diazepam (VALIUM) 5 mg tablet Take 1 tablet (5 mg total) by mouth 3 (three) times a day as needed for anxiety    erythromycin (ILOTYCIN) ophthalmic ointment APPLY SMALL AMOUNT TO BILATERAL UPPER EYELIDS 4 TIMES DAILY    fluticasone (FLONASE) 50 mcg/act nasal spray 2 sprays into each nostril daily    HYDROcodone-acetaminophen (NORCO)  mg per tablet Take 1 tablet by mouth every 6 (six) hours as needed for moderate painMax Daily Amount: 4 tablets    Magnesium 500 MG TABS Take 1 tablet by mouth daily    tiotropium-olodaterol (STIOLTO RESPIMAT) 2 5-2 5 MCG/ACT inhaler Inhale 1 puff daily    [DISCONTINUED] venlafaxine (EFFEXOR) 75 mg tablet     [DISCONTINUED] venlafaxine 75 mg 24 hr tablet Take 1 tablet (75 mg total) by mouth daily with breakfast     No current facility-administered medications on file prior to visit  She has No Known Allergies       Review of Systems   All other systems reviewed and are negative  Objective:      /78   Pulse 69   Ht 5' 4" (1 626 m)   Wt 71 7 kg (158 lb)   SpO2 96%   BMI 27 12 kg/m²          Physical Exam   Constitutional: She is oriented to person, place, and time  She appears well-developed and well-nourished  Neck: Normal range of motion  Neck supple  Cardiovascular: Normal rate, regular rhythm, normal heart sounds and intact distal pulses  Pulmonary/Chest: Effort normal and breath sounds normal    Abdominal: Soft   Bowel sounds are normal    Musculoskeletal: Normal range of motion  Neurological: She is alert and oriented to person, place, and time  Skin: Skin is warm and dry  Capillary refill takes less than 2 seconds  Psychiatric: She has a normal mood and affect  Her behavior is normal  Judgment and thought content normal    Nursing note and vitals reviewed

## 2020-02-22 DIAGNOSIS — G89.4 CHRONIC PAIN SYNDROME: ICD-10-CM

## 2020-02-22 RX ORDER — HYDROCODONE BITARTRATE AND ACETAMINOPHEN 10; 325 MG/1; MG/1
1 TABLET ORAL EVERY 6 HOURS PRN
Qty: 120 TABLET | Refills: 0 | Status: CANCELLED | OUTPATIENT
Start: 2020-02-22

## 2020-02-24 DIAGNOSIS — G89.4 CHRONIC PAIN SYNDROME: ICD-10-CM

## 2020-02-24 RX ORDER — HYDROCODONE BITARTRATE AND ACETAMINOPHEN 10; 325 MG/1; MG/1
1 TABLET ORAL EVERY 6 HOURS PRN
Qty: 120 TABLET | Refills: 0 | Status: SHIPPED | OUTPATIENT
Start: 2020-02-24 | End: 2020-03-31 | Stop reason: SDUPTHER

## 2020-03-02 ENCOUNTER — APPOINTMENT (OUTPATIENT)
Dept: LAB | Facility: HOSPITAL | Age: 75
End: 2020-03-02
Attending: FAMILY MEDICINE
Payer: COMMERCIAL

## 2020-03-02 ENCOUNTER — HOSPITAL ENCOUNTER (OUTPATIENT)
Dept: CT IMAGING | Facility: HOSPITAL | Age: 75
Discharge: HOME | End: 2020-03-02
Attending: FAMILY MEDICINE
Payer: COMMERCIAL

## 2020-03-02 DIAGNOSIS — D68.9 CLOTTING DISORDER (HCC): ICD-10-CM

## 2020-03-02 DIAGNOSIS — J44.1 COPD WITH ACUTE EXACERBATION (HCC): ICD-10-CM

## 2020-03-02 DIAGNOSIS — R91.1 LUNG NODULE < 6CM ON CT: ICD-10-CM

## 2020-03-02 DIAGNOSIS — M46.1 INFLAMMATION OF SACROILIAC JOINT (HCC): ICD-10-CM

## 2020-03-02 LAB
ALBUMIN SERPL BCP-MCNC: 3.4 G/DL (ref 3.5–5)
ALP SERPL-CCNC: 105 U/L (ref 46–116)
ALT SERPL W P-5'-P-CCNC: 15 U/L (ref 12–78)
ANION GAP SERPL CALCULATED.3IONS-SCNC: 11 MMOL/L (ref 4–13)
AST SERPL W P-5'-P-CCNC: 13 U/L (ref 5–45)
BASOPHILS # BLD AUTO: 0.08 THOUSANDS/ΜL (ref 0–0.1)
BASOPHILS NFR BLD AUTO: 1 % (ref 0–1)
BILIRUB SERPL-MCNC: 0.3 MG/DL (ref 0.2–1)
BUN SERPL-MCNC: 24 MG/DL (ref 5–25)
CALCIUM SERPL-MCNC: 9.3 MG/DL (ref 8.3–10.1)
CHLORIDE SERPL-SCNC: 105 MMOL/L (ref 100–108)
CHOLEST SERPL-MCNC: 197 MG/DL (ref 50–200)
CO2 SERPL-SCNC: 26 MMOL/L (ref 21–32)
CREAT SERPL-MCNC: 0.69 MG/DL (ref 0.6–1.3)
EOSINOPHIL # BLD AUTO: 0.3 THOUSAND/ΜL (ref 0–0.61)
EOSINOPHIL NFR BLD AUTO: 3 % (ref 0–6)
ERYTHROCYTE [DISTWIDTH] IN BLOOD BY AUTOMATED COUNT: 13 % (ref 11.6–15.1)
GFR SERPL CREATININE-BSD FRML MDRD: 87 ML/MIN/1.73SQ M
GLUCOSE P FAST SERPL-MCNC: 106 MG/DL (ref 65–99)
HCT VFR BLD AUTO: 48.5 % (ref 34.8–46.1)
HDLC SERPL-MCNC: 52 MG/DL
HGB BLD-MCNC: 15.6 G/DL (ref 11.5–15.4)
IMM GRANULOCYTES # BLD AUTO: 0.03 THOUSAND/UL (ref 0–0.2)
IMM GRANULOCYTES NFR BLD AUTO: 0 % (ref 0–2)
LDLC SERPL CALC-MCNC: 130 MG/DL (ref 0–100)
LYMPHOCYTES # BLD AUTO: 1.93 THOUSANDS/ΜL (ref 0.6–4.47)
LYMPHOCYTES NFR BLD AUTO: 16 % (ref 14–44)
MCH RBC QN AUTO: 30.8 PG (ref 26.8–34.3)
MCHC RBC AUTO-ENTMCNC: 32.2 G/DL (ref 31.4–37.4)
MCV RBC AUTO: 96 FL (ref 82–98)
MONOCYTES # BLD AUTO: 0.7 THOUSAND/ΜL (ref 0.17–1.22)
MONOCYTES NFR BLD AUTO: 6 % (ref 4–12)
NEUTROPHILS # BLD AUTO: 8.81 THOUSANDS/ΜL (ref 1.85–7.62)
NEUTS SEG NFR BLD AUTO: 74 % (ref 43–75)
NONHDLC SERPL-MCNC: 145 MG/DL
NRBC BLD AUTO-RTO: 0 /100 WBCS
PLATELET # BLD AUTO: 271 THOUSANDS/UL (ref 149–390)
PMV BLD AUTO: 9.5 FL (ref 8.9–12.7)
POTASSIUM SERPL-SCNC: 4.4 MMOL/L (ref 3.5–5.3)
PROT SERPL-MCNC: 7.5 G/DL (ref 6.4–8.2)
RBC # BLD AUTO: 5.06 MILLION/UL (ref 3.81–5.12)
SODIUM SERPL-SCNC: 142 MMOL/L (ref 136–145)
TRIGL SERPL-MCNC: 73 MG/DL
TSH SERPL DL<=0.05 MIU/L-ACNC: 0.78 UIU/ML (ref 0.36–3.74)
WBC # BLD AUTO: 11.85 THOUSAND/UL (ref 4.31–10.16)

## 2020-03-02 PROCEDURE — 80053 COMPREHEN METABOLIC PANEL: CPT

## 2020-03-02 PROCEDURE — 80061 LIPID PANEL: CPT

## 2020-03-02 PROCEDURE — 36415 COLL VENOUS BLD VENIPUNCTURE: CPT

## 2020-03-02 PROCEDURE — 84443 ASSAY THYROID STIM HORMONE: CPT

## 2020-03-02 PROCEDURE — 85025 COMPLETE CBC W/AUTO DIFF WBC: CPT

## 2020-03-02 PROCEDURE — 71250 CT THORAX DX C-: CPT

## 2020-03-13 LAB — MTHFR GENE MUT ANL BLD/T: NORMAL

## 2020-03-31 DIAGNOSIS — G89.4 CHRONIC PAIN SYNDROME: ICD-10-CM

## 2020-04-01 DIAGNOSIS — J44.9 CHRONIC OBSTRUCTIVE PULMONARY DISEASE, UNSPECIFIED COPD TYPE (HCC): ICD-10-CM

## 2020-04-02 RX ORDER — HYDROCODONE BITARTRATE AND ACETAMINOPHEN 10; 325 MG/1; MG/1
1 TABLET ORAL EVERY 6 HOURS PRN
Qty: 120 TABLET | Refills: 0 | Status: SHIPPED | OUTPATIENT
Start: 2020-04-02 | End: 2020-05-12 | Stop reason: SDUPTHER

## 2020-04-02 RX ORDER — TIOTROPIUM BROMIDE AND OLODATEROL 3.124; 2.736 UG/1; UG/1
SPRAY, METERED RESPIRATORY (INHALATION)
Qty: 12 G | Refills: 5 | Status: SHIPPED | OUTPATIENT
Start: 2020-04-02 | End: 2021-08-23

## 2020-05-12 DIAGNOSIS — G89.4 CHRONIC PAIN SYNDROME: ICD-10-CM

## 2020-05-12 RX ORDER — HYDROCODONE BITARTRATE AND ACETAMINOPHEN 10; 325 MG/1; MG/1
1 TABLET ORAL EVERY 6 HOURS PRN
Qty: 120 TABLET | Refills: 0 | Status: SHIPPED | OUTPATIENT
Start: 2020-05-12 | End: 2020-06-17 | Stop reason: SDUPTHER

## 2020-05-26 ENCOUNTER — OFFICE VISIT (OUTPATIENT)
Dept: FAMILY MEDICINE CLINIC | Facility: CLINIC | Age: 75
End: 2020-05-26
Payer: COMMERCIAL

## 2020-05-26 VITALS
TEMPERATURE: 99 F | BODY MASS INDEX: 27.66 KG/M2 | OXYGEN SATURATION: 96 % | WEIGHT: 162 LBS | HEART RATE: 60 BPM | HEIGHT: 64 IN

## 2020-05-26 DIAGNOSIS — F17.200 TOBACCO DEPENDENCY: ICD-10-CM

## 2020-05-26 DIAGNOSIS — J44.1 COPD WITH ACUTE EXACERBATION (HCC): ICD-10-CM

## 2020-05-26 DIAGNOSIS — Z00.00 MEDICARE ANNUAL WELLNESS VISIT, SUBSEQUENT: Primary | ICD-10-CM

## 2020-05-26 DIAGNOSIS — I10 ESSENTIAL HYPERTENSION: ICD-10-CM

## 2020-05-26 DIAGNOSIS — F17.200 SMOKER: ICD-10-CM

## 2020-05-26 PROCEDURE — 4004F PT TOBACCO SCREEN RCVD TLK: CPT | Performed by: FAMILY MEDICINE

## 2020-05-26 PROCEDURE — 1170F FXNL STATUS ASSESSED: CPT | Performed by: FAMILY MEDICINE

## 2020-05-26 PROCEDURE — 4040F PNEUMOC VAC/ADMIN/RCVD: CPT | Performed by: FAMILY MEDICINE

## 2020-05-26 PROCEDURE — G0439 PPPS, SUBSEQ VISIT: HCPCS | Performed by: FAMILY MEDICINE

## 2020-05-26 PROCEDURE — 3078F DIAST BP <80 MM HG: CPT | Performed by: FAMILY MEDICINE

## 2020-05-26 PROCEDURE — 3075F SYST BP GE 130 - 139MM HG: CPT | Performed by: FAMILY MEDICINE

## 2020-05-26 PROCEDURE — 1160F RVW MEDS BY RX/DR IN RCRD: CPT | Performed by: FAMILY MEDICINE

## 2020-05-26 PROCEDURE — 3008F BODY MASS INDEX DOCD: CPT | Performed by: FAMILY MEDICINE

## 2020-05-26 PROCEDURE — 1125F AMNT PAIN NOTED PAIN PRSNT: CPT | Performed by: FAMILY MEDICINE

## 2020-05-26 RX ORDER — ALBUTEROL SULFATE 2.5 MG/3ML
2.5 SOLUTION RESPIRATORY (INHALATION) EVERY 6 HOURS PRN
Qty: 120 VIAL | Refills: 5 | Status: SHIPPED | OUTPATIENT
Start: 2020-05-26 | End: 2021-08-04 | Stop reason: CLARIF

## 2020-05-26 RX ORDER — IPRATROPIUM/ALBUTEROL SULFATE 20-100 MCG
1 MIST INHALER (GRAM) INHALATION 4 TIMES DAILY
Qty: 4 G | Refills: 5 | Status: SHIPPED | OUTPATIENT
Start: 2020-05-26 | End: 2021-10-25 | Stop reason: SDUPTHER

## 2020-05-26 RX ORDER — HYDROCHLOROTHIAZIDE 12.5 MG/1
12.5 CAPSULE, GELATIN COATED ORAL DAILY
Qty: 90 CAPSULE | Refills: 3 | Status: SHIPPED | OUTPATIENT
Start: 2020-05-26

## 2020-06-03 DIAGNOSIS — F41.9 ANXIETY: ICD-10-CM

## 2020-06-03 RX ORDER — DIAZEPAM 5 MG/1
5 TABLET ORAL 3 TIMES DAILY PRN
Qty: 90 TABLET | Refills: 5 | Status: SHIPPED | OUTPATIENT
Start: 2020-06-03 | End: 2020-12-22 | Stop reason: SDUPTHER

## 2020-06-11 ENCOUNTER — TELEPHONE (OUTPATIENT)
Dept: FAMILY MEDICINE CLINIC | Facility: CLINIC | Age: 75
End: 2020-06-11

## 2020-06-17 DIAGNOSIS — G89.4 CHRONIC PAIN SYNDROME: ICD-10-CM

## 2020-06-17 RX ORDER — HYDROCODONE BITARTRATE AND ACETAMINOPHEN 10; 325 MG/1; MG/1
1 TABLET ORAL EVERY 6 HOURS PRN
Qty: 120 TABLET | Refills: 0 | Status: SHIPPED | OUTPATIENT
Start: 2020-06-17 | End: 2020-07-22 | Stop reason: SDUPTHER

## 2020-07-22 DIAGNOSIS — G89.4 CHRONIC PAIN SYNDROME: ICD-10-CM

## 2020-07-22 RX ORDER — HYDROCODONE BITARTRATE AND ACETAMINOPHEN 10; 325 MG/1; MG/1
1 TABLET ORAL EVERY 6 HOURS PRN
Qty: 120 TABLET | Refills: 0 | Status: SHIPPED | OUTPATIENT
Start: 2020-07-22 | End: 2020-08-31 | Stop reason: SDUPTHER

## 2020-08-11 DIAGNOSIS — I10 ESSENTIAL HYPERTENSION: ICD-10-CM

## 2020-08-11 RX ORDER — ATENOLOL 50 MG/1
TABLET ORAL
Qty: 90 TABLET | Refills: 3 | Status: SHIPPED | OUTPATIENT
Start: 2020-08-11 | End: 2022-01-07

## 2020-08-21 DIAGNOSIS — F41.9 ANXIETY: ICD-10-CM

## 2020-08-21 RX ORDER — VENLAFAXINE 75 MG/1
TABLET ORAL
Qty: 30 TABLET | Refills: 5 | Status: SHIPPED | OUTPATIENT
Start: 2020-08-21 | End: 2021-02-18 | Stop reason: SDUPTHER

## 2020-08-31 DIAGNOSIS — G89.4 CHRONIC PAIN SYNDROME: ICD-10-CM

## 2020-08-31 RX ORDER — HYDROCODONE BITARTRATE AND ACETAMINOPHEN 10; 325 MG/1; MG/1
1 TABLET ORAL EVERY 6 HOURS PRN
Qty: 120 TABLET | Refills: 0 | Status: SHIPPED | OUTPATIENT
Start: 2020-08-31 | End: 2020-10-06 | Stop reason: SDUPTHER

## 2020-09-18 DIAGNOSIS — Z12.39 SCREENING FOR BREAST CANCER: ICD-10-CM

## 2020-10-06 DIAGNOSIS — G89.4 CHRONIC PAIN SYNDROME: ICD-10-CM

## 2020-10-06 RX ORDER — HYDROCODONE BITARTRATE AND ACETAMINOPHEN 10; 325 MG/1; MG/1
1 TABLET ORAL EVERY 6 HOURS PRN
Qty: 120 TABLET | Refills: 0 | Status: SHIPPED | OUTPATIENT
Start: 2020-10-06 | End: 2020-11-10 | Stop reason: SDUPTHER

## 2020-10-19 ENCOUNTER — TELEPHONE (OUTPATIENT)
Dept: FAMILY MEDICINE CLINIC | Facility: CLINIC | Age: 75
End: 2020-10-19

## 2020-10-19 DIAGNOSIS — R31.9 PAINLESS HEMATURIA: Primary | ICD-10-CM

## 2020-10-21 ENCOUNTER — TELEPHONE (OUTPATIENT)
Dept: FAMILY MEDICINE CLINIC | Facility: CLINIC | Age: 75
End: 2020-10-21

## 2020-10-21 DIAGNOSIS — R31.9 PAINLESS HEMATURIA: Primary | ICD-10-CM

## 2020-10-21 DIAGNOSIS — I10 ESSENTIAL HYPERTENSION: ICD-10-CM

## 2020-10-27 ENCOUNTER — HOSPITAL ENCOUNTER (OUTPATIENT)
Dept: CT IMAGING | Facility: HOSPITAL | Age: 75
Discharge: HOME | End: 2020-10-27
Payer: COMMERCIAL

## 2020-10-27 DIAGNOSIS — R31.9 PAINLESS HEMATURIA: ICD-10-CM

## 2020-10-27 PROCEDURE — G1004 CDSM NDSC: HCPCS

## 2020-10-27 PROCEDURE — 74178 CT ABD&PLV WO CNTR FLWD CNTR: CPT

## 2020-10-27 RX ADMIN — IOHEXOL 100 ML: 350 INJECTION, SOLUTION INTRAVENOUS at 11:17

## 2020-11-10 DIAGNOSIS — G89.4 CHRONIC PAIN SYNDROME: ICD-10-CM

## 2020-11-11 RX ORDER — HYDROCODONE BITARTRATE AND ACETAMINOPHEN 10; 325 MG/1; MG/1
1 TABLET ORAL EVERY 6 HOURS PRN
Qty: 120 TABLET | Refills: 0 | Status: SHIPPED | OUTPATIENT
Start: 2020-11-11 | End: 2020-12-15 | Stop reason: SDUPTHER

## 2020-11-27 ENCOUNTER — OFFICE VISIT (OUTPATIENT)
Dept: FAMILY MEDICINE CLINIC | Facility: CLINIC | Age: 75
End: 2020-11-27
Payer: COMMERCIAL

## 2020-11-27 VITALS
HEIGHT: 64 IN | TEMPERATURE: 98.3 F | DIASTOLIC BLOOD PRESSURE: 90 MMHG | RESPIRATION RATE: 18 BRPM | SYSTOLIC BLOOD PRESSURE: 144 MMHG | OXYGEN SATURATION: 96 % | BODY MASS INDEX: 27.21 KG/M2 | WEIGHT: 159.4 LBS | HEART RATE: 60 BPM

## 2020-11-27 DIAGNOSIS — F02.80 SEMANTIC DEMENTIA WITHOUT BEHAVIORAL DISTURBANCE (HCC): ICD-10-CM

## 2020-11-27 DIAGNOSIS — F33.41 RECURRENT MAJOR DEPRESSIVE DISORDER, IN PARTIAL REMISSION (HCC): ICD-10-CM

## 2020-11-27 DIAGNOSIS — I70.0 ATHEROSCLEROSIS OF ABDOMINAL AORTA (HCC): ICD-10-CM

## 2020-11-27 DIAGNOSIS — G31.09 SEMANTIC DEMENTIA WITHOUT BEHAVIORAL DISTURBANCE (HCC): ICD-10-CM

## 2020-11-27 DIAGNOSIS — F11.20 OPIOID DEPENDENCE, UNCOMPLICATED (HCC): ICD-10-CM

## 2020-11-27 DIAGNOSIS — I71.4 ABDOMINAL AORTIC ANEURYSM (AAA) WITHOUT RUPTURE (HCC): Primary | ICD-10-CM

## 2020-11-27 PROCEDURE — 3080F DIAST BP >= 90 MM HG: CPT | Performed by: FAMILY MEDICINE

## 2020-11-27 PROCEDURE — 3077F SYST BP >= 140 MM HG: CPT | Performed by: FAMILY MEDICINE

## 2020-11-27 PROCEDURE — 3725F SCREEN DEPRESSION PERFORMED: CPT | Performed by: FAMILY MEDICINE

## 2020-11-27 PROCEDURE — 4004F PT TOBACCO SCREEN RCVD TLK: CPT | Performed by: FAMILY MEDICINE

## 2020-11-27 PROCEDURE — 3008F BODY MASS INDEX DOCD: CPT | Performed by: FAMILY MEDICINE

## 2020-11-27 PROCEDURE — 99213 OFFICE O/P EST LOW 20 MIN: CPT | Performed by: FAMILY MEDICINE

## 2020-11-27 PROCEDURE — 1160F RVW MEDS BY RX/DR IN RCRD: CPT | Performed by: FAMILY MEDICINE

## 2020-12-15 DIAGNOSIS — G89.4 CHRONIC PAIN SYNDROME: ICD-10-CM

## 2020-12-15 RX ORDER — HYDROCODONE BITARTRATE AND ACETAMINOPHEN 10; 325 MG/1; MG/1
1 TABLET ORAL EVERY 6 HOURS PRN
Qty: 120 TABLET | Refills: 0 | Status: SHIPPED | OUTPATIENT
Start: 2020-12-15 | End: 2021-01-19 | Stop reason: SDUPTHER

## 2020-12-22 DIAGNOSIS — F41.9 ANXIETY: ICD-10-CM

## 2020-12-23 RX ORDER — DIAZEPAM 5 MG/1
5 TABLET ORAL 3 TIMES DAILY PRN
Qty: 90 TABLET | Refills: 0 | Status: SHIPPED | OUTPATIENT
Start: 2020-12-23 | End: 2021-01-29 | Stop reason: SDUPTHER

## 2021-01-19 DIAGNOSIS — G89.4 CHRONIC PAIN SYNDROME: ICD-10-CM

## 2021-01-19 RX ORDER — HYDROCODONE BITARTRATE AND ACETAMINOPHEN 10; 325 MG/1; MG/1
1 TABLET ORAL EVERY 6 HOURS PRN
Qty: 120 TABLET | Refills: 0 | Status: SHIPPED | OUTPATIENT
Start: 2021-01-19 | End: 2021-02-22 | Stop reason: SDUPTHER

## 2021-01-29 DIAGNOSIS — F41.9 ANXIETY: ICD-10-CM

## 2021-01-29 RX ORDER — DIAZEPAM 5 MG/1
5 TABLET ORAL 3 TIMES DAILY PRN
Qty: 90 TABLET | Refills: 0 | Status: SHIPPED | OUTPATIENT
Start: 2021-01-29 | End: 2021-03-05 | Stop reason: SDUPTHER

## 2021-02-18 DIAGNOSIS — F41.9 ANXIETY: ICD-10-CM

## 2021-02-18 RX ORDER — VENLAFAXINE 75 MG/1
75 TABLET ORAL
Qty: 30 TABLET | Refills: 5 | Status: SHIPPED | OUTPATIENT
Start: 2021-02-18 | End: 2021-06-04

## 2021-02-22 DIAGNOSIS — G89.4 CHRONIC PAIN SYNDROME: ICD-10-CM

## 2021-02-22 RX ORDER — HYDROCODONE BITARTRATE AND ACETAMINOPHEN 10; 325 MG/1; MG/1
1 TABLET ORAL EVERY 6 HOURS PRN
Qty: 120 TABLET | Refills: 0 | Status: SHIPPED | OUTPATIENT
Start: 2021-02-22 | End: 2021-03-26 | Stop reason: SDUPTHER

## 2021-03-05 DIAGNOSIS — F41.9 ANXIETY: ICD-10-CM

## 2021-03-05 RX ORDER — DIAZEPAM 5 MG/1
5 TABLET ORAL 3 TIMES DAILY PRN
Qty: 90 TABLET | Refills: 0 | Status: SHIPPED | OUTPATIENT
Start: 2021-03-05 | End: 2021-04-15 | Stop reason: SDUPTHER

## 2021-03-26 DIAGNOSIS — G89.4 CHRONIC PAIN SYNDROME: ICD-10-CM

## 2021-03-26 RX ORDER — HYDROCODONE BITARTRATE AND ACETAMINOPHEN 10; 325 MG/1; MG/1
1 TABLET ORAL EVERY 6 HOURS PRN
Qty: 120 TABLET | Refills: 0 | Status: SHIPPED | OUTPATIENT
Start: 2021-03-26 | End: 2021-04-29 | Stop reason: SDUPTHER

## 2021-04-11 DIAGNOSIS — J31.0 CHRONIC RHINITIS: ICD-10-CM

## 2021-04-11 RX ORDER — FLUTICASONE PROPIONATE 50 MCG
SPRAY, SUSPENSION (ML) NASAL
Qty: 16 G | Refills: 5 | Status: SHIPPED | OUTPATIENT
Start: 2021-04-11 | End: 2021-08-04 | Stop reason: CLARIF

## 2021-04-15 DIAGNOSIS — F41.9 ANXIETY: ICD-10-CM

## 2021-04-15 RX ORDER — DIAZEPAM 5 MG/1
5 TABLET ORAL 3 TIMES DAILY PRN
Qty: 90 TABLET | Refills: 0 | Status: SHIPPED | OUTPATIENT
Start: 2021-04-15 | End: 2021-05-24 | Stop reason: SDUPTHER

## 2021-04-29 DIAGNOSIS — G89.4 CHRONIC PAIN SYNDROME: ICD-10-CM

## 2021-04-29 RX ORDER — HYDROCODONE BITARTRATE AND ACETAMINOPHEN 10; 325 MG/1; MG/1
1 TABLET ORAL EVERY 6 HOURS PRN
Qty: 120 TABLET | Refills: 0 | Status: SHIPPED | OUTPATIENT
Start: 2021-04-29 | End: 2021-06-04 | Stop reason: SDUPTHER

## 2021-05-24 DIAGNOSIS — F41.9 ANXIETY: ICD-10-CM

## 2021-05-24 RX ORDER — DIAZEPAM 5 MG/1
5 TABLET ORAL 3 TIMES DAILY PRN
Qty: 90 TABLET | Refills: 0 | Status: SHIPPED | OUTPATIENT
Start: 2021-05-24 | End: 2021-06-30 | Stop reason: SDUPTHER

## 2021-06-01 ENCOUNTER — RA CDI HCC (OUTPATIENT)
Dept: OTHER | Facility: HOSPITAL | Age: 76
End: 2021-06-01

## 2021-06-01 NOTE — PROGRESS NOTES
Priy[facility]  coding opportunities          Chart reviewed, no opportunity found: CHART REVIEWED, NO OPPORTUNITY FOUND              Patients insurance company: [employer]

## 2021-06-02 RX ORDER — ATENOLOL 25 MG/1
50 TABLET ORAL DAILY
COMMUNITY
Start: 2021-03-16 | End: 2021-09-20 | Stop reason: SDUPTHER

## 2021-06-04 ENCOUNTER — OFFICE VISIT (OUTPATIENT)
Dept: FAMILY MEDICINE CLINIC | Facility: CLINIC | Age: 76
End: 2021-06-04
Payer: COMMERCIAL

## 2021-06-04 VITALS
DIASTOLIC BLOOD PRESSURE: 88 MMHG | SYSTOLIC BLOOD PRESSURE: 140 MMHG | WEIGHT: 158 LBS | OXYGEN SATURATION: 98 % | HEART RATE: 81 BPM | TEMPERATURE: 98.3 F | BODY MASS INDEX: 26.98 KG/M2 | HEIGHT: 64 IN

## 2021-06-04 DIAGNOSIS — Z12.2 ENCOUNTER FOR SCREENING FOR LUNG CANCER: ICD-10-CM

## 2021-06-04 DIAGNOSIS — I71.4 ABDOMINAL AORTIC ANEURYSM (AAA) WITHOUT RUPTURE (HCC): ICD-10-CM

## 2021-06-04 DIAGNOSIS — I10 ESSENTIAL HYPERTENSION: ICD-10-CM

## 2021-06-04 DIAGNOSIS — I70.0 ATHEROSCLEROSIS OF ABDOMINAL AORTA (HCC): ICD-10-CM

## 2021-06-04 DIAGNOSIS — F33.1 MODERATE EPISODE OF RECURRENT MAJOR DEPRESSIVE DISORDER (HCC): ICD-10-CM

## 2021-06-04 DIAGNOSIS — J44.1 COPD WITH ACUTE EXACERBATION (HCC): Primary | ICD-10-CM

## 2021-06-04 DIAGNOSIS — E04.1 THYROID NODULE: ICD-10-CM

## 2021-06-04 DIAGNOSIS — G89.4 CHRONIC PAIN SYNDROME: ICD-10-CM

## 2021-06-04 DIAGNOSIS — R91.8 PULMONARY NODULES: ICD-10-CM

## 2021-06-04 PROCEDURE — 3008F BODY MASS INDEX DOCD: CPT | Performed by: FAMILY MEDICINE

## 2021-06-04 PROCEDURE — 99214 OFFICE O/P EST MOD 30 MIN: CPT | Performed by: FAMILY MEDICINE

## 2021-06-04 PROCEDURE — 3725F SCREEN DEPRESSION PERFORMED: CPT | Performed by: FAMILY MEDICINE

## 2021-06-04 PROCEDURE — 1170F FXNL STATUS ASSESSED: CPT | Performed by: FAMILY MEDICINE

## 2021-06-04 PROCEDURE — 1125F AMNT PAIN NOTED PAIN PRSNT: CPT | Performed by: FAMILY MEDICINE

## 2021-06-04 PROCEDURE — G0439 PPPS, SUBSEQ VISIT: HCPCS | Performed by: FAMILY MEDICINE

## 2021-06-04 PROCEDURE — 1160F RVW MEDS BY RX/DR IN RCRD: CPT | Performed by: FAMILY MEDICINE

## 2021-06-04 PROCEDURE — 1101F PT FALLS ASSESS-DOCD LE1/YR: CPT | Performed by: FAMILY MEDICINE

## 2021-06-04 PROCEDURE — 3288F FALL RISK ASSESSMENT DOCD: CPT | Performed by: FAMILY MEDICINE

## 2021-06-04 PROCEDURE — 4004F PT TOBACCO SCREEN RCVD TLK: CPT | Performed by: FAMILY MEDICINE

## 2021-06-04 RX ORDER — HYDROCODONE BITARTRATE AND ACETAMINOPHEN 10; 325 MG/1; MG/1
1 TABLET ORAL EVERY 6 HOURS PRN
Qty: 120 TABLET | Refills: 0 | Status: SHIPPED | OUTPATIENT
Start: 2021-06-04 | End: 2021-07-09

## 2021-06-04 RX ORDER — VENLAFAXINE HYDROCHLORIDE 150 MG/1
150 CAPSULE, EXTENDED RELEASE ORAL
Qty: 30 CAPSULE | Refills: 5 | Status: SHIPPED | OUTPATIENT
Start: 2021-06-04 | End: 2021-06-23

## 2021-06-04 NOTE — PROGRESS NOTES
BMI Counseling: Body mass index is 27 12 kg/m²  The BMI is above normal  Nutrition recommendations include reducing portion sizes, decreasing overall calorie intake, 3-5 servings of fruits/vegetables daily, reducing fast food intake, consuming healthier snacks, decreasing soda and/or juice intake, moderation in carbohydrate intake, increasing intake of lean protein, reducing intake of saturated fat and trans fat and reducing intake of cholesterol  Exercise recommendations include moderate aerobic physical activity for 150 minutes/week, vigorous aerobic physical activity for 75 minutes/week, exercising 3-5 times per week, joining a gym and strength training exercises

## 2021-06-04 NOTE — PROGRESS NOTES
Assessment and Plan:     Problem List Items Addressed This Visit     None           Preventive health issues were discussed with patient, and age appropriate screening tests were ordered as noted in patient's After Visit Summary  Personalized health advice and appropriate referrals for health education or preventive services given if needed, as noted in patient's After Visit Summary       History of Present Illness:     Patient presents for Medicare Annual Wellness visit    Patient Care Team:  Ashlyn Macias MD as PCP - General  Ashlyn Macias MD as PCP - [address] [place], [place] [address]Th [facility] ([facility])  Ashlyn Macias MD as PCP - [facility] ([facility])     Problem List:     Patient Active Problem List   Diagnosis    Essential hypertension    Other hyperlipidemia    Screening for genitourinary condition    Screening for neurological condition    Hyperlipidemia    Hypertension    Asymptomatic microscopic hematuria    Rash of face    Pulmonary nodules    Lumbar disc disease    Cytologic evidence of malignancy on smear of cervix    COPD with acute exacerbation ([address] )    HTN, goal below 140/90    Inflammation of sacroiliac joint ([address] )    Thyroid nodule    Chronic pain of right knee    Chronic groin pain, right    BMI 26 0-26 9,adult    Smoker    Medicare annual wellness visit, subsequent    [facility] disorder [facility]    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 ([address] )    Opioid dependence, uncomplicated ([address] )    Recurrent major depressive disorder, in partial remission ([address] )    Atherosclerosis of abdominal aorta ([facility] )      Past Medical and Surgical History:     Past Medical History:   Diagnosis Date    Elevated liver enzymes     last assessed: 12/27/2016    Fracture of rib     last assessed: 08/31/2015    HPV (human papilloma virus) infection     Osteoporosis     Pulmonary nodule     Thyroid nodule      Past Surgical History:   Procedure Laterality Date    APPENDECTOMY      last assessed: 08/31/2015   Germaine Weathers BACK SURGERY      last assessed: 08/31/2015    CHOLECYSTECTOMY      last assessed: 08/31/2015    COLONOSCOPY W/ BIOPSIES      COLPOSCOPY      FOOT SURGERY      last assessed: 08/31/2015    TOTAL ABDOMINAL HYSTERECTOMY      last assessed: 08/31/2015; unilateral oopherectomy    TOTAL HIP ARTHROPLASTY Right     last assessed: 08/31/2015; [address]    US GUIDED THYROID BIOPSY        Family History:     Family History   Problem Relation Age of Onset    Heart disease Mother         cardiac disorder    Diabetes type II Mother     Heart disease Father         cardiac disorder       Social History:        Social History     Socioeconomic History    Marital status:       Spouse name: Not on file    Number of children: Not on file    Years of education: Not on file    Highest education level: Not on file   Occupational History    Not on file   Social Needs    Financial resource strain: Not on file    Food insecurity     Worry: Not on file     Inability: Not on file    Transportation needs     Medical: No     Non-medical: No   Tobacco Use    Smoking status: Current Every Day Smoker    Smokeless tobacco: Never Used    Tobacco comment: tobacco use   Substance and Sexual Activity    Alcohol use: No    Drug use: No    Sexual activity: Not on file   Lifestyle    Physical activity     Days per week: 3 days     Minutes per session: Not on file    Stress: Not on file   Relationships    Social connections     Talks on phone: Not on file     Gets together: Not on file     Attends Sabianist service: Not on file     Active member of club or organization: Not on file     Attends meetings of clubs or organizations: Not on file     Relationship status: Not on file    Intimate partner violence     Fear of current or ex partner: Not on file     Emotionally abused: Not on file     Physically abused: Not on file     Forced sexual activity: Not on file   Other Topics Concern    Not on file   Social History Narrative  Not on file      Medications and Allergies:     Current Outpatient Medications   Medication Sig Dispense Refill    albuterol (2 5 mg/3 mL) 0 083 % nebulizer solution Take 1 vial (2 5 mg total) by nebulization every 6 (six) hours as needed for wheezing or shortness of breath 120 vial 5    atenolol (TENORMIN) 25 mg tablet Take 50 mg by mouth daily      atenolol (TENORMIN) 50 mg tablet take 1 tablet by mouth once daily 90 tablet 3    azelastine (ASTELIN) 0 1 % nasal spray 1 spray into each nostril 2 (two) times a day Use in each nostril as directed 30 mL 5    COMBIVENT RESPIMAT inhaler Inhale 1 puff 4 (four) times a day 4 g 5    diazepam (VALIUM) 5 mg tablet Take 1 tablet (5 mg total) by mouth 3 (three) times a day as needed for anxiety 90 tablet 0    erythromycin (ILOTYCIN) ophthalmic ointment APPLY SMALL AMOUNT TO BILATERAL UPPER EYELIDS 4 TIMES DAILY  0    fluticasone (FLONASE) 50 mcg/act nasal spray instill 2 sprays into each nostril once daily 16 g 5    hydrochlorothiazide (MICROZIDE) 12 5 mg capsule Take 1 capsule (12 5 mg total) by mouth daily 90 capsule 3    HYDROcodone-acetaminophen (NORCO)  mg per tablet Take 1 tablet by mouth every 6 (six) hours as needed for moderate painMax Daily Amount: 4 tablets 120 tablet 0    Magnesium 500 MG TABS Take 1 tablet by mouth daily      STIOLTO RESPIMAT 2 5-2 5 MCG/ACT inhaler inhale 1 puff by mouth daily 12 g 5    venlafaxine (EFFEXOR) 75 mg tablet Take 1 tablet (75 mg total) by mouth daily with breakfast 30 tablet 5     No current facility-administered medications for this visit        No Known Allergies   Immunizations:     Immunization History   Administered Date(s) Administered    H1N1, All Formulations 03/19/2010    INFLUENZA 01/14/2013, 11/03/2015, 12/15/2017, 11/14/2018    Influenza Quadrivalent Preservative Free 3 years and older IM 11/03/2015    Influenza, high dose seasonal 0 7 mL 11/14/2018, 11/19/2019    Influenza, seasonal, injectable 12/15/2017    Pneumococcal Conjugate 13-Valent 12/21/2017    Pneumococcal Polysaccharide PPV23 07/23/2013, 11/03/2015    Tdap 07/23/2013    Tuberculin Skin Test-PPD Intradermal 09/17/2013    Zoster 11/03/2015      Health Maintenance:         Topic Date Due    MAMMOGRAM  09/16/2021    Colonoscopy Surveillance  08/09/2023    Colorectal Cancer Screening  08/09/2028    Hepatitis C Screening  Completed         Topic Date Due    COVID-19 Vaccine (1) Never done    Influenza Vaccine (Season Ended) 09/01/2021      Medicare Health Risk Assessment:     There were no vitals taken for this visit  Carlos Rucker is here for her Subsequent Wellness visit  Last Medicare Wellness visit information reviewed, patient interviewed, no change since last AWV  Health Risk Assessment:   Patient rates overall health as good  Patient feels that their physical health rating is slightly worse  Patient is satisfied with their life  Eyesight was rated as slightly worse  Hearing was rated as same  Patient feels that their emotional and mental health rating is same  Patients states they are never, rarely angry  Patient states they are often unusually tired/fatigued  Pain experienced in the last 7 days has been some  Patient's pain rating has been 8/10  Patient states that she has experienced no weight loss or gain in last 6 months  Depression Screening:   PHQ-2 Score: 2  PHQ-9 Score: 6      Fall Risk Screening: In the past year, patient has experienced: no history of falling in past year      Urinary Incontinence Screening:   Patient has not leaked urine accidently in the last six months  Home Safety:  Patient does not have trouble with stairs inside or outside of their home  Patient has working smoke alarms and has working carbon monoxide detector  Home safety hazards include: none  Nutrition:   Current diet is Regular  Medications:   Patient is currently taking over-the-counter supplements   OTC medications include: see medication list  Patient is able to manage medications  Activities of Daily Living (ADLs)/Instrumental Activities of Daily Living (IADLs):   Walk and transfer into and out of bed and chair?: Yes  Dress and groom yourself?: Yes    Bathe or shower yourself?: Yes    Feed yourself? Yes  Do your laundry/housekeeping?: Yes  Manage your money, pay your bills and track your expenses?: Yes  Make your own meals?: Yes    Do your own shopping?: Yes    Previous Hospitalizations:   Any hospitalizations or ED visits within the last 12 months?: No      Advance Care Planning:   Living will: Yes    Advanced directive: Yes      PREVENTIVE SCREENINGS      Cardiovascular Screening:    General: Screening Not Indicated and History Lipid Disorder      Diabetes Screening:     General: Risks and Benefits Discussed    Due for: Blood Glucose      Colorectal Cancer Screening:     General: Screening Current      Breast Cancer Screening:     General: Screening Current      Cervical Cancer Screening:    General: Screening Not Indicated      Osteoporosis Screening:    General: Screening Not Indicated      Abdominal Aortic Aneurysm (AAA) Screening:        General: Screening Not Indicated and History AAA      Lung Cancer Screening:     General: Screening Not Indicated      Hepatitis C Screening:    General: Screening Current    Screening, Brief Intervention, and Referral to Treatment (SBIRT)    Screening  Typical number of drinks in a day: 0  Typical number of drinks in a week: 0  Interpretation: Low risk drinking behavior      Single Item Drug Screening:  How often have you used an illegal drug (including marijuana) or a prescription medication for non-medical reasons in the past year? never    Single Item Drug Screen Score: 0  Interpretation: Negative screen for possible drug use disorder    Review of Current Opioid Use    Opioid Risk Tool (ORT) Interpretation: Complete Opioid Risk Tool (ORT)      Sean Bah MD

## 2021-06-04 NOTE — PROGRESS NOTES
Assessment/Plan:    No problem-specific Assessment & Plan notes found for this encounter  Diagnoses and all orders for this visit:    COPD with acute exacerbation ([facility] )  -     CBC and differential; Future  -     Comprehensive metabolic panel; Future  -     TSH, 3rd generation; Future  -     Lipid panel; Future    Essential hypertension  -     CBC and differential; Future  -     Comprehensive metabolic panel; Future  -     TSH, 3rd generation; Future  -     Lipid panel; Future    Atherosclerosis of abdominal aorta (HCC)  -     CBC and differential; Future  -     Comprehensive metabolic panel; Future  -     TSH, 3rd generation; Future  -     Lipid panel; Future    Pulmonary nodules  -     CBC and differential; Future  -     Comprehensive metabolic panel; Future  -     TSH, 3rd generation; Future  -     Lipid panel; Future    Encounter for screening for lung cancer  -     CT lung screening program; Future  -     CBC and differential; Future  -     Comprehensive metabolic panel; Future  -     TSH, 3rd generation; Future  -     Lipid panel; Future    Abdominal aortic aneurysm (AAA) without rupture ([facility] )  -     US abdominal aorta; Future  -     CBC and differential; Future  -     Comprehensive metabolic panel; Future  -     TSH, 3rd generation; Future  -     Lipid panel; Future    Thyroid nodule  -     US thyroid; Future  -     CBC and differential; Future  -     Comprehensive metabolic panel; Future  -     TSH, 3rd generation; Future  -     Lipid panel; Future    Moderate episode of recurrent major depressive disorder (HCC)  -     venlafaxine (EFFEXOR-XR) 150 mg 24 hr capsule; Take 1 capsule (150 mg total) by mouth daily with breakfast    Chronic pain syndrome  -     HYDROcodone-acetaminophen (NORCO)  mg per tablet; Take 1 tablet by mouth every 6 (six) hours as needed for moderate painMax Daily Amount: 4 tablets    Other orders  -     atenolol (TENORMIN) 25 mg tablet;  Take 50 mg by mouth daily December 17, 2019       Gerardo Price, Rita [address] [facility] [address]: [phone]      Patient: Jimmy Barfield   YOB: 1937   Date of Visit: 12/17/2019       Dear Dr. Cox President: Thank you for referring Sachin Latham to me for evaluation. Below are my notes for this visit with him. As you know he is a very interesting gentleman. He is ready to proceed with the cataract extraction in each eye. 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 If you have questions, please do not hesitate to call me. I look forward to following your patient along with you. Sincerely,        Federica Hood MD        CC: No Recipients  Vinnie Olmstead MD  12/17/2019 12:50 PM  Sign when Signing Visit  Referring provider: Arbie Peabody, OD  Primary eye provider: Arbie Peabody, OD  Primary care provider:Sherwin Sanders MD    Does Mr. Hunt have  today: Yes. Mr. Radha Garcia gave us verbal permission to discuss their medical condition in the presence of the following individual(s) in the room: Princess Galvins - wife     CHIEF COMPLAINT/HPI (Technician):   Here for cataract consultation and IOL measurements for BOTH EYES referred by Dr. Arbie Peabody, OD. Lenstar performed by Reyna Castro. Patient feels his right eye vision is worse than his left. He feels he can do all activities that he would like. HISTORY OF PRESENT ILLNESS (DOCTOR):  Mr. Radha Garcia is a 82year old patient was referred by Dr. Cox President for evaluation of cataracts in each eye. They have apparently been watching the cataracts for quite some time and now he feels ready to proceed. He she is a  and . He is concerned that he will not be able to pass the next vision test to keep his  license. He is noticing more fuzzy vision. He notes more difficulty with reading small print, for instance reading instructions.   He also notes he needs Subjective:      Patient ID: Teofilo Borrego is a 78 y o  female  She's been depressed for about 4 weeks  She has some stress at home  She had a loss of a loved one about the time it started  She would like to increase her Effexor  Her BP is at goal on her current regimen  She has no CP or SOB  She has no HA or vision changes  She has no PND or orthopnea  She smokes and has pulmonary nodules  She would like to have repeat CT scan  She has no cough or SOB  On previous lung imaging, it was noted that she has a 1 4 cm thyroid nodule  This has been stable for at least to years  She is due for both TSH and ultrasound  On previous imaging of the abdomen, she has a very small AAA  It is calcified there is no evidence of dissection  She has no back or abdominal pain  The following portions of the patient's history were reviewed and updated as appropriate:   She  has a past medical history of Elevated liver enzymes, Fracture of rib, HPV (human papilloma virus) infection, Osteoporosis, Pulmonary nodule, and Thyroid nodule    She   Patient Active Problem List    Diagnosis Date Noted    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 ([facility] ) 11/27/2020    Opioid dependence, uncomplicated ([facility] ) 79/92/3188    Recurrent major depressive disorder, in partial remission ([facility] ) 11/27/2020    Atherosclerosis of abdominal aorta ([facility] ) 11/27/2020    Clotting disorder ([facility] ) 02/21/2020    BMI 26 0-26 9,adult 05/10/2019    Smoker 05/10/2019    Medicare annual wellness visit, subsequent 05/10/2019    Chronic groin pain, right 12/19/2018    Thyroid nodule 10/16/2018    Chronic pain of right knee 10/16/2018    Pulmonary nodules 09/11/2018    Cytologic evidence of malignancy on smear of cervix 09/11/2018    Asymptomatic microscopic hematuria 06/11/2018    Rash of face 06/11/2018    Essential hypertension 03/22/2018    Other hyperlipidemia 03/22/2018    Screening for genitourinary condition more light to be able to read. The ROS and PFSH were reviewed with patient and I have updated these as appropriate. Also see Histories section of the EMR for a full display of this information. PAST OCULAR HISTORY:  Glaucoma suspect   Cysts right upper eye lid   Dermatochaslasis right eye   Astigmatism   Blepharitis     PAST OCULAR PROCEDURES:  RIGHT EYE:   None   LEFT EYE:   None     Eye Meds: NONE  Have you had prior Lasik surgery? No  Do you wear contact lenses? No  Do you take flomax? No  Are you diabetic? No  Any history of eye trauma? No    REVIEW OF SYSTEMS:  1. Do you have pain?  no  2. Do you have fever, chills, sweats or weight change? no  3. Do you have headaches or seizures? no  4. Do you have ringing in your ear/s or hearing loss? no  5. Do you have chest pain, palpitations or heart murmur? no  6. Do you have shortness of breath or wheezing? no  7. Do you have abdominal pain, nausea, vomiting, diarrhea or constipation? no  8. Do you have pain, frequency or difficulty with urination? no  9. Do you have joint or back pain? no  10. Do you have weakness, numbness or tingling? no  11. Do you have skin changes such as a rash? no  12. Do you experience easy bruising or bleeding? no  13.  Are you depressed? no        Base Eye Exam     Visual Acuity (Snellen - Linear)       Right Left    Dist cc 20/25 20/25 -2    Correction:  Glasses          Tonometry (Applanation, 10:17 AM)       Right Left    Pressure 11 10          Pupils       Pupils Dark Light Shape React APD    Right PERRL 4 3 Round Brisk Negative    Left PERRL 4 3 Round Brisk Negative          Visual Fields       Left Right     Full Full          Extraocular Movement       Right Left     Full Full          Neuro/Psych     Oriented x3:  Yes    Mood/Affect:  Normal          Dilation     Both eyes:  2.5% Phenylephrine / 1% Tropicamide @ 10:18 AM            Additional Tests     Glare Testing (BAT)       Medium High    Right 20/25 20/40 03/22/2018    Screening for neurological condition 03/22/2018    COPD with acute exacerbation ([facility] ) 12/13/2017    Hyperlipidemia 08/31/2015    Hypertension 08/31/2015    Inflammation of sacroiliac joint ([facility] ) 08/29/2015    Lumbar disc disease 08/18/2014    HTN, goal below 140/90 08/24/2011     She  has a past surgical history that includes Appendectomy; Back surgery; Cholecystectomy; Foot surgery; Total hip arthroplasty (Right); Total abdominal hysterectomy; Colposcopy; Colonoscopy w/ biopsies; and US guided thyroid biopsy  Her family history includes Diabetes type II in her mother; Heart disease in her father and mother  She  reports that she has been smoking cigarettes  She has a 35 00 pack-year smoking history  She has never used smokeless tobacco  She reports that she does not drink alcohol or use drugs    Current Outpatient Medications   Medication Sig Dispense Refill    albuterol (2 5 mg/3 mL) 0 083 % nebulizer solution Take 1 vial (2 5 mg total) by nebulization every 6 (six) hours as needed for wheezing or shortness of breath 120 vial 5    atenolol (TENORMIN) 25 mg tablet Take 50 mg by mouth daily      atenolol (TENORMIN) 50 mg tablet take 1 tablet by mouth once daily 90 tablet 3    azelastine (ASTELIN) 0 1 % nasal spray 1 spray into each nostril 2 (two) times a day Use in each nostril as directed 30 mL 5    COMBIVENT RESPIMAT inhaler Inhale 1 puff 4 (four) times a day 4 g 5    diazepam (VALIUM) 5 mg tablet Take 1 tablet (5 mg total) by mouth 3 (three) times a day as needed for anxiety 90 tablet 0    erythromycin (ILOTYCIN) ophthalmic ointment APPLY SMALL AMOUNT TO BILATERAL UPPER EYELIDS 4 TIMES DAILY  0    fluticasone (FLONASE) 50 mcg/act nasal spray instill 2 sprays into each nostril once daily 16 g 5    hydrochlorothiazide (MICROZIDE) 12 5 mg capsule Take 1 capsule (12 5 mg total) by mouth daily 90 capsule 3    HYDROcodone-acetaminophen (NORCO)  mg per tablet Take 1 tablet by mouth Left 20/30 20/50            Refraction     Wearing Rx       Sphere Cylinder Axis Add    Right +3.25 +1.00 042 +2.50ID     Left +3.00 +1.25 170 +2.25 ID     Age:  5 yrs     Type:  Progressive              Assessment and Plan:    1. Nuclear Sclerosis and Cortical Changes Both Eyes. The presence of cataracts was reviewed with the patient. The patient was advised that cataract surgery is elective and we discussed the natural prognosis of cataracts. At this point the cataract(s) is/are visually significant based on exam findings, I expect improvement in visual function with surgery, and there are multiple visual/lifestyle complaints interfering with activities of daily living as outlined above. This is affecting Both Eyes. His current visual function is not meeting his needs. I did caution him that we cannot guarantee he will be 20/20 in both eyes, however I do expect improvement in visual function in each eye. We discussed the risks, benefits, alternatives (including observation) and expectations with regards to the patient's surgery. These risks include but are not limited to infection, bleeding, inflammation, damage to or complication with any part of the eye can lead to loss of vision or blindness. It is also possible that complications could lead to the need for more surgery, which may not be performed by me in certain situations. In addition to the standard risks, we reviewed the risks and challenges associated with Flomax (or equivalent medication) and the potential for floppy iris syndrome. He voiced understanding and questions were answered to their satisfaction. Intraocular lens measurements for both eyes were reviewed with regard to symmetry for Keratometry and Axial Length, and with regard to present refraction. There is no significant anisometropia and they elect to target near plano Both Eyes.  After discussing premium implants including multifocal and astigmatism options they elect for the Standard implant (monofocal) Both Eyes (They understand this corrects for only one distance and does not correct for astigmatism). I discussed the lens implant options and refractive goals making clear that there are no guarantees about outcomes. They understand that it is not a refractive procedure. We will plan on topical anesthesia pending clearance and ultimately in consultation with the Anesthesia team on the day of surgery. The patient was given the opportunity to ask questions in regards to all of the above. The decision making process was discussed in detail and all questions were answered to their satisfaction. After our discussion the patient elected to proceed and the consent forms were signed. **OK to proceed with surgery: Either eye first.     Pupil After Dilation:   Right eye: 5.5   Left Eye: 5.5  As noted above he has taken Flomax sporadically. His pupils are borderline and there is a possibility he may need a Malyugin ring. We discussed the risks of that. We have elected for the following lens implant choices:    Right Eye Posterior Chamber Intraocular Lens:  ZCBOO +24.0 lens with a refractive goal of approximately (-0.48)     Astigmatism: + 1.05 D at 049 degrees    Left Eye Posterior Chamber Intraocular Lens:  ZCBOO +23.5 lens with a refractive goal of approximately (-0.33)     Astigmatism: + +1.09 D at 165 degrees    **They were given my Cataract handouts and postoperative instructions to review, these reflect our discussion and the details of surgery. I have asked them to not hesitate to call with any other questions and I or one of the staff would be happy to answer for them. 2. Mild retinal pigment epithelial changes, each eye. I do think this is of minimal significance at this time. I recommend observation.       COPY OF REPORT SENT TO REQUESTING PHYSICIAN--Emily Meyers, OD every 6 (six) hours as needed for moderate painMax Daily Amount: 4 tablets 120 tablet 0    Magnesium 500 MG TABS Take 1 tablet by mouth daily      STIOLTO RESPIMAT 2 5-2 5 MCG/ACT inhaler inhale 1 puff by mouth daily 12 g 5    venlafaxine (EFFEXOR-XR) 150 mg 24 hr capsule Take 1 capsule (150 mg total) by mouth daily with breakfast 30 capsule 5     No current facility-administered medications for this visit  Current Outpatient Medications on File Prior to Visit   Medication Sig    albuterol (2 5 mg/3 mL) 0 083 % nebulizer solution Take 1 vial (2 5 mg total) by nebulization every 6 (six) hours as needed for wheezing or shortness of breath    atenolol (TENORMIN) 25 mg tablet Take 50 mg by mouth daily    atenolol (TENORMIN) 50 mg tablet take 1 tablet by mouth once daily    azelastine (ASTELIN) 0 1 % nasal spray 1 spray into each nostril 2 (two) times a day Use in each nostril as directed    COMBIVENT RESPIMAT inhaler Inhale 1 puff 4 (four) times a day    diazepam (VALIUM) 5 mg tablet Take 1 tablet (5 mg total) by mouth 3 (three) times a day as needed for anxiety    erythromycin (ILOTYCIN) ophthalmic ointment APPLY SMALL AMOUNT TO BILATERAL UPPER EYELIDS 4 TIMES DAILY    fluticasone (FLONASE) 50 mcg/act nasal spray instill 2 sprays into each nostril once daily    hydrochlorothiazide (MICROZIDE) 12 5 mg capsule Take 1 capsule (12 5 mg total) by mouth daily    Magnesium 500 MG TABS Take 1 tablet by mouth daily    STIOLTO RESPIMAT 2 5-2 5 MCG/ACT inhaler inhale 1 puff by mouth daily    [DISCONTINUED] HYDROcodone-acetaminophen (NORCO)  mg per tablet Take 1 tablet by mouth every 6 (six) hours as needed for moderate painMax Daily Amount: 4 tablets    [DISCONTINUED] venlafaxine (EFFEXOR) 75 mg tablet Take 1 tablet (75 mg total) by mouth daily with breakfast     No current facility-administered medications on file prior to visit  She has No Known Allergies       Review of Systems   All other systems reviewed and are negative  Objective:      /88 (BP Location: Left arm, Patient Position: Sitting, Cuff Size: Standard)   Pulse 81   Temp 98 3 °F (36 8 °C)   Ht 5' 4" (1 626 m)   Wt 71 7 kg (158 lb)   SpO2 98%   BMI 27 12 kg/m²          Physical Exam  Vitals signs and nursing note reviewed  Constitutional:       Appearance: Normal appearance  She is normal weight  HENT:      Head: Normocephalic and atraumatic  Neck:      Musculoskeletal: Normal range of motion and neck supple  Cardiovascular:      Rate and Rhythm: Normal rate and regular rhythm  Pulses: Normal pulses  Heart sounds: Normal heart sounds  Pulmonary:      Effort: Pulmonary effort is normal       Breath sounds: Normal breath sounds  Abdominal:      General: Abdomen is flat  Bowel sounds are normal       Palpations: Abdomen is soft  Musculoskeletal: Normal range of motion  Skin:     General: Skin is warm and dry  Capillary Refill: Capillary refill takes less than 2 seconds  Neurological:      General: No focal deficit present  Mental Status: She is alert and oriented to person, place, and time  Mental status is at baseline  Psychiatric:         Mood and Affect: Mood normal          Behavior: Behavior normal          Thought Content:  Thought content normal          Judgment: Judgment normal

## 2021-06-23 ENCOUNTER — TELEPHONE (OUTPATIENT)
Dept: FAMILY MEDICINE CLINIC | Facility: CLINIC | Age: 76
End: 2021-06-23

## 2021-06-23 DIAGNOSIS — F33.1 MODERATE EPISODE OF RECURRENT MAJOR DEPRESSIVE DISORDER (HCC): Primary | ICD-10-CM

## 2021-06-23 RX ORDER — VENLAFAXINE HYDROCHLORIDE 75 MG/1
75 CAPSULE, EXTENDED RELEASE ORAL
Qty: 30 CAPSULE | Refills: 5 | Status: SHIPPED | OUTPATIENT
Start: 2021-06-23 | End: 2021-12-20

## 2021-06-23 NOTE — TELEPHONE ENCOUNTER
Pt called and said the new dose of Effexor is giving her side effects and would like to go back on the original dose of 75mg

## 2021-06-30 DIAGNOSIS — F41.9 ANXIETY: ICD-10-CM

## 2021-06-30 RX ORDER — DIAZEPAM 5 MG/1
5 TABLET ORAL 3 TIMES DAILY PRN
Qty: 90 TABLET | Refills: 0 | Status: SHIPPED | OUTPATIENT
Start: 2021-06-30 | End: 2021-08-04 | Stop reason: SDUPTHER

## 2021-07-02 ENCOUNTER — TELEPHONE (OUTPATIENT)
Dept: FAMILY MEDICINE CLINIC | Facility: CLINIC | Age: 76
End: 2021-07-02

## 2021-07-08 DIAGNOSIS — G89.4 CHRONIC PAIN SYNDROME: ICD-10-CM

## 2021-07-09 RX ORDER — HYDROCODONE BITARTRATE AND ACETAMINOPHEN 10; 325 MG/1; MG/1
1 TABLET ORAL EVERY 6 HOURS PRN
Qty: 120 TABLET | Refills: 0 | OUTPATIENT
Start: 2021-07-09

## 2021-07-09 RX ORDER — HYDROCODONE BITARTRATE AND ACETAMINOPHEN 10; 325 MG/1; MG/1
TABLET ORAL
Qty: 120 TABLET | Refills: 0 | Status: SHIPPED | OUTPATIENT
Start: 2021-07-09 | End: 2021-08-13 | Stop reason: SDUPTHER

## 2021-07-22 ENCOUNTER — TELEPHONE (OUTPATIENT)
Dept: FAMILY MEDICINE CLINIC | Facility: CLINIC | Age: 76
End: 2021-07-22

## 2021-07-22 NOTE — TELEPHONE ENCOUNTER
Patient is calling upset that she has not heard the results of her testing that was done on 7/1 at [facility]   Results are in her Care Everywhere    Please advise

## 2021-08-04 ENCOUNTER — OFFICE VISIT (OUTPATIENT)
Dept: FAMILY MEDICINE CLINIC | Facility: CLINIC | Age: 76
End: 2021-08-04
Payer: COMMERCIAL

## 2021-08-04 VITALS
WEIGHT: 161 LBS | OXYGEN SATURATION: 98 % | DIASTOLIC BLOOD PRESSURE: 74 MMHG | BODY MASS INDEX: 27.49 KG/M2 | TEMPERATURE: 97.7 F | SYSTOLIC BLOOD PRESSURE: 124 MMHG | HEIGHT: 64 IN | HEART RATE: 78 BPM

## 2021-08-04 DIAGNOSIS — I71.4 ABDOMINAL AORTIC ANEURYSM (AAA) WITHOUT RUPTURE (HCC): ICD-10-CM

## 2021-08-04 DIAGNOSIS — R31.9 PAINLESS HEMATURIA: ICD-10-CM

## 2021-08-04 DIAGNOSIS — G89.4 CHRONIC PAIN SYNDROME: Primary | ICD-10-CM

## 2021-08-04 DIAGNOSIS — J44.1 COPD WITH ACUTE EXACERBATION (HCC): ICD-10-CM

## 2021-08-04 DIAGNOSIS — F41.9 ANXIETY: ICD-10-CM

## 2021-08-04 DIAGNOSIS — F17.200 TOBACCO DEPENDENCY: ICD-10-CM

## 2021-08-04 DIAGNOSIS — I10 ESSENTIAL HYPERTENSION: ICD-10-CM

## 2021-08-04 LAB
SL AMB  POCT GLUCOSE, UA: NEGATIVE
SL AMB LEUKOCYTE ESTERASE,UA: ABNORMAL
SL AMB POCT BILIRUBIN,UA: NEGATIVE
SL AMB POCT BLOOD,UA: ABNORMAL
SL AMB POCT CLARITY,UA: ABNORMAL
SL AMB POCT COLOR,UA: ABNORMAL
SL AMB POCT KETONES,UA: ABNORMAL
SL AMB POCT NITRITE,UA: NEGATIVE
SL AMB POCT PH,UA: 5
SL AMB POCT SPECIFIC GRAVITY,UA: 1
SL AMB POCT URINE PROTEIN: ABNORMAL
SL AMB POCT UROBILINOGEN: NORMAL

## 2021-08-04 PROCEDURE — 3008F BODY MASS INDEX DOCD: CPT | Performed by: FAMILY MEDICINE

## 2021-08-04 PROCEDURE — 81002 URINALYSIS NONAUTO W/O SCOPE: CPT | Performed by: FAMILY MEDICINE

## 2021-08-04 PROCEDURE — 99213 OFFICE O/P EST LOW 20 MIN: CPT | Performed by: FAMILY MEDICINE

## 2021-08-04 RX ORDER — DIAZEPAM 5 MG/1
5 TABLET ORAL 3 TIMES DAILY PRN
Qty: 90 TABLET | Refills: 0 | Status: SHIPPED | OUTPATIENT
Start: 2021-08-04 | End: 2021-09-10 | Stop reason: SDUPTHER

## 2021-08-04 NOTE — PROGRESS NOTES
Tobacco Cessation Counseling:  The patient is sincerely urged to quit consumption of tobacco  She is not ready to quit tobacco

## 2021-08-04 NOTE — PROGRESS NOTES
Assessment/Plan:     Problem List Items Addressed This Visit        Respiratory    COPD with acute exacerbation ([address] )       Cardiovascular and Mediastinum    Essential hypertension      Other Visit Diagnoses     Chronic pain syndrome    -  Primary    Abdominal aortic aneurysm (AAA) without rupture (HCC)        Tobacco dependency        Painless hematuria        Relevant Orders    POCT urine dip (Completed)    CT abdomen pelvis w wo contrast    Comprehensive metabolic panel            14-year-old female with a past medical history of hypertension, COPD, AAA presents to the office today due to symptoms of hematuria  Patient denies any other associated symptoms including dysuria, fevers, chills  Patient will be given a poc urinalysis and also given a script for CT of the abdomen and pelvis  Analysis was negative for UTI  Patient is a follow-up after imaging study results  Subjective:      Patient ID: Brenda Begum is a 78 y o  female past medical history of COPD, hypertension, triple A presents to office today with symptoms of hematuria  Patient indicates that symptoms have been ongoing for the past week  Patient indicates that has been persistent  Patient denies dysuria, vaginal discharge, her urgency, frequency fever, chills, night sweats  Patient has significant smoking history of a pack a day for tobacco   Patient is a smoker for 60 years  Patient denies recreational drug use and alcohol use  HPI    The following portions of the patient's history were reviewed and updated as appropriate: allergies, current medications, past family history, past medical history, past social history, past surgical history and problem list     Review of Systems   Constitutional: Negative for chills and fever  HENT: Negative for ear pain and sore throat  Eyes: Negative for pain and visual disturbance  Respiratory: Negative for cough and shortness of breath      Cardiovascular: Negative for chest pain and palpitations  Gastrointestinal: Negative for abdominal pain and vomiting  Genitourinary: Positive for hematuria  Negative for decreased urine volume, dysuria, frequency, pelvic pain and urgency  Musculoskeletal: Negative for arthralgias and back pain  Skin: Negative for color change and rash  Neurological: Negative for seizures and syncope  Hematological: Negative  Psychiatric/Behavioral: Negative  All other systems reviewed and are negative  Objective:    /74   Pulse 78   Temp 97 7 °F (36 5 °C)   Ht 5' 4" (1 626 m)   Wt 73 kg (161 lb)   SpO2 98%   BMI 27 64 kg/m²        Physical Exam  Constitutional:       Appearance: Normal appearance  HENT:      Head: Normocephalic and atraumatic  Cardiovascular:      Rate and Rhythm: Normal rate and regular rhythm  Pulses: Normal pulses  Heart sounds: Normal heart sounds  Pulmonary:      Effort: Pulmonary effort is normal       Breath sounds: Normal breath sounds  Abdominal:      General: Abdomen is flat  Bowel sounds are normal       Palpations: Abdomen is soft  Musculoskeletal:         General: No swelling or tenderness  Normal range of motion  Skin:     General: Skin is warm  Capillary Refill: Capillary refill takes less than 2 seconds  Neurological:      General: No focal deficit present  Mental Status: She is alert and oriented to person, place, and time     Psychiatric:         Mood and Affect: Mood normal          Behavior: Behavior normal

## 2021-08-11 ENCOUNTER — HOSPITAL ENCOUNTER (OUTPATIENT)
Dept: CT IMAGING | Facility: HOSPITAL | Age: 76
Discharge: HOME | End: 2021-08-11
Payer: COMMERCIAL

## 2021-08-11 DIAGNOSIS — R31.9 PAINLESS HEMATURIA: ICD-10-CM

## 2021-08-11 PROCEDURE — 74178 CT ABD&PLV WO CNTR FLWD CNTR: CPT

## 2021-08-11 PROCEDURE — G1004 CDSM NDSC: HCPCS

## 2021-08-11 RX ADMIN — IOHEXOL 100 ML: 350 INJECTION, SOLUTION INTRAVENOUS at 10:32

## 2021-08-13 DIAGNOSIS — G89.4 CHRONIC PAIN SYNDROME: ICD-10-CM

## 2021-08-13 RX ORDER — HYDROCODONE BITARTRATE AND ACETAMINOPHEN 10; 325 MG/1; MG/1
TABLET ORAL
Qty: 120 TABLET | Refills: 0 | Status: SHIPPED | OUTPATIENT
Start: 2021-08-13 | End: 2021-09-20 | Stop reason: SDUPTHER

## 2021-08-18 ENCOUNTER — OFFICE VISIT (OUTPATIENT)
Dept: FAMILY MEDICINE CLINIC | Facility: CLINIC | Age: 76
End: 2021-08-18
Payer: COMMERCIAL

## 2021-08-18 VITALS
WEIGHT: 160 LBS | HEART RATE: 86 BPM | BODY MASS INDEX: 27.31 KG/M2 | HEIGHT: 64 IN | OXYGEN SATURATION: 98 % | DIASTOLIC BLOOD PRESSURE: 72 MMHG | TEMPERATURE: 97.7 F | SYSTOLIC BLOOD PRESSURE: 132 MMHG

## 2021-08-18 DIAGNOSIS — R31.9 PAINLESS HEMATURIA: ICD-10-CM

## 2021-08-18 DIAGNOSIS — Z12.31 ENCOUNTER FOR SCREENING MAMMOGRAM FOR BREAST CANCER: Primary | ICD-10-CM

## 2021-08-18 DIAGNOSIS — I10 ESSENTIAL HYPERTENSION: ICD-10-CM

## 2021-08-18 DIAGNOSIS — I71.4 ABDOMINAL AORTIC ANEURYSM (AAA) WITHOUT RUPTURE (HCC): ICD-10-CM

## 2021-08-18 DIAGNOSIS — J44.1 COPD WITH ACUTE EXACERBATION (HCC): ICD-10-CM

## 2021-08-18 PROCEDURE — 99213 OFFICE O/P EST LOW 20 MIN: CPT | Performed by: FAMILY MEDICINE

## 2021-08-18 RX ORDER — PREDNISOLONE ACETATE 10 MG/ML
SUSPENSION/ DROPS OPHTHALMIC
COMMUNITY
Start: 2021-08-06 | End: 2022-06-13 | Stop reason: ALTCHOICE

## 2021-08-18 NOTE — PROGRESS NOTES
Assessment/Plan:     Problem List Items Addressed This Visit        Respiratory    COPD with acute exacerbation [facility])       Cardiovascular and Mediastinum    Essential hypertension      Other Visit Diagnoses     Encounter for screening mammogram for breast cancer    -  Primary    Relevant Orders    Mammo screening bilateral w 3d & cad    Abdominal aortic aneurysm (AAA) without rupture ([address] )        Painless hematuria        Relevant Orders    Ambulatory referral to Urology            42-year-old pleasant female high with past medical history of COPD and hypertension presents for further follow-up after symptoms of painless hematuria  Point of care urine dip was evident of +3 blood but no signs of UTI  Patient has CMP which was benign along with CT of the abdomen and pelvis showing renal cysts and nonobstructing calculi in the right kidney these results are not new and is similar to past CT of the abdomen results  Patient is still complains of hematuria which comes and goes denies any other associated symptoms like fevers chills night sweats dysuria or discharge  Patient will be given a referral to see Urology whom might then endorse further workup with a cystoscopy  Subjective:      Patient ID: Gilmer Triplett is a 78 y o  female with a past medical history of COPD, hypertension, AAA presents to office today for follow-up visit  Patient presented with signs of hematuria 2 weeks ago  Patient was given a urinalysis which was negative for UTI  Additionally she was to complete a CMP and get a CT of the abdomen and pelvis  Patient CMP came back normal   CT of the abdomen and pelvis showed renal cysts with nonobstructing calculi in the right kidney  These results are similar to past imaging studies  Patient still complains of blood and urine for the past few weeks  No discharge, dysuria, fevers, chills, night sweats      The following portions of the patient's history were reviewed and updated as appropriate: allergies, current medications, past family history, past medical history, past social history, past surgical history and problem list     Review of Systems   Constitutional: Negative for chills and fever  HENT: Negative for ear pain and sore throat  Eyes: Negative for pain and visual disturbance  Respiratory: Negative for cough and shortness of breath  Cardiovascular: Negative for chest pain and palpitations  Gastrointestinal: Negative for abdominal pain and vomiting  Genitourinary: Positive for hematuria  Negative for dysuria  Musculoskeletal: Negative for arthralgias and back pain  Skin: Negative for color change and rash  Neurological: Negative for seizures and syncope  All other systems reviewed and are negative  Objective:    /72   Pulse 86   Temp 97 7 °F (36 5 °C)   Ht 5' 4" (1 626 m)   Wt 72 6 kg (160 lb)   SpO2 98%   BMI 27 46 kg/m²      Physical Exam  Constitutional:       Appearance: Normal appearance  HENT:      Head: Normocephalic and atraumatic  Cardiovascular:      Rate and Rhythm: Normal rate and regular rhythm  Pulses: Normal pulses  Heart sounds: Normal heart sounds  Pulmonary:      Effort: Pulmonary effort is normal       Breath sounds: Normal breath sounds  Abdominal:      General: Abdomen is flat  Bowel sounds are normal       Palpations: Abdomen is soft  Musculoskeletal:         General: No swelling, tenderness, deformity or signs of injury  Normal range of motion  Skin:     General: Skin is warm  Capillary Refill: Capillary refill takes less than 2 seconds  Neurological:      General: No focal deficit present  Mental Status: She is alert and oriented to person, place, and time     Psychiatric:         Mood and Affect: Mood normal          Behavior: Behavior normal

## 2021-08-23 DIAGNOSIS — J44.9 CHRONIC OBSTRUCTIVE PULMONARY DISEASE, UNSPECIFIED COPD TYPE (HCC): ICD-10-CM

## 2021-08-23 RX ORDER — TIOTROPIUM BROMIDE AND OLODATEROL 3.124; 2.736 UG/1; UG/1
SPRAY, METERED RESPIRATORY (INHALATION)
Qty: 12 G | Refills: 5 | Status: SHIPPED | OUTPATIENT
Start: 2021-08-23

## 2021-09-10 ENCOUNTER — NURSE TRIAGE (OUTPATIENT)
Dept: OTHER | Facility: OTHER | Age: 76
End: 2021-09-10

## 2021-09-10 DIAGNOSIS — F41.9 ANXIETY: ICD-10-CM

## 2021-09-11 NOTE — TELEPHONE ENCOUNTER
Reason for Disposition   Prescription refill request for a CONTROLLED substance (e g , narcotics, ADHD medicines)    Answer Assessment - Initial Assessment Questions  1  NAME of MEDICATION: "What medicine are you calling about?"      Valium     2  QUESTION: "What is your question?" (e g , medication refill, side effect)      States she called 2 days ago for refill and it was not there and called again today and it is not there still     3  PRESCRIBING HCP: "Who prescribed it?" Reason: if prescribed by specialist, call should be referred to that group        Dr Lonnie Bahena    Protocols used: MEDICATION QUESTION CALL-ADULT-

## 2021-09-11 NOTE — TELEPHONE ENCOUNTER
Regarding: Medication not called in   ----- Message from Gloria Robbins sent at 9/10/2021  5:01 PM EDT -----  " I had call in to my doctor office for a refill of my medication they told me they were going to order my refill   I went to the pharmacy to  my medication I was told that my medication refill was not call in "

## 2021-09-13 RX ORDER — DIAZEPAM 5 MG/1
5 TABLET ORAL 3 TIMES DAILY PRN
Qty: 90 TABLET | Refills: 0 | Status: SHIPPED | OUTPATIENT
Start: 2021-09-13 | End: 2021-10-25 | Stop reason: SDUPTHER

## 2021-09-20 DIAGNOSIS — I10 ESSENTIAL HYPERTENSION: Primary | ICD-10-CM

## 2021-09-20 DIAGNOSIS — G89.4 CHRONIC PAIN SYNDROME: ICD-10-CM

## 2021-09-21 RX ORDER — ATENOLOL 25 MG/1
50 TABLET ORAL DAILY
Qty: 180 TABLET | Refills: 3 | Status: SHIPPED | OUTPATIENT
Start: 2021-09-21

## 2021-09-21 RX ORDER — HYDROCODONE BITARTRATE AND ACETAMINOPHEN 10; 325 MG/1; MG/1
TABLET ORAL
Qty: 120 TABLET | Refills: 0 | Status: SHIPPED | OUTPATIENT
Start: 2021-09-21 | End: 2021-10-25 | Stop reason: SDUPTHER

## 2021-10-25 DIAGNOSIS — G89.4 CHRONIC PAIN SYNDROME: ICD-10-CM

## 2021-10-25 DIAGNOSIS — F17.200 TOBACCO DEPENDENCY: ICD-10-CM

## 2021-10-25 DIAGNOSIS — F41.9 ANXIETY: ICD-10-CM

## 2021-10-25 RX ORDER — DIAZEPAM 5 MG/1
5 TABLET ORAL 3 TIMES DAILY PRN
Qty: 90 TABLET | Refills: 0 | Status: SHIPPED | OUTPATIENT
Start: 2021-10-25 | End: 2021-11-29 | Stop reason: SDUPTHER

## 2021-10-25 RX ORDER — IPRATROPIUM/ALBUTEROL SULFATE 20-100 MCG
1 MIST INHALER (GRAM) INHALATION 4 TIMES DAILY
Qty: 4 G | Refills: 5 | Status: SHIPPED | OUTPATIENT
Start: 2021-10-25

## 2021-10-25 RX ORDER — HYDROCODONE BITARTRATE AND ACETAMINOPHEN 10; 325 MG/1; MG/1
TABLET ORAL
Qty: 120 TABLET | Refills: 0 | Status: SHIPPED | OUTPATIENT
Start: 2021-10-25 | End: 2021-11-29 | Stop reason: SDUPTHER

## 2021-11-01 ENCOUNTER — RA CDI HCC (OUTPATIENT)
Dept: OTHER | Facility: HOSPITAL | Age: 76
End: 2021-11-01

## 2021-11-22 ENCOUNTER — RA CDI HCC (OUTPATIENT)
Dept: OTHER | Facility: HOSPITAL | Age: 76
End: 2021-11-22

## 2021-11-29 ENCOUNTER — OFFICE VISIT (OUTPATIENT)
Dept: FAMILY MEDICINE CLINIC | Facility: CLINIC | Age: 76
End: 2021-11-29
Payer: COMMERCIAL

## 2021-11-29 VITALS
BODY MASS INDEX: 26.98 KG/M2 | HEIGHT: 64 IN | DIASTOLIC BLOOD PRESSURE: 74 MMHG | HEART RATE: 84 BPM | WEIGHT: 158 LBS | SYSTOLIC BLOOD PRESSURE: 116 MMHG | OXYGEN SATURATION: 98 % | TEMPERATURE: 97.7 F

## 2021-11-29 DIAGNOSIS — Z12.31 SCREENING MAMMOGRAM FOR BREAST CANCER: ICD-10-CM

## 2021-11-29 DIAGNOSIS — R06.00 DOE (DYSPNEA ON EXERTION): ICD-10-CM

## 2021-11-29 DIAGNOSIS — M46.1 INFLAMMATION OF SACROILIAC JOINT (HCC): ICD-10-CM

## 2021-11-29 DIAGNOSIS — F41.9 ANXIETY: ICD-10-CM

## 2021-11-29 DIAGNOSIS — G89.4 CHRONIC PAIN SYNDROME: ICD-10-CM

## 2021-11-29 DIAGNOSIS — M48.061 SPINAL STENOSIS OF LUMBAR REGION WITHOUT NEUROGENIC CLAUDICATION: ICD-10-CM

## 2021-11-29 DIAGNOSIS — Z23 NEEDS FLU SHOT: Primary | ICD-10-CM

## 2021-11-29 DIAGNOSIS — D68.9 CLOTTING DISORDER (HCC): ICD-10-CM

## 2021-11-29 DIAGNOSIS — F11.20 OPIOID DEPENDENCE, UNCOMPLICATED (HCC): ICD-10-CM

## 2021-11-29 DIAGNOSIS — F02.80 SEMANTIC DEMENTIA WITHOUT BEHAVIORAL DISTURBANCE (HCC): ICD-10-CM

## 2021-11-29 DIAGNOSIS — I71.4 ABDOMINAL AORTIC ANEURYSM (AAA) WITHOUT RUPTURE (HCC): ICD-10-CM

## 2021-11-29 DIAGNOSIS — G31.09 SEMANTIC DEMENTIA WITHOUT BEHAVIORAL DISTURBANCE (HCC): ICD-10-CM

## 2021-11-29 DIAGNOSIS — C53.9 MALIGNANT NEOPLASM OF CERVIX, UNSPECIFIED SITE (HCC): ICD-10-CM

## 2021-11-29 PROBLEM — Z28.21 INFLUENZA VACCINE REFUSED: Status: ACTIVE | Noted: 2021-11-29

## 2021-11-29 PROBLEM — J43.2 CENTRILOBULAR EMPHYSEMA (HCC): Status: ACTIVE | Noted: 2021-09-28

## 2021-11-29 PROCEDURE — 99214 OFFICE O/P EST MOD 30 MIN: CPT | Performed by: FAMILY MEDICINE

## 2021-11-29 RX ORDER — ROSUVASTATIN CALCIUM 20 MG/1
20 TABLET, COATED ORAL DAILY
Qty: 30 TABLET | Refills: 5 | Status: SHIPPED | OUTPATIENT
Start: 2021-11-29 | End: 2022-01-07

## 2021-11-29 RX ORDER — DIAZEPAM 5 MG/1
5 TABLET ORAL 3 TIMES DAILY PRN
Qty: 90 TABLET | Refills: 5 | Status: SHIPPED | OUTPATIENT
Start: 2021-11-29 | End: 2022-06-10

## 2021-11-29 RX ORDER — HYDROCODONE BITARTRATE AND ACETAMINOPHEN 10; 325 MG/1; MG/1
TABLET ORAL
Qty: 120 TABLET | Refills: 0 | Status: SHIPPED | OUTPATIENT
Start: 2021-11-29 | End: 2022-01-07

## 2021-12-19 DIAGNOSIS — F33.1 MODERATE EPISODE OF RECURRENT MAJOR DEPRESSIVE DISORDER (HCC): ICD-10-CM

## 2021-12-20 RX ORDER — VENLAFAXINE HYDROCHLORIDE 75 MG/1
CAPSULE, EXTENDED RELEASE ORAL
Qty: 30 CAPSULE | Refills: 5 | Status: SHIPPED | OUTPATIENT
Start: 2021-12-20 | End: 2021-12-27

## 2021-12-24 DIAGNOSIS — F33.1 MODERATE EPISODE OF RECURRENT MAJOR DEPRESSIVE DISORDER (HCC): ICD-10-CM

## 2021-12-27 RX ORDER — VENLAFAXINE HYDROCHLORIDE 75 MG/1
CAPSULE, EXTENDED RELEASE ORAL
Qty: 30 CAPSULE | Refills: 5 | Status: SHIPPED | OUTPATIENT
Start: 2021-12-27

## 2022-01-06 ENCOUNTER — RA CDI HCC (OUTPATIENT)
Dept: OTHER | Facility: HOSPITAL | Age: 77
End: 2022-01-06

## 2022-01-07 ENCOUNTER — OFFICE VISIT (OUTPATIENT)
Dept: FAMILY MEDICINE CLINIC | Facility: CLINIC | Age: 77
End: 2022-01-07
Payer: COMMERCIAL

## 2022-01-07 VITALS
HEIGHT: 64 IN | OXYGEN SATURATION: 98 % | BODY MASS INDEX: 26.98 KG/M2 | DIASTOLIC BLOOD PRESSURE: 74 MMHG | TEMPERATURE: 97.8 F | HEART RATE: 88 BPM | WEIGHT: 158 LBS | SYSTOLIC BLOOD PRESSURE: 138 MMHG

## 2022-01-07 DIAGNOSIS — G31.09 SEMANTIC DEMENTIA WITHOUT BEHAVIORAL DISTURBANCE (HCC): ICD-10-CM

## 2022-01-07 DIAGNOSIS — C53.9 MALIGNANT NEOPLASM OF CERVIX, UNSPECIFIED SITE (HCC): ICD-10-CM

## 2022-01-07 DIAGNOSIS — F02.80 SEMANTIC DEMENTIA WITHOUT BEHAVIORAL DISTURBANCE (HCC): ICD-10-CM

## 2022-01-07 DIAGNOSIS — M54.41 BILATERAL LOW BACK PAIN WITH BILATERAL SCIATICA, UNSPECIFIED CHRONICITY: ICD-10-CM

## 2022-01-07 DIAGNOSIS — J43.2 CENTRILOBULAR EMPHYSEMA (HCC): ICD-10-CM

## 2022-01-07 DIAGNOSIS — F11.20 OPIOID DEPENDENCE, UNCOMPLICATED (HCC): ICD-10-CM

## 2022-01-07 DIAGNOSIS — Z12.2 ENCOUNTER FOR SCREENING FOR LUNG CANCER: ICD-10-CM

## 2022-01-07 DIAGNOSIS — M46.1 INFLAMMATION OF SACROILIAC JOINT (HCC): ICD-10-CM

## 2022-01-07 DIAGNOSIS — M54.42 BILATERAL LOW BACK PAIN WITH BILATERAL SCIATICA, UNSPECIFIED CHRONICITY: ICD-10-CM

## 2022-01-07 DIAGNOSIS — F33.41 RECURRENT MAJOR DEPRESSIVE DISORDER, IN PARTIAL REMISSION (HCC): ICD-10-CM

## 2022-01-07 DIAGNOSIS — Z87.891 PERSONAL HISTORY OF NICOTINE DEPENDENCE: ICD-10-CM

## 2022-01-07 DIAGNOSIS — Z13.820 SCREENING FOR OSTEOPOROSIS: ICD-10-CM

## 2022-01-07 DIAGNOSIS — Z23 IMMUNIZATION DUE: Primary | ICD-10-CM

## 2022-01-07 DIAGNOSIS — D68.9 CLOTTING DISORDER (HCC): ICD-10-CM

## 2022-01-07 DIAGNOSIS — I71.4 ABDOMINAL AORTIC ANEURYSM (AAA) WITHOUT RUPTURE (HCC): ICD-10-CM

## 2022-01-07 PROCEDURE — 4004F PT TOBACCO SCREEN RCVD TLK: CPT | Performed by: FAMILY MEDICINE

## 2022-01-07 PROCEDURE — 3008F BODY MASS INDEX DOCD: CPT | Performed by: FAMILY MEDICINE

## 2022-01-07 PROCEDURE — 99214 OFFICE O/P EST MOD 30 MIN: CPT | Performed by: FAMILY MEDICINE

## 2022-01-07 PROCEDURE — 3078F DIAST BP <80 MM HG: CPT | Performed by: FAMILY MEDICINE

## 2022-01-07 PROCEDURE — 1160F RVW MEDS BY RX/DR IN RCRD: CPT | Performed by: FAMILY MEDICINE

## 2022-01-07 PROCEDURE — 3075F SYST BP GE 130 - 139MM HG: CPT | Performed by: FAMILY MEDICINE

## 2022-01-07 RX ORDER — AZELASTINE 1 MG/ML
1 SPRAY, METERED NASAL 2 TIMES DAILY
Qty: 30 ML | Refills: 5 | Status: SHIPPED | OUTPATIENT
Start: 2022-01-07 | End: 2022-01-07

## 2022-01-07 RX ORDER — MAGNESIUM OXIDE/MAG AA CHELATE 133 MG
1 TABLET ORAL 2 TIMES DAILY
COMMUNITY

## 2022-01-07 RX ORDER — ATORVASTATIN CALCIUM 40 MG/1
40 TABLET, FILM COATED ORAL DAILY
Qty: 90 TABLET | Refills: 3 | Status: SHIPPED | OUTPATIENT
Start: 2022-01-07

## 2022-01-07 RX ORDER — ERGOCALCIFEROL 1.25 MG/1
50000 CAPSULE ORAL WEEKLY
Qty: 4 CAPSULE | Refills: 5 | Status: SHIPPED | OUTPATIENT
Start: 2022-01-07 | End: 2022-06-13 | Stop reason: ALTCHOICE

## 2022-01-07 RX ORDER — OXYCODONE AND ACETAMINOPHEN 10; 325 MG/1; MG/1
1 TABLET ORAL EVERY 6 HOURS PRN
Qty: 120 TABLET | Refills: 0 | Status: SHIPPED | OUTPATIENT
Start: 2022-01-07 | End: 2022-02-18 | Stop reason: SDUPTHER

## 2022-01-07 RX ORDER — OMEGA-3S/DHA/EPA/FISH OIL/D3 300MG-1000
400 CAPSULE ORAL DAILY
COMMUNITY

## 2022-01-07 RX ORDER — RIBOFLAVIN (VITAMIN B2) 100 MG
100 TABLET ORAL DAILY
COMMUNITY

## 2022-01-07 NOTE — PROGRESS NOTES
Assessment/Plan:    No problem-specific Assessment & Plan notes found for this encounter  Diagnoses and all orders for this visit:    Immunization due  -     Discontinue: azelastine (ASTELIN) 0 1 % nasal spray; 1 spray into each nostril 2 (two) times a day Use in each nostril as directed  -     atorvastatin (LIPITOR) 40 mg tablet; Take 1 tablet (40 mg total) by mouth daily  -     Lipid panel; Future  -     Comprehensive metabolic panel; Future  -     TSH, 3rd generation; Future    Encounter for screening for lung cancer  -     CT lung screening program; Future  -     Discontinue: azelastine (ASTELIN) 0 1 % nasal spray; 1 spray into each nostril 2 (two) times a day Use in each nostril as directed  -     atorvastatin (LIPITOR) 40 mg tablet; Take 1 tablet (40 mg total) by mouth daily  -     Lipid panel; Future  -     Comprehensive metabolic panel; Future  -     TSH, 3rd generation; Future    Personal history of nicotine dependence  -     CT lung screening program; Future  -     Discontinue: azelastine (ASTELIN) 0 1 % nasal spray; 1 spray into each nostril 2 (two) times a day Use in each nostril as directed  -     atorvastatin (LIPITOR) 40 mg tablet; Take 1 tablet (40 mg total) by mouth daily  -     Lipid panel; Future  -     Comprehensive metabolic panel; Future  -     TSH, 3rd generation; Future    Bilateral low back pain with bilateral sciatica, unspecified chronicity  -     ergocalciferol (VITAMIN D2) 50,000 units; Take 1 capsule (50,000 Units total) by mouth once a week  -     oxyCODONE-acetaminophen (Percocet)  mg per tablet; Take 1 tablet by mouth every 6 (six) hours as needed for moderate pain Max Daily Amount: 4 tablets  -     Ambulatory referral to Pain Management; Future  -     Discontinue: azelastine (ASTELIN) 0 1 % nasal spray; 1 spray into each nostril 2 (two) times a day Use in each nostril as directed  -     atorvastatin (LIPITOR) 40 mg tablet;  Take 1 tablet (40 mg total) by mouth daily  - Lipid panel; Future  -     Comprehensive metabolic panel; Future  -     TSH, 3rd generation; Future    Inflammation of sacroiliac joint (HCC)  -     Discontinue: azelastine (ASTELIN) 0 1 % nasal spray; 1 spray into each nostril 2 (two) times a day Use in each nostril as directed  -     atorvastatin (LIPITOR) 40 mg tablet; Take 1 tablet (40 mg total) by mouth daily  -     Lipid panel; Future  -     Comprehensive metabolic panel; Future  -     TSH, 3rd generation; Future    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 (HCC)  -     Discontinue: azelastine (ASTELIN) 0 1 % nasal spray; 1 spray into each nostril 2 (two) times a day Use in each nostril as directed  -     atorvastatin (LIPITOR) 40 mg tablet; Take 1 tablet (40 mg total) by mouth daily  -     Lipid panel; Future  -     Comprehensive metabolic panel; Future  -     TSH, 3rd generation; Future    Centrilobular emphysema (HCC)  -     Discontinue: azelastine (ASTELIN) 0 1 % nasal spray; 1 spray into each nostril 2 (two) times a day Use in each nostril as directed  -     atorvastatin (LIPITOR) 40 mg tablet; Take 1 tablet (40 mg total) by mouth daily  -     Lipid panel; Future  -     Comprehensive metabolic panel; Future  -     TSH, 3rd generation; Future    Opioid dependence, uncomplicated (HCC)  -     Discontinue: azelastine (ASTELIN) 0 1 % nasal spray; 1 spray into each nostril 2 (two) times a day Use in each nostril as directed  -     atorvastatin (LIPITOR) 40 mg tablet; Take 1 tablet (40 mg total) by mouth daily  -     Lipid panel; Future  -     Comprehensive metabolic panel; Future  -     TSH, 3rd generation; Future    Recurrent major depressive disorder, in partial remission (HCC)  -     Discontinue: azelastine (ASTELIN) 0 1 % nasal spray; 1 spray into each nostril 2 (two) times a day Use in each nostril as directed  -     atorvastatin (LIPITOR) 40 mg tablet; Take 1 tablet (40 mg total) by mouth daily  -     Lipid panel;  Future  -     Comprehensive metabolic panel; Future  -     TSH, 3rd generation; Future    Malignant neoplasm of cervix, unspecified site ([facility] [address] )  -     Discontinue: azelastine (ASTELIN) 0 1 % nasal spray; 1 spray into each nostril 2 (two) times a day Use in each nostril as directed  -     atorvastatin (LIPITOR) 40 mg tablet; Take 1 tablet (40 mg total) by mouth daily  -     Lipid panel; Future  -     Comprehensive metabolic panel; Future  -     TSH, 3rd generation; Future    Abdominal aortic aneurysm (AAA) without rupture (HCC)  -     Discontinue: azelastine (ASTELIN) 0 1 % nasal spray; 1 spray into each nostril 2 (two) times a day Use in each nostril as directed  -     atorvastatin (LIPITOR) 40 mg tablet; Take 1 tablet (40 mg total) by mouth daily  -     Lipid panel; Future  -     Comprehensive metabolic panel; Future  -     TSH, 3rd generation; Future    Clotting disorder (HCC)  -     Discontinue: azelastine (ASTELIN) 0 1 % nasal spray; 1 spray into each nostril 2 (two) times a day Use in each nostril as directed  -     atorvastatin (LIPITOR) 40 mg tablet; Take 1 tablet (40 mg total) by mouth daily  -     Lipid panel; Future  -     Comprehensive metabolic panel; Future  -     TSH, 3rd generation; Future    Screening for osteoporosis  -     DXA bone density spine hip and pelvis; Future  -     Discontinue: azelastine (ASTELIN) 0 1 % nasal spray; 1 spray into each nostril 2 (two) times a day Use in each nostril as directed  -     atorvastatin (LIPITOR) 40 mg tablet; Take 1 tablet (40 mg total) by mouth daily  -     Lipid panel; Future  -     Comprehensive metabolic panel; Future  -     TSH, 3rd generation; Future    Other orders  -     cholecalciferol (VITAMIN D3) 400 units tablet; Take 400 Units by mouth daily  -     Ascorbic Acid (vitamin C) 100 MG tablet; Take 100 mg by mouth daily  -     Specialty Vitamins Products (magnesium, amino acid chelate,) 133 MG tablet;  Take 1 tablet by mouth 2 (two) times a day          Subjective:      Patient ID: Amilcar Brooke is a 78 y o  female  She has significant disc herniation and degenerative changes from L2 through S1  She has bilateral foraminal stenosis as well at multiple levels  She has chronic low back pain that is radiating sown both legs  She is taking Vicodin with fair pain relief  It is not working as well as it used to  She never had benefit from Lyrica or gabapentin  Over the years, she has tried a number of NSAIDs  She has not had injections recently, but had them previously with some relief  She is s/p two laminectomies  The following portions of the patient's history were reviewed and updated as appropriate:   She  has a past medical history of Elevated liver enzymes, Fracture of rib, HPV (human papilloma virus) infection, Osteoporosis, Pulmonary nodule, and Thyroid nodule    She   Patient Active Problem List    Diagnosis Date Noted    Cervical ca ([address] ) 11/29/2021    Refused influenza vaccine 11/29/2021    Centrilobular emphysema ([address] ) 09/28/2021    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 ([address] ) 11/27/2020    Opioid dependence, uncomplicated ([address] ) 76/54/4454    Recurrent major depressive disorder, in partial remission ([address] ) 11/27/2020    Atherosclerosis of abdominal aorta ([address] ) 11/27/2020    Clotting disorder ([address] ) 02/21/2020    BMI 26 0-26 9,adult 05/10/2019    Smoker 05/10/2019    Medicare annual wellness visit, subsequent 05/10/2019    Chronic groin pain, right 12/19/2018    Thyroid nodule 10/16/2018    Chronic pain of right knee 10/16/2018    Pulmonary nodules 09/11/2018    Cytologic evidence of malignancy on smear of cervix 09/11/2018    Asymptomatic microscopic hematuria 06/11/2018    Rash of face 06/11/2018    Essential hypertension 03/22/2018    Other hyperlipidemia 03/22/2018    Screening for genitourinary condition 03/22/2018    Screening for neurological condition 03/22/2018    COPD with acute exacerbation ([address] ) 12/13/2017    Hyperlipidemia 08/31/2015    Hypertension 08/31/2015    Inflammation of sacroiliac joint ([facility] [address] ) 08/29/2015    Lumbar disc disease 08/18/2014    High risk medication use 11/13/2013    HTN, goal below 140/90 08/24/2011     She  has a past surgical history that includes Appendectomy; Back surgery; Cholecystectomy; Foot surgery; Total hip arthroplasty (Right); Total abdominal hysterectomy; Colposcopy; Colonoscopy w/ biopsies; US guided thyroid biopsy; and Cataract extraction  Her family history includes Diabetes type II in her mother; Heart disease in her father and mother  She  reports that she has been smoking cigarettes  She has a 35 00 pack-year smoking history  She has never used smokeless tobacco  She reports that she does not drink alcohol and does not use drugs    Current Outpatient Medications   Medication Sig Dispense Refill    Ascorbic Acid (vitamin C) 100 MG tablet Take 100 mg by mouth daily      cholecalciferol (VITAMIN D3) 400 units tablet Take 400 Units by mouth daily      Specialty Vitamins Products (magnesium, amino acid chelate,) 133 MG tablet Take 1 tablet by mouth 2 (two) times a day      atenolol (TENORMIN) 25 mg tablet Take 2 tablets (50 mg total) by mouth daily 180 tablet 3    atorvastatin (LIPITOR) 40 mg tablet Take 1 tablet (40 mg total) by mouth daily 90 tablet 3    azelastine (ASTELIN) 0 1 % nasal spray 1 spray into each nostril 2 (two) times a day Use in each nostril as directed 30 mL 5    Combivent Respimat inhaler Inhale 1 puff 4 (four) times a day 4 g 5    diazepam (VALIUM) 5 mg tablet Take 1 tablet (5 mg total) by mouth 3 (three) times a day as needed for anxiety 90 tablet 5    ergocalciferol (VITAMIN D2) 50,000 units Take 1 capsule (50,000 Units total) by mouth once a week 4 capsule 5    hydrochlorothiazide (MICROZIDE) 12 5 mg capsule Take 1 capsule (12 5 mg total) by mouth daily 90 capsule 3    Magnesium 500 MG TABS Take 1 tablet by mouth daily      oxyCODONE-acetaminophen (Percocet)  mg per tablet Take 1 tablet by mouth every 6 (six) hours as needed for moderate pain Max Daily Amount: 4 tablets 120 tablet 0    prednisoLONE acetate (PRED FORTE) 1 % ophthalmic suspension instill 1 drop into left eye four times a day (TO BEGIN AFTER SURGERY)      Stiolto Respimat 2 5-2 5 MCG/ACT inhaler inhale 1 puff by mouth daily 12 g 5    venlafaxine (EFFEXOR-XR) 75 mg 24 hr capsule take 1 capsule by mouth daily with breakfast 30 capsule 5     No current facility-administered medications for this visit       Current Outpatient Medications on File Prior to Visit   Medication Sig    Ascorbic Acid (vitamin C) 100 MG tablet Take 100 mg by mouth daily    cholecalciferol (VITAMIN D3) 400 units tablet Take 400 Units by mouth daily    Specialty Vitamins Products (magnesium, amino acid chelate,) 133 MG tablet Take 1 tablet by mouth 2 (two) times a day    atenolol (TENORMIN) 25 mg tablet Take 2 tablets (50 mg total) by mouth daily    azelastine (ASTELIN) 0 1 % nasal spray 1 spray into each nostril 2 (two) times a day Use in each nostril as directed    Combivent Respimat inhaler Inhale 1 puff 4 (four) times a day    diazepam (VALIUM) 5 mg tablet Take 1 tablet (5 mg total) by mouth 3 (three) times a day as needed for anxiety    hydrochlorothiazide (MICROZIDE) 12 5 mg capsule Take 1 capsule (12 5 mg total) by mouth daily    Magnesium 500 MG TABS Take 1 tablet by mouth daily    prednisoLONE acetate (PRED FORTE) 1 % ophthalmic suspension instill 1 drop into left eye four times a day (TO BEGIN AFTER SURGERY)    Stiolto Respimat 2 5-2 5 MCG/ACT inhaler inhale 1 puff by mouth daily    venlafaxine (EFFEXOR-XR) 75 mg 24 hr capsule take 1 capsule by mouth daily with breakfast    [DISCONTINUED] atenolol (TENORMIN) 50 mg tablet take 1 tablet by mouth once daily    [DISCONTINUED] HYDROcodone-acetaminophen (NORCO)  mg per tablet Take 1 tablet every 6 hrs as needed    [DISCONTINUED] rosuvastatin (CRESTOR) 20 MG tablet Take 1 tablet (20 mg total) by mouth daily     No current facility-administered medications on file prior to visit  She has No Known Allergies       Review of Systems   Musculoskeletal: Positive for back pain, gait problem and myalgias  All other systems reviewed and are negative  Objective:      /74   Pulse 88   Temp 97 8 °F (36 6 °C)   Ht 5' 4" (1 626 m)   Wt 71 7 kg (158 lb)   SpO2 98%   BMI 27 12 kg/m²          Physical Exam  Vitals and nursing note reviewed  Constitutional:       Appearance: Normal appearance  She is normal weight  HENT:      Head: Normocephalic and atraumatic  Cardiovascular:      Rate and Rhythm: Normal rate and regular rhythm  Pulses: Normal pulses  Heart sounds: Normal heart sounds  Pulmonary:      Effort: Pulmonary effort is normal       Breath sounds: Normal breath sounds  Abdominal:      General: Abdomen is flat  Bowel sounds are normal       Palpations: Abdomen is soft  Musculoskeletal:         General: Normal range of motion  Cervical back: Normal range of motion and neck supple  Skin:     General: Skin is warm and dry  Capillary Refill: Capillary refill takes less than 2 seconds  Neurological:      General: No focal deficit present  Mental Status: She is alert and oriented to person, place, and time  Mental status is at baseline  Psychiatric:         Mood and Affect: Mood normal          Behavior: Behavior normal          Thought Content:  Thought content normal          Judgment: Judgment normal

## 2022-01-21 ENCOUNTER — TELEPHONE (OUTPATIENT)
Dept: FAMILY MEDICINE CLINIC | Facility: CLINIC | Age: 77
End: 2022-01-21

## 2022-01-21 DIAGNOSIS — J30.9 ALLERGIC RHINITIS, UNSPECIFIED SEASONALITY, UNSPECIFIED TRIGGER: ICD-10-CM

## 2022-01-21 RX ORDER — AZELASTINE 1 MG/ML
1 SPRAY, METERED NASAL 2 TIMES DAILY
Qty: 30 ML | Refills: 5 | Status: SHIPPED | OUTPATIENT
Start: 2022-01-21

## 2022-01-21 NOTE — TELEPHONE ENCOUNTER
Pt called, states she discussed with you at last visit about  using a nasal spray  Pt requesting rx to pharm, states she cannot use flonase prefers something else    ty

## 2022-01-21 NOTE — TELEPHONE ENCOUNTER
Pt said she spoke to you about getting a nasal spray, but she doesn't want flonase, it doesn't work for her, she would like called into [facility] aide if possible

## 2022-02-18 DIAGNOSIS — M54.42 BILATERAL LOW BACK PAIN WITH BILATERAL SCIATICA, UNSPECIFIED CHRONICITY: ICD-10-CM

## 2022-02-18 DIAGNOSIS — M54.41 BILATERAL LOW BACK PAIN WITH BILATERAL SCIATICA, UNSPECIFIED CHRONICITY: ICD-10-CM

## 2022-02-18 RX ORDER — OXYCODONE AND ACETAMINOPHEN 10; 325 MG/1; MG/1
1 TABLET ORAL EVERY 6 HOURS PRN
Qty: 120 TABLET | Refills: 0 | Status: SHIPPED | OUTPATIENT
Start: 2022-02-18 | End: 2022-04-04 | Stop reason: SDUPTHER

## 2022-03-04 ENCOUNTER — RA CDI HCC (OUTPATIENT)
Dept: OTHER | Facility: HOSPITAL | Age: 77
End: 2022-03-04

## 2022-03-04 NOTE — PROGRESS NOTES
[facility]  coding opportunities       Chart reviewed, no opportunity found: CHART REVIEWED, NO OPPORTUNITY FOUND                        Patients insurance company: [facility]

## 2022-03-11 ENCOUNTER — OFFICE VISIT (OUTPATIENT)
Dept: FAMILY MEDICINE CLINIC | Facility: CLINIC | Age: 77
End: 2022-03-11
Payer: COMMERCIAL

## 2022-03-11 VITALS
BODY MASS INDEX: 26.29 KG/M2 | SYSTOLIC BLOOD PRESSURE: 134 MMHG | DIASTOLIC BLOOD PRESSURE: 74 MMHG | HEART RATE: 85 BPM | OXYGEN SATURATION: 95 % | WEIGHT: 154 LBS | TEMPERATURE: 97.6 F | HEIGHT: 64 IN

## 2022-03-11 DIAGNOSIS — Z12.2 ENCOUNTER FOR SCREENING FOR LUNG CANCER: ICD-10-CM

## 2022-03-11 DIAGNOSIS — R68.2 DRY MOUTH: Primary | ICD-10-CM

## 2022-03-11 DIAGNOSIS — I10 ESSENTIAL HYPERTENSION: ICD-10-CM

## 2022-03-11 DIAGNOSIS — J44.1 COPD WITH ACUTE EXACERBATION (HCC): ICD-10-CM

## 2022-03-11 DIAGNOSIS — I70.0 ATHEROSCLEROSIS OF ABDOMINAL AORTA (HCC): ICD-10-CM

## 2022-03-11 DIAGNOSIS — E78.2 MIXED HYPERLIPIDEMIA: ICD-10-CM

## 2022-03-11 DIAGNOSIS — B37.0 THRUSH: ICD-10-CM

## 2022-03-11 PROCEDURE — 99214 OFFICE O/P EST MOD 30 MIN: CPT | Performed by: FAMILY MEDICINE

## 2022-03-11 NOTE — PROGRESS NOTES
Assessment/Plan:    No problem-specific Assessment & Plan notes found for this encounter  Diagnoses and all orders for this visit:    Dry mouth  -     MIKAYLA Screen w/ Reflex to Titer/Pattern; Future  -     Sjogren's Antibodies; Future    Thrush  -     nystatin (MYCOSTATIN) 500,000 units/5 mL suspension; Apply 5 mL (500,000 Units total) to the mouth or throat 4 (four) times a day    COPD with acute exacerbation ([facility] )    Essential hypertension    Atherosclerosis of abdominal aorta ([facility] )    Mixed hyperlipidemia    Encounter for screening for lung cancer  -     CT lung screening program; Future          Subjective:      Patient ID: Terri Mohan is a 78 y o  female  She is happy with her pain regimen at this point  She had been on hydrocodone for many years  She is having better pain relief currently  She is seeing pain management and receiving injections  She failed Naproxen, Celebrex, Mobic, and other NSAIDs  She had side effects from gabapentin  She is on Effexor, which is being used for depression and off-label for pain  We have discussed the risks and benefits of long-term opioid-based pain medications  She and I are in agreement that in her case, the benefits outweigh the risks  With the medication, she can do more housework  She is able to be more active and can get to the store  She feels better from a mood standpoint given that she is more mobile  The following portions of the patient's history were reviewed and updated as appropriate:   She  has a past medical history of Elevated liver enzymes, Fracture of rib, HPV (human papilloma virus) infection, Osteoporosis, Pulmonary nodule, and Thyroid nodule    She   Patient Active Problem List    Diagnosis Date Noted    Cervical ca [facility]) 11/29/2021    Refused influenza vaccine 11/29/2021    Centrilobular emphysema ([facility] ) 09/28/2021    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 ([facility] ) 11/27/2020    Opioid dependence, uncomplicated ([facility] ) 11/27/2020    Recurrent major depressive disorder, in partial remission ([facility] ) 11/27/2020    Atherosclerosis of abdominal aorta ([place] [address] ) 11/27/2020    Clotting disorder ([place] [address] ) 02/21/2020    BMI 26 0-26 9,adult 05/10/2019    Smoker 05/10/2019    Medicare annual wellness visit, subsequent 05/10/2019    Chronic groin pain, right 12/19/2018    Thyroid nodule 10/16/2018    Chronic pain of right knee 10/16/2018    Pulmonary nodules 09/11/2018    Cytologic evidence of malignancy on smear of cervix 09/11/2018    Asymptomatic microscopic hematuria 06/11/2018    Rash of face 06/11/2018    Essential hypertension 03/22/2018    Other hyperlipidemia 03/22/2018    Screening for genitourinary condition 03/22/2018    Screening for neurological condition 03/22/2018    COPD with acute exacerbation ([place] [address] ) 12/13/2017    Hyperlipidemia 08/31/2015    Hypertension 08/31/2015    Inflammation of sacroiliac joint ([place] [address] ) 08/29/2015    Lumbar disc disease 08/18/2014    High risk medication use 11/13/2013    HTN, goal below 140/90 08/24/2011     She  has a past surgical history that includes Appendectomy; Back surgery; Cholecystectomy; Foot surgery; Total hip arthroplasty (Right); Total abdominal hysterectomy; Colposcopy; Colonoscopy w/ biopsies; US guided thyroid biopsy; and Cataract extraction  Her family history includes Diabetes type II in her mother; Heart disease in her father and mother  She  reports that she has been smoking cigarettes  She has a 35 00 pack-year smoking history  She has never used smokeless tobacco  She reports that she does not drink alcohol and does not use drugs    Current Outpatient Medications   Medication Sig Dispense Refill    Ascorbic Acid (vitamin C) 100 MG tablet Take 100 mg by mouth daily      atenolol (TENORMIN) 25 mg tablet Take 2 tablets (50 mg total) by mouth daily 180 tablet 3    atorvastatin (LIPITOR) 40 mg tablet Take 1 tablet (40 mg total) by mouth daily 90 tablet 3    azelastine (ASTELIN) 0 1 % nasal spray 1 spray into each nostril 2 (two) times a day Use in each nostril as directed 30 mL 5    cholecalciferol (VITAMIN D3) 400 units tablet Take 400 Units by mouth daily      Combivent Respimat inhaler Inhale 1 puff 4 (four) times a day 4 g 5    diazepam (VALIUM) 5 mg tablet Take 1 tablet (5 mg total) by mouth 3 (three) times a day as needed for anxiety 90 tablet 5    ergocalciferol (VITAMIN D2) 50,000 units Take 1 capsule (50,000 Units total) by mouth once a week 4 capsule 5    hydrochlorothiazide (MICROZIDE) 12 5 mg capsule Take 1 capsule (12 5 mg total) by mouth daily 90 capsule 3    Magnesium 500 MG TABS Take 1 tablet by mouth daily      oxyCODONE-acetaminophen (Percocet)  mg per tablet Take 1 tablet by mouth every 6 (six) hours as needed for moderate pain Max Daily Amount: 4 tablets 120 tablet 0    prednisoLONE acetate (PRED FORTE) 1 % ophthalmic suspension instill 1 drop into left eye four times a day (TO BEGIN AFTER SURGERY)      Specialty Vitamins Products (magnesium, amino acid chelate,) 133 MG tablet Take 1 tablet by mouth 2 (two) times a day      Stiolto Respimat 2 5-2 5 MCG/ACT inhaler inhale 1 puff by mouth daily 12 g 5    venlafaxine (EFFEXOR-XR) 75 mg 24 hr capsule take 1 capsule by mouth daily with breakfast 30 capsule 5    nystatin (MYCOSTATIN) 500,000 units/5 mL suspension Apply 5 mL (500,000 Units total) to the mouth or throat 4 (four) times a day 60 mL 5     No current facility-administered medications for this visit       Current Outpatient Medications on File Prior to Visit   Medication Sig    Ascorbic Acid (vitamin C) 100 MG tablet Take 100 mg by mouth daily    atenolol (TENORMIN) 25 mg tablet Take 2 tablets (50 mg total) by mouth daily    atorvastatin (LIPITOR) 40 mg tablet Take 1 tablet (40 mg total) by mouth daily    azelastine (ASTELIN) 0 1 % nasal spray 1 spray into each nostril 2 (two) times a day Use in each nostril as directed    cholecalciferol (VITAMIN D3) 400 units tablet Take 400 Units by mouth daily    Combivent Respimat inhaler Inhale 1 puff 4 (four) times a day    diazepam (VALIUM) 5 mg tablet Take 1 tablet (5 mg total) by mouth 3 (three) times a day as needed for anxiety    ergocalciferol (VITAMIN D2) 50,000 units Take 1 capsule (50,000 Units total) by mouth once a week    hydrochlorothiazide (MICROZIDE) 12 5 mg capsule Take 1 capsule (12 5 mg total) by mouth daily    Magnesium 500 MG TABS Take 1 tablet by mouth daily    oxyCODONE-acetaminophen (Percocet)  mg per tablet Take 1 tablet by mouth every 6 (six) hours as needed for moderate pain Max Daily Amount: 4 tablets    prednisoLONE acetate (PRED FORTE) 1 % ophthalmic suspension instill 1 drop into left eye four times a day (TO BEGIN AFTER SURGERY)    Specialty Vitamins Products (magnesium, amino acid chelate,) 133 MG tablet Take 1 tablet by mouth 2 (two) times a day    Stiolto Respimat 2 5-2 5 MCG/ACT inhaler inhale 1 puff by mouth daily    venlafaxine (EFFEXOR-XR) 75 mg 24 hr capsule take 1 capsule by mouth daily with breakfast     No current facility-administered medications on file prior to visit  She has No Known Allergies       Review of Systems   All other systems reviewed and are negative  Objective:      /74   Pulse 85   Temp 97 6 °F (36 4 °C)   Ht 5' 4" (1 626 m)   Wt 69 9 kg (154 lb)   SpO2 95%   BMI 26 43 kg/m²          Physical Exam  Vitals and nursing note reviewed  Constitutional:       Appearance: Normal appearance  She is normal weight  HENT:      Head: Normocephalic and atraumatic  Cardiovascular:      Rate and Rhythm: Normal rate and regular rhythm  Pulses: Normal pulses  Heart sounds: Normal heart sounds  Pulmonary:      Effort: Pulmonary effort is normal       Breath sounds: Normal breath sounds  Abdominal:      General: Abdomen is flat  Bowel sounds are normal       Palpations: Abdomen is soft  Musculoskeletal:         General: Normal range of motion  Cervical back: Normal range of motion and neck supple  Skin:     General: Skin is warm and dry  Capillary Refill: Capillary refill takes less than 2 seconds  Neurological:      General: No focal deficit present  Mental Status: She is alert and oriented to person, place, and time  Mental status is at baseline  Psychiatric:         Mood and Affect: Mood normal          Behavior: Behavior normal          Thought Content:  Thought content normal          Judgment: Judgment normal

## 2022-03-11 NOTE — PROGRESS NOTES
BMI Counseling: Body mass index is 26 43 kg/m²  The BMI is above normal  Nutrition recommendations include reducing portion sizes, decreasing overall calorie intake, 3-5 servings of fruits/vegetables daily, reducing fast food intake, consuming healthier snacks, decreasing soda and/or juice intake, moderation in carbohydrate intake, increasing intake of lean protein, reducing intake of saturated fat and trans fat and reducing intake of cholesterol  Exercise recommendations include moderate aerobic physical activity for 150 minutes/week, vigorous aerobic physical activity for 75 minutes/week, exercising 3-5 times per week, joining a gym and strength training exercises

## 2022-03-16 ENCOUNTER — OFFICE VISIT (OUTPATIENT)
Dept: FAMILY MEDICINE CLINIC | Facility: CLINIC | Age: 77
End: 2022-03-16
Payer: COMMERCIAL

## 2022-03-16 VITALS
HEIGHT: 64 IN | DIASTOLIC BLOOD PRESSURE: 80 MMHG | OXYGEN SATURATION: 97 % | TEMPERATURE: 97.3 F | HEART RATE: 74 BPM | BODY MASS INDEX: 26.29 KG/M2 | SYSTOLIC BLOOD PRESSURE: 122 MMHG | WEIGHT: 154 LBS

## 2022-03-16 DIAGNOSIS — R20.8 ALLODYNIA: Primary | ICD-10-CM

## 2022-03-16 PROCEDURE — 3079F DIAST BP 80-89 MM HG: CPT | Performed by: SURGERY

## 2022-03-16 PROCEDURE — 99213 OFFICE O/P EST LOW 20 MIN: CPT | Performed by: SURGERY

## 2022-03-16 PROCEDURE — 1160F RVW MEDS BY RX/DR IN RCRD: CPT | Performed by: SURGERY

## 2022-03-16 PROCEDURE — 3074F SYST BP LT 130 MM HG: CPT | Performed by: SURGERY

## 2022-03-16 PROCEDURE — 3008F BODY MASS INDEX DOCD: CPT | Performed by: SURGERY

## 2022-03-16 PROCEDURE — 4004F PT TOBACCO SCREEN RCVD TLK: CPT | Performed by: SURGERY

## 2022-03-16 RX ORDER — CARBAMAZEPINE 100 MG/1
100 TABLET, CHEWABLE ORAL 3 TIMES DAILY
Qty: 30 TABLET | Refills: 0 | Status: SHIPPED | OUTPATIENT
Start: 2022-03-16

## 2022-03-16 RX ORDER — LIDOCAINE HYDROCHLORIDE 20 MG/ML
JELLY TOPICAL AS NEEDED
Qty: 30 ML | Refills: 0 | Status: SHIPPED | OUTPATIENT
Start: 2022-03-16 | End: 2022-06-13 | Stop reason: ALTCHOICE

## 2022-03-16 NOTE — PROGRESS NOTES
Assessment/Plan:      Diagnoses and all orders for this visit:    Allodynia  Comments:  Left ear, chronic  Normal physical exam    Orders:  -     carBAMazepine (TEGretol) 100 mg chewable tablet; Chew 1 tablet (100 mg total) 3 (three) times a day  -     lidocaine (XYLOCAINE) 2 % topical gel; Apply topically as needed for mild pain          Subjective:     Patient ID: Bettina Peralta is a 78 y o  female  Office appointment for left ear pain  She reports episodes of left ear pain which last a few days during these episodes she experiences allodynia with touch of her helix and tragus  The pain is excruciating and radiates to the inside of her ear canal   It does not radiate to her face jaw or neck  She denies any rash of the area denies a past history of herpes zoster and/or herpes ophthalmicus  She has noticed no attenuating or aggravating factors  The pain will spontaneously subside  She reports she has had this issue in the past many years ago and again the pain would last a couple days subside and then return  Her last event besides this 1 was about 5 years ago  She has never had this issue worked up  Review of Systems   Constitutional: Negative for chills and fever  HENT: Positive for ear pain  Negative for ear discharge, hearing loss, sore throat and tinnitus  Eyes: Negative for pain and visual disturbance  Respiratory: Negative for cough and shortness of breath  Cardiovascular: Negative for chest pain and palpitations  Gastrointestinal: Negative for abdominal pain and vomiting  Genitourinary: Negative for dysuria and hematuria  Musculoskeletal: Negative for arthralgias and back pain  Skin: Negative for color change and rash  Neurological: Negative for seizures and syncope  All other systems reviewed and are negative  Objective:     Physical Exam  Vitals reviewed  Constitutional:       General: She is not in acute distress  Appearance: Normal appearance   She is not ill-appearing  HENT:      Head: Normocephalic and atraumatic  Right Ear: Tympanic membrane, ear canal and external ear normal  There is no impacted cerumen  Left Ear: Tympanic membrane, ear canal and external ear normal  There is no impacted cerumen  Nose: Nose normal  No congestion or rhinorrhea  Mouth/Throat:      Mouth: Mucous membranes are moist       Pharynx: Oropharynx is clear  No oropharyngeal exudate or posterior oropharyngeal erythema  Eyes:      Extraocular Movements: Extraocular movements intact  Conjunctiva/sclera: Conjunctivae normal       Pupils: Pupils are equal, round, and reactive to light  Cardiovascular:      Rate and Rhythm: Normal rate and regular rhythm  Pulses: Normal pulses  Heart sounds: Normal heart sounds  No murmur heard  No friction rub  No gallop  Pulmonary:      Effort: Pulmonary effort is normal       Breath sounds: Normal breath sounds  No wheezing  Abdominal:      General: There is no distension  Tenderness: There is no abdominal tenderness  Musculoskeletal:         General: Normal range of motion  Cervical back: Normal range of motion  Skin:     General: Skin is warm  Capillary Refill: Capillary refill takes less than 2 seconds  Neurological:      General: No focal deficit present  Mental Status: She is alert and oriented to person, place, and time  Mental status is at baseline  Psychiatric:         Mood and Affect: Mood normal          Behavior: Behavior normal          Thought Content:  Thought content normal          Judgment: Judgment normal            Wt Readings from Last 3 Encounters:   03/16/22 69 9 kg (154 lb)   03/11/22 69 9 kg (154 lb)   01/07/22 71 7 kg (158 lb)     Temp Readings from Last 3 Encounters:   03/16/22 (!) 97 3 °F (36 3 °C)   03/11/22 97 6 °F (36 4 °C)   01/07/22 97 8 °F (36 6 °C)     BP Readings from Last 3 Encounters:   03/16/22 122/80   03/11/22 134/74   01/07/22 138/74     Pulse Readings from Last 3 Encounters:   03/16/22 74   03/11/22 85   01/07/22 88

## 2022-04-04 DIAGNOSIS — M54.42 BILATERAL LOW BACK PAIN WITH BILATERAL SCIATICA, UNSPECIFIED CHRONICITY: ICD-10-CM

## 2022-04-04 DIAGNOSIS — M54.41 BILATERAL LOW BACK PAIN WITH BILATERAL SCIATICA, UNSPECIFIED CHRONICITY: ICD-10-CM

## 2022-04-04 DIAGNOSIS — B37.0 THRUSH: ICD-10-CM

## 2022-04-04 RX ORDER — OXYCODONE AND ACETAMINOPHEN 10; 325 MG/1; MG/1
1 TABLET ORAL EVERY 6 HOURS PRN
Qty: 120 TABLET | Refills: 0 | Status: SHIPPED | OUTPATIENT
Start: 2022-04-04 | End: 2022-05-20 | Stop reason: SDUPTHER

## 2022-05-20 DIAGNOSIS — M54.42 BILATERAL LOW BACK PAIN WITH BILATERAL SCIATICA, UNSPECIFIED CHRONICITY: ICD-10-CM

## 2022-05-20 DIAGNOSIS — M54.41 BILATERAL LOW BACK PAIN WITH BILATERAL SCIATICA, UNSPECIFIED CHRONICITY: ICD-10-CM

## 2022-05-20 NOTE — TELEPHONE ENCOUNTER
Patient is requesting changing prescription to hydrocodone because her insurance is not covering the cost of oxycodone  Please follow up with patient

## 2022-05-22 RX ORDER — OXYCODONE AND ACETAMINOPHEN 10; 325 MG/1; MG/1
1 TABLET ORAL EVERY 6 HOURS PRN
Qty: 120 TABLET | Refills: 0 | Status: SHIPPED | OUTPATIENT
Start: 2022-05-22 | End: 2022-07-26 | Stop reason: SDUPTHER

## 2022-05-23 ENCOUNTER — TELEPHONE (OUTPATIENT)
Dept: FAMILY MEDICINE CLINIC | Facility: CLINIC | Age: 77
End: 2022-05-23

## 2022-05-23 DIAGNOSIS — M46.1 INFLAMMATION OF SACROILIAC JOINT (HCC): Primary | ICD-10-CM

## 2022-05-23 RX ORDER — HYDROCODONE BITARTRATE AND ACETAMINOPHEN 10; 325 MG/1; MG/1
1 TABLET ORAL EVERY 6 HOURS PRN
Qty: 120 TABLET | Refills: 0 | Status: SHIPPED | OUTPATIENT
Start: 2022-05-23

## 2022-05-23 NOTE — TELEPHONE ENCOUNTER
Patient states oxycodone is not covered by her insurance and it will be $43  She is requesting that it is changed by to hydrocodone since that may be coved and she will be going for injections  Patient was informed request has to be reviewed by Dr Jose Blanco  Patient states "I am taking a walk to the office to speak with the   I can never get my questions answered  I don't even know when my next appointment is " Patient was informed her next appointment is scheduled for 6/13 and that it is protocol for the providers to review any controlled substances  Patient is still coming to the office

## 2022-05-23 NOTE — TELEPHONE ENCOUNTER
Pt came into office, states she needs medication adjustment due to insurance and cost  Pt requesting rx for hydrocodone instead of oxycodone, states copay is cheaper   Pt did not  rx for oxycodone due to cost

## 2022-05-25 ENCOUNTER — TELEPHONE (OUTPATIENT)
Dept: FAMILY MEDICINE CLINIC | Facility: CLINIC | Age: 77
End: 2022-05-25

## 2022-06-07 ENCOUNTER — RA CDI HCC (OUTPATIENT)
Dept: OTHER | Facility: HOSPITAL | Age: 77
End: 2022-06-07

## 2022-06-07 NOTE — PROGRESS NOTES
[facility]  coding opportunities       Chart reviewed, no opportunity found:   [address]        Patients Insurance     Medicare Insurance: [employer]

## 2022-06-10 ENCOUNTER — TELEPHONE (OUTPATIENT)
Dept: ADMINISTRATIVE | Facility: OTHER | Age: 77
End: 2022-06-10

## 2022-06-10 DIAGNOSIS — F41.9 ANXIETY: ICD-10-CM

## 2022-06-10 RX ORDER — DIAZEPAM 5 MG/1
TABLET ORAL
Qty: 90 TABLET | Refills: 5 | Status: SHIPPED | OUTPATIENT
Start: 2022-06-10

## 2022-06-10 NOTE — TELEPHONE ENCOUNTER
Upon review of the In Basket request we were able to locate, review, and update the patient chart as requested for CT Lung Screening  Any additional questions or concerns should be emailed to the Practice Liaisons via [email or web address] email, please do not reply via In Basket      Thank you  Roseline Ch

## 2022-06-10 NOTE — TELEPHONE ENCOUNTER
----- Message from Linda Trejo MA sent at 6/10/2022 10:38 AM EDT -----  Regarding: care gap request  06/10/22 10:38 AM    Hello, our patient attached above has had CT Lung Scan completed/performed  Please assist in updating the patient chart by pulling the Care Everywhere (CE) document  The date of service is 06/22/2021       Thank you,  Linda Trejo MA  [facility]

## 2022-06-13 ENCOUNTER — OFFICE VISIT (OUTPATIENT)
Dept: FAMILY MEDICINE CLINIC | Facility: CLINIC | Age: 77
End: 2022-06-13
Payer: COMMERCIAL

## 2022-06-13 VITALS
SYSTOLIC BLOOD PRESSURE: 148 MMHG | WEIGHT: 157.4 LBS | OXYGEN SATURATION: 95 % | HEIGHT: 64 IN | RESPIRATION RATE: 18 BRPM | HEART RATE: 60 BPM | DIASTOLIC BLOOD PRESSURE: 82 MMHG | TEMPERATURE: 98.8 F | BODY MASS INDEX: 26.87 KG/M2

## 2022-06-13 DIAGNOSIS — M25.552 LEFT HIP PAIN: ICD-10-CM

## 2022-06-13 DIAGNOSIS — Z79.899 OTHER LONG TERM (CURRENT) DRUG THERAPY: ICD-10-CM

## 2022-06-13 DIAGNOSIS — J43.2 CENTRILOBULAR EMPHYSEMA (HCC): ICD-10-CM

## 2022-06-13 DIAGNOSIS — Z00.00 MEDICARE ANNUAL WELLNESS VISIT, SUBSEQUENT: ICD-10-CM

## 2022-06-13 DIAGNOSIS — Z79.899 HIGH RISK MEDICATION USE: ICD-10-CM

## 2022-06-13 DIAGNOSIS — I70.0 ATHEROSCLEROSIS OF ABDOMINAL AORTA (HCC): ICD-10-CM

## 2022-06-13 DIAGNOSIS — G89.4 CHRONIC PAIN SYNDROME: ICD-10-CM

## 2022-06-13 DIAGNOSIS — Z12.2 ENCOUNTER FOR SCREENING FOR LUNG CANCER: ICD-10-CM

## 2022-06-13 DIAGNOSIS — F11.20 CONTINUOUS OPIOID DEPENDENCE (HCC): ICD-10-CM

## 2022-06-13 DIAGNOSIS — J44.1 COPD WITH ACUTE EXACERBATION (HCC): ICD-10-CM

## 2022-06-13 DIAGNOSIS — M48.061 SPINAL STENOSIS OF LUMBAR REGION WITHOUT NEUROGENIC CLAUDICATION: ICD-10-CM

## 2022-06-13 DIAGNOSIS — Z87.891 PERSONAL HISTORY OF NICOTINE DEPENDENCE: ICD-10-CM

## 2022-06-13 DIAGNOSIS — M46.1 INFLAMMATION OF SACROILIAC JOINT (HCC): Primary | ICD-10-CM

## 2022-06-13 PROCEDURE — 1125F AMNT PAIN NOTED PAIN PRSNT: CPT | Performed by: FAMILY MEDICINE

## 2022-06-13 PROCEDURE — 3288F FALL RISK ASSESSMENT DOCD: CPT | Performed by: FAMILY MEDICINE

## 2022-06-13 PROCEDURE — 3077F SYST BP >= 140 MM HG: CPT | Performed by: FAMILY MEDICINE

## 2022-06-13 PROCEDURE — 4004F PT TOBACCO SCREEN RCVD TLK: CPT | Performed by: FAMILY MEDICINE

## 2022-06-13 PROCEDURE — G0439 PPPS, SUBSEQ VISIT: HCPCS | Performed by: FAMILY MEDICINE

## 2022-06-13 PROCEDURE — 3079F DIAST BP 80-89 MM HG: CPT | Performed by: FAMILY MEDICINE

## 2022-06-13 PROCEDURE — 1160F RVW MEDS BY RX/DR IN RCRD: CPT | Performed by: FAMILY MEDICINE

## 2022-06-13 PROCEDURE — 3725F SCREEN DEPRESSION PERFORMED: CPT | Performed by: FAMILY MEDICINE

## 2022-06-13 PROCEDURE — 99214 OFFICE O/P EST MOD 30 MIN: CPT | Performed by: FAMILY MEDICINE

## 2022-06-13 PROCEDURE — 1170F FXNL STATUS ASSESSED: CPT | Performed by: FAMILY MEDICINE

## 2022-06-13 PROCEDURE — 3008F BODY MASS INDEX DOCD: CPT | Performed by: FAMILY MEDICINE

## 2022-06-13 RX ORDER — HYDROCODONE BITARTRATE AND ACETAMINOPHEN 10; 325 MG/1; MG/1
1 TABLET ORAL EVERY 6 HOURS PRN
Qty: 120 TABLET | Refills: 0 | Status: SHIPPED | OUTPATIENT
Start: 2022-06-13 | End: 2022-07-26 | Stop reason: SDUPTHER

## 2022-06-13 RX ORDER — PYRIDOXINE HCL (VITAMIN B6) 100 MG
1 TABLET ORAL DAILY
COMMUNITY

## 2022-06-13 RX ORDER — SIMETHICONE 180 MG
180 CAPSULE ORAL EVERY 6 HOURS PRN
Qty: 100 CAPSULE | Refills: 0 | Status: SHIPPED | OUTPATIENT
Start: 2022-06-13

## 2022-06-13 NOTE — PROGRESS NOTES
Assessment/Plan:    No problem-specific Assessment & Plan notes found for this encounter  Diagnoses and all orders for this visit:    Inflammation of sacroiliac joint (HCC)  -     HYDROcodone-acetaminophen (NORCO)  mg per tablet; Take 1 tablet by mouth every 6 (six) hours as needed for moderate pain Max Daily Amount: 4 tablets  -     CBC and differential; Future  -     Comprehensive metabolic panel; Future  -     TSH, 3rd generation; Future  -     Lipid panel; Future  -     UA (URINE) with reflex to Scope  -     simethicone (Gas-X Ultra Strength) 180 MG capsule; Take 1 capsule (180 mg total) by mouth every 6 (six) hours as needed for flatulence    Encounter for screening for lung cancer  -     CT lung screening program; Future  -     CBC and differential; Future  -     Comprehensive metabolic panel; Future  -     TSH, 3rd generation; Future  -     Lipid panel; Future  -     UA (URINE) with reflex to Scope  -     simethicone (Gas-X Ultra Strength) 180 MG capsule; Take 1 capsule (180 mg total) by mouth every 6 (six) hours as needed for flatulence    Personal history of nicotine dependence  -     CT lung screening program; Future  -     CBC and differential; Future  -     Comprehensive metabolic panel; Future  -     TSH, 3rd generation; Future  -     Lipid panel; Future  -     UA (URINE) with reflex to Scope  -     simethicone (Gas-X Ultra Strength) 180 MG capsule; Take 1 capsule (180 mg total) by mouth every 6 (six) hours as needed for flatulence    Spinal stenosis of lumbar region without neurogenic claudication  -     HYDROcodone-acetaminophen (NORCO)  mg per tablet; Take 1 tablet by mouth every 6 (six) hours as needed for moderate pain Max Daily Amount: 4 tablets  -     CBC and differential; Future  -     Comprehensive metabolic panel; Future  -     TSH, 3rd generation; Future  -     Lipid panel;  Future  -     UA (URINE) with reflex to Scope  -     simethicone (Gas-X Ultra Strength) 180 MG capsule; Take 1 capsule (180 mg total) by mouth every 6 (six) hours as needed for flatulence    COPD with acute exacerbation (HCC)  -     CBC and differential; Future  -     Comprehensive metabolic panel; Future  -     TSH, 3rd generation; Future  -     Lipid panel; Future  -     UA (URINE) with reflex to Scope  -     simethicone (Gas-X Ultra Strength) 180 MG capsule; Take 1 capsule (180 mg total) by mouth every 6 (six) hours as needed for flatulence    Centrilobular emphysema (HCC)  -     CBC and differential; Future  -     Comprehensive metabolic panel; Future  -     TSH, 3rd generation; Future  -     Lipid panel; Future  -     UA (URINE) with reflex to Scope  -     simethicone (Gas-X Ultra Strength) 180 MG capsule; Take 1 capsule (180 mg total) by mouth every 6 (six) hours as needed for flatulence    Atherosclerosis of abdominal aorta (HCC)  -     CBC and differential; Future  -     Comprehensive metabolic panel; Future  -     TSH, 3rd generation; Future  -     Lipid panel; Future  -     UA (URINE) with reflex to Scope  -     simethicone (Gas-X Ultra Strength) 180 MG capsule; Take 1 capsule (180 mg total) by mouth every 6 (six) hours as needed for flatulence    Left hip pain  -     XR hip/pelv 2-3 vws left if performed; Future  -     simethicone (Gas-X Ultra Strength) 180 MG capsule;  Take 1 capsule (180 mg total) by mouth every 6 (six) hours as needed for flatulence    Continuous opioid dependence (HCC)  -     [facility] Prescribed Meds  -     Amphetamines, Methamphetamines  -     Butalbital  -     Phenobarbital  -     Secobarbital  -     Temazepam  -     Alprazolam  -     Clonazepam  -     Diazepam  -     Lorazepam  -     Oxazepam  -     Gabapentin  -     Pregabalin  -     Cocaine  -     Heroin  -     Buprenorphine  -     Levorphanol  -     Meperidine  -     Naltrexone  -     Fentanyl  -     Methadone  -     Oxycodone  -     Oxymorphone  -     Tapentadol  -     Tramadol  -     Codeine, Hydrocodone, Hydropmorphone, Morphine  -     Bath Salts  -     Kratom  -     Spice  -     Methylphenidate  -     Phentermine  -     Validity Oxidant  -     Validity Creatinine  -     Validity pH  -     Validity Specific    Chronic pain syndrome  -     [facility] Med[facility]  -     Amphetamines, Methamphetamines  -     Butalbital  -     Phenobarbital  -     Secobarbital  -     Temazepam  -     Alprazolam  -     Clonazepam  -     Diazepam  -     Lorazepam  -     Oxazepam  -     Gabapentin  -     Pregabalin  -     Cocaine  -     Heroin  -     Buprenorphine  -     Levorphanol  -     Meperidine  -     Naltrexone  -     Fentanyl  -     Methadone  -     Oxycodone  -     Oxymorphone  -     Tapentadol  -     Tramadol  -     Codeine, Hydrocodone, Hydropmorphone, Morphine  -     Bath Salts  -     Kratom  -     Spice  -     Methylphenidate  -     Phentermine  -     Validity Oxidant  -     Validity Creatinine  -     Validity pH  -     Validity Specific    High risk medication use  -     [facility] Prescribed Meds  -     Amphetamines, Methamphetamines  -     Butalbital  -     Phenobarbital  -     Secobarbital  -     Temazepam  -     Alprazolam  -     Clonazepam  -     Diazepam  -     Lorazepam  -     Oxazepam  -     Gabapentin  -     Pregabalin  -     Cocaine  -     Heroin  -     Buprenorphine  -     Levorphanol  -     Meperidine  -     Naltrexone  -     Fentanyl  -     Methadone  -     Oxycodone  -     Oxymorphone  -     Tapentadol  -     Tramadol  -     Codeine, Hydrocodone, Hydropmorphone, Morphine  -     Bath Salts  -     Kratom  -     Spice  -     Methylphenidate  -     Phentermine  -     Validity Oxidant  -     Validity Creatinine  -     Validity pH  -     Validity Specific    Other long term (current) drug therapy  -     [facility] Pres[facility] Med[facility]  -     Amphetamines, Methamphetamines  -     Butalbital  -     Phenobarbital  -     Secobarbital  -     Temazepam  -     Alprazolam  -     Clonazepam  -     Diazepam  - Lorazepam  -     Oxazepam  -     Gabapentin  -     Pregabalin  -     Cocaine  -     Heroin  -     Buprenorphine  -     Levorphanol  -     Meperidine  -     Naltrexone  -     Fentanyl  -     Methadone  -     Oxycodone  -     Oxymorphone  -     Tapentadol  -     Tramadol  -     Codeine, Hydrocodone, Hydropmorphone, Morphine  -     Bath Salts  -     Kratom  -     Spice  -     Methylphenidate  -     Phentermine  -     Validity Oxidant  -     Validity Creatinine  -     Validity pH  -     Validity Specific    Medicare annual wellness visit, subsequent    Other orders  -     Calcium 500-125 MG-UNIT TABS; Take 1 tablet by mouth in the morning          Subjective:      Patient ID: Harrison Tovar is a 78 y o  female  She has chronic LBP secondary to spinal stenosis  She prefers hydrocodone to oxycodone and requests her prescription be changed to reflect that  She has a signed treatment agreement in the chart and urine toxicology consistent with regular use of her prescribed medication  She has fair pain relief and no side effects  She is seeing Pain Management through [facility]  She has COPD  She has trouble with stairs or going up hill  She is on a combination of Stiolto and Combivent  She is happy with this regimen  Her BP is fairly well controlled on her current regimen  She c/o hematuria  It is intermittent  She had a normal CT and cystoscopy  The following portions of the patient's history were reviewed and updated as appropriate:   She  has a past medical history of Elevated liver enzymes, Fracture of rib, HPV (human papilloma virus) infection, Osteoporosis, Pulmonary nodule, and Thyroid nodule    She   Patient Active Problem List    Diagnosis Date Noted    Cervical ca [facility]) 11/29/2021    Refused influenza vaccine 11/29/2021    [facility] ) 09/28/2021    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/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    Opioid dependence, uncomplicated ([facility] ) 69/76/3033    Recurrent major depressive disorder, in partial remission ([facility] ) 11/27/2020    Atherosclerosis of abdominal aorta ([place] [address] ) 11/27/2020    Clotting disorder ([place] [address] ) 02/21/2020    BMI 26 0-26 9,adult 05/10/2019    Smoker 05/10/2019    Medicare annual wellness visit, subsequent 05/10/2019    Chronic groin pain, right 12/19/2018    Thyroid nodule 10/16/2018    Chronic pain of right knee 10/16/2018    Pulmonary nodules 09/11/2018    Cytologic evidence of malignancy on smear of cervix 09/11/2018    Asymptomatic microscopic hematuria 06/11/2018    Rash of face 06/11/2018    Essential hypertension 03/22/2018    Other hyperlipidemia 03/22/2018    Screening for genitourinary condition 03/22/2018    Screening for neurological condition 03/22/2018    COPD with acute exacerbation ([place] [address] ) 12/13/2017    Hyperlipidemia 08/31/2015    Hypertension 08/31/2015    Inflammation of sacroiliac joint ([place] [address] ) 08/29/2015    Lumbar disc disease 08/18/2014    High risk medication use 11/13/2013    HTN, goal below 140/90 08/24/2011     She  has a past surgical history that includes Appendectomy; Back surgery; Cholecystectomy; Foot surgery; Total hip arthroplasty (Right); Total abdominal hysterectomy; Colposcopy; Colonoscopy w/ biopsies; US guided thyroid biopsy; and Cataract extraction  Her family history includes Diabetes type II in her mother; Heart disease in her father and mother  She  reports that she has been smoking cigarettes  She has a 35 00 pack-year smoking history  She has never used smokeless tobacco  She reports that she does not drink alcohol and does not use drugs    Current Outpatient Medications   Medication Sig Dispense Refill    Ascorbic Acid (vitamin C) 100 MG tablet Take 100 mg by mouth daily      atenolol (TENORMIN) 25 mg tablet Take 2 tablets (50 mg total) by mouth daily 180 tablet 3    atorvastatin (LIPITOR) 40 mg tablet Take 1 tablet (40 mg total) by mouth daily 90 tablet 3    azelastine (ASTELIN) 0 1 % nasal spray 1 spray into each nostril 2 (two) times a day Use in each nostril as directed 30 mL 5    Calcium 500-125 MG-UNIT TABS Take 1 tablet by mouth in the morning      carBAMazepine (TEGretol) 100 mg chewable tablet Chew 1 tablet (100 mg total) 3 (three) times a day 30 tablet 0    cholecalciferol (VITAMIN D3) 400 units tablet Take 400 Units by mouth daily      Combivent Respimat inhaler Inhale 1 puff 4 (four) times a day 4 g 5    diazepam (VALIUM) 5 mg tablet take 1 tablet by mouth three times a day if needed for anxiety 90 tablet 5    HYDROcodone-acetaminophen ([place] [facility])  mg per tablet Take 1 tablet by mouth every 6 (six) hours as needed for moderate pain Max Daily Amount: 4 tablets (Patient not taking: Reported on 6/13/2022) 120 tablet 0    HYDROcodone-acetaminophen (NORCO)  mg per tablet Take 1 tablet by mouth every 6 (six) hours as needed for moderate pain Max Daily Amount: 4 tablets 120 tablet 0    Magnesium 500 MG TABS Take 1 tablet by mouth daily      nystatin (MYCOSTATIN) 500,000 units/5 mL suspension Apply 5 mL (500,000 Units total) to the mouth or throat 4 (four) times a day 60 mL 5    simethicone (Gas-X Ultra Strength) 180 MG capsule Take 1 capsule (180 mg total) by mouth every 6 (six) hours as needed for flatulence 100 capsule 0    Specialty Vitamins Products (magnesium, amino acid chelate,) 133 MG tablet Take 1 tablet by mouth 2 (two) times a day      Stiolto Respimat 2 5-2 5 MCG/ACT inhaler inhale 1 puff by mouth daily 12 g 5    venlafaxine (EFFEXOR-XR) 75 mg 24 hr capsule take 1 capsule by mouth daily with breakfast 30 capsule 5    hydrochlorothiazide (MICROZIDE) 12 5 mg capsule Take 1 capsule (12 5 mg total) by mouth daily (Patient not taking: Reported on 6/13/2022) 90 capsule 3    oxyCODONE-acetaminophen (Percocet)  mg per tablet Take 1 tablet by mouth every 6 (six) hours as needed for moderate pain Max Daily Amount: 4 tablets (Patient not taking: Reported on 6/13/2022) 120 tablet 0     No current facility-administered medications for this visit       Review of Systems   Genitourinary: Positive for hematuria  All other systems reviewed and are negative  Objective:      /82   Pulse 60   Temp 98 8 °F (37 1 °C)   Resp 18   Ht 5' 4" (1 626 m)   Wt 71 4 kg (157 lb 6 4 oz)   SpO2 95%   BMI 27 02 kg/m²          Physical Exam  Vitals and nursing note reviewed  Constitutional:       Appearance: Normal appearance  She is normal weight  Cardiovascular:      Rate and Rhythm: Normal rate and regular rhythm  Pulses: Normal pulses  Heart sounds: Normal heart sounds  Pulmonary:      Effort: Pulmonary effort is normal       Breath sounds: Normal breath sounds  Abdominal:      General: Abdomen is flat  Bowel sounds are normal       Palpations: Abdomen is soft  Musculoskeletal:         General: Normal range of motion  Cervical back: Normal range of motion and neck supple  Skin:     General: Skin is warm and dry  Capillary Refill: Capillary refill takes less than 2 seconds  Neurological:      General: No focal deficit present  Mental Status: She is alert and oriented to person, place, and time  Mental status is at baseline  Psychiatric:         Mood and Affect: Mood normal          Behavior: Behavior normal          Thought Content:  Thought content normal          Judgment: Judgment normal

## 2022-06-13 NOTE — PATIENT INSTRUCTIONS
Medicare Preventive Visit Patient Instructions  Thank you for completing your Welcome to Medicare Visit or Medicare Annual Wellness Visit today  Your next wellness visit will be due in one year (6/14/2023)  The screening/preventive services that you may require over the next 5-10 years are detailed below  Some tests may not apply to you based off risk factors and/or age  Screening tests ordered at today's visit but not completed yet may show as past due  Also, please note that scanned in results may not display below  Preventive Screenings:  Service Recommendations Previous Testing/Comments   Colorectal Cancer Screening  * Colonoscopy    * Fecal Occult Blood Test (FOBT)/Fecal Immunochemical Test (FIT)  * Fecal DNA/Cologuard Test  * Flexible Sigmoidoscopy Age: 54-65 years old   Colonoscopy: every 10 years (may be performed more frequently if at higher risk)  OR  FOBT/FIT: every 1 year  OR  Cologuard: every 3 years  OR  Sigmoidoscopy: every 5 years  Screening may be recommended earlier than age 48 if at higher risk for colorectal cancer  Also, an individualized decision between you and your healthcare provider will decide whether screening between the ages of 74-80 would be appropriate  Colonoscopy: 08/09/2018  FOBT/FIT: Not on file  Cologuard: Not on file  Sigmoidoscopy: Not on file    Screening Current     Breast Cancer Screening Age: 36 years old  Frequency: every 1-2 years  Not required if history of left and right mastectomy Mammogram: 12/20/2021    Screening Current   Cervical Cancer Screening Between the ages of 21-29, pap smear recommended once every 3 years  Between the ages of 33-67, can perform pap smear with HPV co-testing every 5 years     Recommendations may differ for women with a history of total hysterectomy, cervical cancer, or abnormal pap smears in past  Pap Smear: 11/14/2018    Screening Not Indicated  History Cervical Cancer   Hepatitis C Screening Once for adults born between 1945 and 1965  More frequently in patients at high risk for Hepatitis C Hep C Antibody: 01/08/2019    Screening Current   Diabetes Screening 1-2 times per year if you're at risk for diabetes or have pre-diabetes Fasting glucose: 106 mg/dL   A1C: 5 9 %        Cholesterol Screening Once every 5 years if you don't have a lipid disorder  May order more often based on risk factors  Lipid panel: 01/24/2022    Screening Not Indicated  History Lipid Disorder     Other Preventive Screenings Covered by Medicare:  1  Abdominal Aortic Aneurysm (AAA) Screening: covered once if your at risk  You're considered to be at risk if you have a family history of AAA  2  Lung Cancer Screening: covers low dose CT scan once per year if you meet all of the following conditions: (1) Age 50-69; (2) No signs or symptoms of lung cancer; (3) Current smoker or have quit smoking within the last 15 years; (4) You have a tobacco smoking history of at least 30 pack years (packs per day multiplied by number of years you smoked); (5) You get a written order from a healthcare provider  3  Glaucoma Screening: covered annually if you're considered high risk: (1) You have diabetes OR (2) Family history of glaucoma OR (3)  aged 48 and older OR (3)  American aged 72 and older  3  Osteoporosis Screening: covered every 2 years if you meet one of the following conditions: (1) You're estrogen deficient and at risk for osteoporosis based off medical history and other findings; (2) Have a vertebral abnormality; (3) On glucocorticoid therapy for more than 3 months; (4) Have primary hyperparathyroidism; (5) On osteoporosis medications and need to assess response to drug therapy  · Last bone density test (DXA Scan): 01/18/2022   5  HIV Screening: covered annually if you're between the age of 15-65  Also covered annually if you are younger than 13 and older than 72 with risk factors for HIV infection   For pregnant patients, it is covered up to 3 times per pregnancy  Immunizations:  Immunization Recommendations   Influenza Vaccine Annual influenza vaccination during flu season is recommended for all persons aged >= 6 months who do not have contraindications   Pneumococcal Vaccine (Prevnar and Pneumovax)  * Prevnar = PCV13  * Pneumovax = PPSV23   Adults 25-60 years old: 1-3 doses may be recommended based on certain risk factors  Adults 72 years old: Prevnar (PCV13) vaccine recommended followed by Pneumovax (PPSV23) vaccine  If already received PPSV23 since turning 65, then PCV13 recommended at least one year after PPSV23 dose  Hepatitis B Vaccine 3 dose series if at intermediate or high risk (ex: diabetes, end stage renal disease, liver disease)   Tetanus (Td) Vaccine - COST NOT COVERED BY MEDICARE PART B Following completion of primary series, a booster dose should be given every 10 years to maintain immunity against tetanus  Td may also be given as tetanus wound prophylaxis  Tdap Vaccine - COST NOT COVERED BY MEDICARE PART B Recommended at least once for all adults  For pregnant patients, recommended with each pregnancy  Shingles Vaccine (Shingrix) - COST NOT COVERED BY MEDICARE PART B  2 shot series recommended in those aged 48 and above     Health Maintenance Due:      Topic Date Due    Lung Cancer Screening  06/22/2022    Breast Cancer Screening: Mammogram  12/20/2022    Colorectal Cancer Screening  08/09/2023    Hepatitis C Screening  Completed     Immunizations Due:      Topic Date Due    COVID-19 Vaccine (1) Never done    Influenza Vaccine (Season Ended) 09/01/2022     Advance Directives   What are advance directives? Advance directives are legal documents that state your wishes and plans for medical care  These plans are made ahead of time in case you lose your ability to make decisions for yourself  Advance directives can apply to any medical decision, such as the treatments you want, and if you want to donate organs     What are the types of advance directives? There are many types of advance directives, and each state has rules about how to use them  You may choose a combination of any of the following:  · Living will: This is a written record of the treatment you want  You can also choose which treatments you do not want, which to limit, and which to stop at a certain time  This includes surgery, medicine, IV fluid, and tube feedings  · Durable power of  for healthcare [facility]): This is a written record that states who you want to make healthcare choices for you when you are unable to make them for yourself  This person, called a proxy, is usually a family member or a friend  You may choose more than 1 proxy  · Do not resuscitate (DNR) order:  A DNR order is used in case your heart stops beating or you stop breathing  It is a request not to have certain forms of treatment, such as CPR  A DNR order may be included in other types of advance directives  · Medical directive: This covers the care that you want if you are in a coma, near death, or unable to make decisions for yourself  You can list the treatments you want for each condition  Treatment may include pain medicine, surgery, blood transfusions, dialysis, IV or tube feedings, and a ventilator (breathing machine)  · Values history: This document has questions about your views, beliefs, and how you feel and think about life  This information can help others choose the care that you would choose  Why are advance directives important? An advance directive helps you control your care  Although spoken wishes may be used, it is better to have your wishes written down  Spoken wishes can be misunderstood, or not followed  Treatments may be given even if you do not want them  An advance directive may make it easier for your family to make difficult choices about your care     Cigarette Smoking and Your Health   Risks to your health if you smoke:  Nicotine and other chemicals found in tobacco damage every cell in your body  Even if you are a light smoker, you have an increased risk for cancer, heart disease, and lung disease  If you are pregnant or have diabetes, smoking increases your risk for complications  Benefits to your health if you stop smoking:   · You decrease respiratory symptoms such as coughing, wheezing, and shortness of breath  · You reduce your risk for cancers of the lung, mouth, throat, kidney, bladder, pancreas, stomach, and cervix  If you already have cancer, you increase the benefits of chemotherapy  You also reduce your risk for cancer returning or a second cancer from developing  · You reduce your risk for heart disease, blood clots, heart attack, and stroke  · You reduce your risk for lung infections, and diseases such as pneumonia, asthma, chronic bronchitis, and emphysema  · Your circulation improves  More oxygen can be delivered to your body  If you have diabetes, you lower your risk for complications, such as kidney, artery, and eye diseases  You also lower your risk for nerve damage  Nerve damage can lead to amputations, poor vision, and blindness  · You improve your body's ability to heal and to fight infections  For more information and support to stop smoking:   · [employer]  Phone: 8- 333 - 681-5001  Web Address: www [employer]  Weight Management   Why it is important to manage your weight:  Being overweight increases your risk of health conditions such as heart disease, high blood pressure, type 2 diabetes, and certain types of cancer  It can also increase your risk for osteoarthritis, sleep apnea, and other respiratory problems  Aim for a slow, steady weight loss  Even a small amount of weight loss can lower your risk of health problems  How to lose weight safely:  A safe and healthy way to lose weight is to eat fewer calories and get regular exercise   You can lose up about 1 pound a week by decreasing the number of calories you eat by 500 calories each day  Healthy meal plan for weight management:  A healthy meal plan includes a variety of foods, contains fewer calories, and helps you stay healthy  A healthy meal plan includes the following:  · Eat whole-grain foods more often  A healthy meal plan should contain fiber  Fiber is the part of grains, fruits, and vegetables that is not broken down by your body  Whole-grain foods are healthy and provide extra fiber in your diet  Some examples of whole-grain foods are whole-wheat breads and pastas, oatmeal, brown rice, and bulgur  · Eat a variety of vegetables every day  Include dark, leafy greens such as spinach, kale, natasha greens, and mustard greens  Eat yellow and orange vegetables such as carrots, sweet potatoes, and winter squash  · Eat a variety of fruits every day  Choose fresh or canned fruit (canned in its own juice or light syrup) instead of juice  Fruit juice has very little or no fiber  · Eat low-fat dairy foods  Drink fat-free (skim) milk or 1% milk  Eat fat-free yogurt and low-fat cottage cheese  Try low-fat cheeses such as mozzarella and other reduced-fat cheeses  · Choose meat and other protein foods that are low in fat  Choose beans or other legumes such as split peas or lentils  Choose fish, skinless poultry (chicken or turkey), or lean cuts of red meat (beef or pork)  Before you cook meat or poultry, cut off any visible fat  · Use less fat and oil  Try baking foods instead of frying them  Add less fat, such as margarine, sour cream, regular salad dressing and mayonnaise to foods  Eat fewer high-fat foods  Some examples of high-fat foods include french fries, doughnuts, ice cream, and cakes  · Eat fewer sweets  Limit foods and drinks that are high in sugar  This includes candy, cookies, regular soda, and sweetened drinks  Exercise:  Exercise at least 30 minutes per day on most days of the week   Some examples of exercise include walking, biking, dancing, and swimming  You can also fit in more physical activity by taking the stairs instead of the elevator or parking farther away from stores  Ask your healthcare provider about the best exercise plan for you  Narcotic (Opioid) Safety    Use narcotics safely:  · Take prescribed narcotics exactly as directed  · Do not give narcotics to others or take narcotics that belong to someone else  · Do not mix narcotics without medicines or alcohol  · Do not drive or operate heavy machinery after you take the narcotic  · Monitor for side effects and notify your healthcare provider if you experienced side effects such as nausea, sleepiness, itching, or trouble thinking clearly  Manage constipation:    Constipation is the most common side effect of narcotic medicine  Constipation is when you have hard, dry bowel movements, or you go longer than usual between bowel movements  Tell your healthcare provider about all changes in your bowel movements while you are taking narcotics  He or she may recommend laxative medicine to help you have a bowel movement  He or she may also change the kind of narcotic you are taking, or change when you take it  The following are more ways you can prevent or relieve constipation:    · Drink liquids as directed  You may need to drink extra liquids to help soften and move your bowels  Ask how much liquid to drink each day and which liquids are best for you  · Eat high-fiber foods  This may help decrease constipation by adding bulk to your bowel movements  High-fiber foods include fruits, vegetables, whole-grain breads and cereals, and beans  Your healthcare provider or dietitian can help you create a high-fiber meal plan  Your provider may also recommend a fiber supplement if you cannot get enough fiber from food  · Exercise regularly  Regular physical activity can help stimulate your intestines  Walking is a good exercise to prevent or relieve constipation   Ask which exercises are best for you   · Schedule a time each day to have a bowel movement  This may help train your body to have regular bowel movements  Bend forward while you are on the toilet to help move the bowel movement out  Sit on the toilet for at least 10 minutes, even if you do not have a bowel movement  Store narcotics safely:   · Store narcotics where others cannot easily get them  Keep them in a locked cabinet or secure area  Do not  keep them in a purse or other bag you carry with you  A person may be looking for something else and find the narcotics  · Make sure narcotics are stored out of the reach of children  A child can easily overdose on narcotics  Narcotics may look like candy to a small child  The best way to dispose of narcotics: The laws vary by country and area  In the [place], the best way is to return the narcotics through a take-back program  This program is offered by the [employer]  The following are options for using the program:  · Take the narcotics to a BRITTANY collection site  The site is often a law enforcement center  Call your local law enforcement center for scheduled take-back days in your area  You will be given information on where to go if the collection site is in a different location  · Take the narcotics to an approved pharmacy or hospital   A pharmacy or hospital may be set up as a collection site  You will need to ask if it is a BRITTANY collection site if you were not directed there  A pharmacy or doctor's office may not be able to take back narcotics unless it is a BRITTANY site  · Use a mail-back system  This means you are given containers to put the narcotics into  You will then mail them in the containers  · Use a take-back drop box  This is a place to leave the narcotics at any time  People and animals will not be able to get into the box  Your local law enforcement agency can tell you where to find a drop box in your area      Other ways to manage pain: · Ask your healthcare provider about non-narcotic medicines to control pain  Nonprescription medicines include NSAIDs (such as ibuprofen) and acetaminophen  Prescription medicines include muscle relaxers, antidepressants, and steroids  · Pain may be managed without any medicines  Some ways to relieve pain include massage, aromatherapy, or meditation  Physical or occupational therapy may also help  For more information:   · Drug Enforcement Administration  [address] [facility] [address]  Phone: 1- 956 - 740-4858  Web Address: [facility]/drug_disposal/    · [address] and Drug Administration  Edmond Jyoti  [place] [address] [facility]  Phone: 5- 762 - 967-7524  Web Address: http://[employer]/     © Copyright [employer] 2018 Information is for End User's use only and may not be sold, redistributed or otherwise used for commercial purposes   All illustrations and images included in CareNotes® are the copyrighted property of A D A M , Inc  or [address] [place] [address]

## 2022-06-13 NOTE — PROGRESS NOTES
Assessment and Plan:     Problem List Items Addressed This Visit    None     Visit Diagnoses     Encounter for screening for lung cancer        Personal history of nicotine dependence               Preventive health issues were discussed with patient, and age appropriate screening tests were ordered as noted in patient's After Visit Summary  Personalized health advice and appropriate referrals for health education or preventive services given if needed, as noted in patient's After Visit Summary       History of Present Illness:     Patient presents for Medicare Annual Wellness visit    Patient Care Team:  Amilcar Diggs MD as PCP - General  Amilcar Diggs MD as PCP - [address] [place] [address] ([place])  [facility]     Problem List:     Patient Active Problem List   Diagnosis    Essential hypertension    Other hyperlipidemia    Screening for genitourinary condition    Screening for neurological condition    Hyperlipidemia    Hypertension    Asymptomatic microscopic hematuria    Rash of face    Pulmonary nodules    Lumbar disc disease    Cytologic evidence of malignancy on smear of cervix    COPD with acute exacerbation ([address] )    HTN, goal below 140/90    Inflammation of sacroiliac joint ([address] )    Thyroid nodule    Chronic pain of right knee    Chronic groin pain, right    BMI 26 0-26 9,adult    Smoker    Medicare annual wellness visit, subsequent    Clotting disorder ([address] )    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 ([address] )    Opioid dependence, uncomplicated ([address] )    Recurrent major depressive disorder, in partial remission ([address] )    Atherosclerosis of abdominal aorta ([address] )    High risk medication use    Centrilobular emphysema ([address] )    Cervical ca [facility]    Refused influenza vaccine      Past Medical and Surgical History:     Past Medical History:   Diagnosis Date    Elevated liver enzymes     last assessed: 12/27/2016    Fracture of rib     last assessed: 08/31/2015    HPV (human papilloma virus) infection     Osteoporosis     Pulmonary nodule     Thyroid nodule      Past Surgical History:   Procedure Laterality Date    APPENDECTOMY      last assessed: 08/31/2015    BACK SURGERY      last assessed: 08/31/2015    CATARACT EXTRACTION      CHOLECYSTECTOMY      last assessed: 08/31/2015    COLONOSCOPY W/ BIOPSIES      COLPOSCOPY      FOOT SURGERY      last assessed: 08/31/2015    TOTAL ABDOMINAL HYSTERECTOMY      last assessed: 08/31/2015; unilateral oopherectomy    TOTAL HIP ARTHROPLASTY Right     last assessed: 08/31/2015; [address]    US GUIDED THYROID BIOPSY        Family History:     Family History   Problem Relation Age of Onset    Heart disease Mother         cardiac disorder    Diabetes type II Mother     Heart disease Father         cardiac disorder       Social History:     Social History     Socioeconomic History    Marital status:       Spouse name: None    Number of children: None    Years of education: None    Highest education level: None   Occupational History    None   Tobacco Use    Smoking status: Current Every Day Smoker     Packs/day: 1 00     Years: 35 00     Pack years: 35 00     Types: Cigarettes    Smokeless tobacco: Never Used   Substance and Sexual Activity    Alcohol use: No    Drug use: No    Sexual activity: None   Other Topics Concern    None   Social History Narrative    None     Social Determinants of Health     Financial Resource Strain: Not on file   Food Insecurity: Not on file   Transportation Needs: Not on file   Physical Activity: Not on file   Stress: Not on file   Social Connections: Not on file   Intimate Partner Violence: Not on file   Housing Stability: Not on file      Medications and Allergies:     Current Outpatient Medications   Medication Sig Dispense Refill    Ascorbic Acid (vitamin C) 100 MG tablet Take 100 mg by mouth daily      atenolol (TENORMIN) 25 mg tablet Take 2 tablets (50 mg total) by mouth daily 180 tablet 3    atorvastatin (LIPITOR) 40 mg tablet Take 1 tablet (40 mg total) by mouth daily 90 tablet 3    azelastine (ASTELIN) 0 1 % nasal spray 1 spray into each nostril 2 (two) times a day Use in each nostril as directed 30 mL 5    Calcium 500-125 MG-UNIT TABS Take 1 tablet by mouth in the morning      carBAMazepine (TEGretol) 100 mg chewable tablet Chew 1 tablet (100 mg total) 3 (three) times a day 30 tablet 0    cholecalciferol (VITAMIN D3) 400 units tablet Take 400 Units by mouth daily      Combivent Respimat inhaler Inhale 1 puff 4 (four) times a day 4 g 5    diazepam (VALIUM) 5 mg tablet take 1 tablet by mouth three times a day if needed for anxiety 90 tablet 5    HYDROcodone-acetaminophen (Lorcet HD)  mg per tablet Take 1 tablet by mouth every 6 (six) hours as needed for moderate pain Max Daily Amount: 4 tablets (Patient not taking: Reported on 6/13/2022) 120 tablet 0    Magnesium 500 MG TABS Take 1 tablet by mouth daily      nystatin (MYCOSTATIN) 500,000 units/5 mL suspension Apply 5 mL (500,000 Units total) to the mouth or throat 4 (four) times a day 60 mL 5    Specialty Vitamins Products (magnesium, amino acid chelate,) 133 MG tablet Take 1 tablet by mouth 2 (two) times a day      Stiolto Respimat 2 5-2 5 MCG/ACT inhaler inhale 1 puff by mouth daily 12 g 5    venlafaxine (EFFEXOR-XR) 75 mg 24 hr capsule take 1 capsule by mouth daily with breakfast 30 capsule 5    hydrochlorothiazide (MICROZIDE) 12 5 mg capsule Take 1 capsule (12 5 mg total) by mouth daily (Patient not taking: Reported on 6/13/2022) 90 capsule 3    oxyCODONE-acetaminophen (Percocet)  mg per tablet Take 1 tablet by mouth every 6 (six) hours as needed for moderate pain Max Daily Amount: 4 tablets (Patient not taking: Reported on 6/13/2022) 120 tablet 0     No current facility-administered medications for this visit       No Known Allergies   Immunizations:     Immunization History Administered Date(s) Administered    H1N1, All Formulations 03/19/2010    INFLUENZA 01/14/2013, 11/03/2015, 12/15/2017, 11/14/2018    Influenza Quadrivalent Preservative Free 3 years and older IM 11/03/2015    Influenza, high dose seasonal 0 7 mL 11/14/2018, 11/19/2019    Influenza, seasonal, injectable 12/15/2017    Pneumococcal Conjugate 13-Valent 12/21/2017    Pneumococcal Polysaccharide PPV23 07/23/2013, 11/03/2015    Tdap 07/23/2013    Tuberculin Skin Test-PPD Intradermal 09/17/2013    Zoster 11/03/2015      Health Maintenance:         Topic Date Due    Lung Cancer Screening  06/22/2022    Breast Cancer Screening: Mammogram  12/20/2022    Colorectal Cancer Screening  08/09/2023    Hepatitis C Screening  Completed         Topic Date Due    COVID-19 Vaccine (1) Never done    Influenza Vaccine (Season Ended) 09/01/2022      Medicare Health Risk Assessment:     /82   Pulse 60   Temp 98 8 °F (37 1 °C)   Resp 18   Ht 5' 4" (1 626 m)   Wt 71 4 kg (157 lb 6 4 oz)   SpO2 95%   BMI 27 02 kg/m²      Last Medicare Wellness visit information reviewed, patient interviewed and updates made to the record today  Health Risk Assessment:   Patient rates overall health as good  Patient feels that their physical health rating is same  Patient is satisfied with their life  Eyesight was rated as slightly worse  Hearing was rated as same  Patient feels that their emotional and mental health rating is same  Patients states they are never, rarely angry  Patient states they are often unusually tired/fatigued  Pain experienced in the last 7 days has been a lot  Patient's pain rating has been 8/10  Patient states that she has experienced no weight loss or gain in last 6 months  Depression Screening:   PHQ-9 Score: 1      Fall Risk Screening:    In the past year, patient has experienced: no history of falling in past year      Urinary Incontinence Screening:   Patient has not leaked urine accidently in the last six months  Home Safety:  Patient has trouble with stairs inside or outside of their home  Patient has working smoke alarms and has working carbon monoxide detector  Home safety hazards include: none  Nutrition:   Current diet is Regular  Medications:   Patient is currently taking over-the-counter supplements  OTC medications include: see medication list  Patient is able to manage medications  Activities of Daily Living (ADLs)/Instrumental Activities of Daily Living (IADLs):   Walk and transfer into and out of bed and chair?: Yes  Dress and groom yourself?: Yes    Bathe or shower yourself?: Yes    Feed yourself? Yes  Do your laundry/housekeeping?: Yes  Manage your money, pay your bills and track your expenses?: Yes  Make your own meals?: Yes    Do your own shopping?: Yes    Previous Hospitalizations:   Any hospitalizations or ED visits within the last 12 months?: No      Advance Care Planning:   Living will: Yes    Durable POA for healthcare: Yes    Advanced directive: Yes      Cognitive Screening:   Provider or family/friend/caregiver concerned regarding cognition?: No    PREVENTIVE SCREENINGS      Cardiovascular Screening:    General: Screening Not Indicated and History Lipid Disorder      Colorectal Cancer Screening:     General: Screening Current      Breast Cancer Screening:     General: Screening Current      Cervical Cancer Screening:    General: Screening Not Indicated and History Cervical Cancer      Osteoporosis Screening:    General: Screening Current      Abdominal Aortic Aneurysm (AAA) Screening:        General: Screening Not Indicated and History AAA      Lung Cancer Screening:     General: Screening Current      Hepatitis C Screening:    General: Screening Current    Screening, Brief Intervention, and Referral to Treatment (SBIRT)    Screening  Typical number of drinks in a day: 0  Typical number of drinks in a week: 0  Interpretation: Low risk drinking behavior      Single Item Drug Screening:  How often have you used an illegal drug (including marijuana) or a prescription medication for non-medical reasons in the past year? never    Single Item Drug Screen Score: 0  Interpretation: Negative screen for possible drug use disorder    Review of Current Opioid Use    Opioid Risk Tool (ORT) Interpretation: Complete Opioid Risk Tool (ORT)      Sloan Guallpa MD

## 2022-06-13 NOTE — PROGRESS NOTES
Assessment/Plan     Problem List Items Addressed This Visit        Respiratory    COPD with acute exacerbation (HCC)    Relevant Medications    simethicone (Gas-X Ultra Strength) 180 MG capsule    Other Relevant Orders    CBC and differential    Comprehensive metabolic panel    TSH, 3rd generation    Lipid panel    UA (URINE) with reflex to Scope    Centrilobular emphysema (HCC)    Relevant Medications    simethicone (Gas-X Ultra Strength) 180 MG capsule    Other Relevant Orders    CBC and differential    Comprehensive metabolic panel    TSH, 3rd generation    Lipid panel    UA (URINE) with reflex to Scope       Cardiovascular and Mediastinum    Atherosclerosis of abdominal aorta (HCC)    Relevant Medications    simethicone (Gas-X Ultra Strength) 180 MG capsule    Other Relevant Orders    CBC and differential    Comprehensive metabolic panel    TSH, 3rd generation    Lipid panel    UA (URINE) with reflex to Scope       Musculoskeletal and Integument    Inflammation of sacroiliac joint (HCC) - Primary    Relevant Medications    HYDROcodone-acetaminophen (NORCO)  mg per tablet    simethicone (Gas-X Ultra Strength) 180 MG capsule    Other Relevant Orders    CBC and differential    Comprehensive metabolic panel    TSH, 3rd generation    Lipid panel    UA (URINE) with reflex to Scope       Other    High risk medication use    Relevant Orders    [facility] All Prescribed Meds    Amphetamines, Methamphetamines    Butalbital    Phenobarbital    Secobarbital    Temazepam    Alprazolam    Clonazepam    Diazepam    Lorazepam    Oxazepam    Gabapentin    Pregabalin    Cocaine    Heroin    Buprenorphine    Levorphanol    Meperidine    Naltrexone    Fentanyl    Methadone    Oxycodone    Oxymorphone    Tapentadol    Tramadol    Codeine, Hydrocodone, Hydropmorphone, Morphine    Bath Salts    Kratom    Spice    Methylphenidate    Phentermine    Validity Oxidant    Validity Creatinine    Validity pH    Validity Specific Other Visit Diagnoses     Encounter for screening for lung cancer        Relevant Medications    simethicone (Gas-X Ultra Strength) 180 MG capsule    Other Relevant Orders    CT lung screening program    CBC and differential    Comprehensive metabolic panel    TSH, 3rd generation    Lipid panel    UA (URINE) with reflex to Scope    Personal history of nicotine dependence        Relevant Medications    simethicone (Gas-X Ultra Strength) 180 MG capsule    Other Relevant Orders    CT lung screening program    CBC and differential    Comprehensive metabolic panel    TSH, 3rd generation    Lipid panel    UA (URINE) with reflex to Scope    Spinal stenosis of lumbar region without neurogenic claudication        Relevant Medications    HYDROcodone-acetaminophen (NORCO)  mg per tablet    simethicone (Gas-X Ultra Strength) 180 MG capsule    Other Relevant Orders    CBC and differential    Comprehensive metabolic panel    TSH, 3rd generation    Lipid panel    UA (URINE) with reflex to Scope    Left hip pain        Relevant Medications    simethicone (Gas-X Ultra Strength) 180 MG capsule    Other Relevant Orders    XR hip/pelv 2-3 vws left if performed    Continuous opioid dependence (HCC)        Relevant Orders    [facility] All Prescribed Meds    Amphetamines, Methamphetamines    Butalbital    Phenobarbital    Secobarbital    Temazepam    Alprazolam    Clonazepam    Diazepam    Lorazepam    Oxazepam    Gabapentin    Pregabalin    Cocaine    Heroin    Buprenorphine    Levorphanol    Meperidine    Naltrexone    Fentanyl    Methadone    Oxycodone    Oxymorphone    Tapentadol    Tramadol    Codeine, Hydrocodone, Hydropmorphone, Morphine    Bath Salts    Kratom    Spice    Methylphenidate    Phentermine    Validity Oxidant    Validity Creatinine    Validity pH    Validity Specific    Chronic pain syndrome        Relevant Orders    [facility] All Prescribed Meds    Amphetamines, Methamphetamines    Butalbital Phenobarbital    Secobarbital    Temazepam    Alprazolam    Clonazepam    Diazepam    Lorazepam    Oxazepam    Gabapentin    Pregabalin    Cocaine    Heroin    Buprenorphine    Levorphanol    Meperidine    Naltrexone    Fentanyl    Methadone    Oxycodone    Oxymorphone    Tapentadol    Tramadol    Codeine, Hydrocodone, Hydropmorphone, Morphine    Bath Salts    Kratom    Spice    Methylphenidate    Phentermine    Validity Oxidant    Validity Creatinine    Validity pH    Validity Specific    Other long term (current) drug therapy        Relevant Orders    [facility] All Prescribed Meds    Amphetamines, Methamphetamines    Butalbital    Phenobarbital    Secobarbital    Temazepam    Alprazolam    Clonazepam    Diazepam    Lorazepam    Oxazepam    Gabapentin    Pregabalin    Cocaine    Heroin    Buprenorphine    Levorphanol    Meperidine    Naltrexone    Fentanyl    Methadone    Oxycodone    Oxymorphone    Tapentadol    Tramadol    Codeine, Hydrocodone, Hydropmorphone, Morphine    Bath Salts    Kratom    Spice    Methylphenidate    Phentermine    Validity Oxidant    Validity Creatinine    Validity pH    Validity Specific         Opioid Management Plan      Subjective     Opioid Management HPI  HPI  Pain Medications             carBAMazepine (TEGretol) 100 mg chewable tablet Chew 1 tablet (100 mg total) 3 (three) times a day    HYDROcodone-acetaminophen (Lorcet HD)  mg per tablet Take 1 tablet by mouth every 6 (six) hours as needed for moderate pain Max Daily Amount: 4 tablets    HYDROcodone-acetaminophen (NORCO)  mg per tablet Take 1 tablet by mouth every 6 (six) hours as needed for moderate pain Max Daily Amount: 4 tablets    venlafaxine (EFFEXOR-XR) 75 mg 24 hr capsule take 1 capsule by mouth daily with breakfast    oxyCODONE-acetaminophen (Percocet)  mg per tablet Take 1 tablet by mouth every 6 (six) hours as needed for moderate pain Max Daily Amount: 4 tablets         Outpatient Morphine Milligram Equivalents Per Day     6/13/22 and after 140 MME/Day    Order Name Dose Route Frequency Maximum MME/Day     oxyCODONE-acetaminophen (Percocet)  mg per tablet 1 tablet Oral Every 6 hours PRN 60 MME/Day    Patient not taking     --     HYDROcodone-acetaminophen (Lorcet HD)  mg per tablet 1 tablet Oral Every 6 hours PRN 40 MME/Day    Patient not taking     --     HYDROcodone-acetaminophen (NORCO)  mg per tablet 1 tablet Oral Every 6 hours PRN 40 MME/Day    Total Potential Morphine Milligram Equivalents Per Day 140 MME/Day    Calculation Information        oxyCODONE-acetaminophen (Percocet)  mg per tablet    The written sig was used for calculation because the patient reports that they are not taking this order  Written Sig    oxyCODONE-acetaminophen  mg Tabs: maximum daily dose of 40 mg of opioid in combo * morphine equivalence factor of 1 5 = 60 MME/Day       HYDROcodone-acetaminophen (Lorcet HD)  mg per tablet    The written sig was used for calculation because the patient reports that they are not taking this order      Written Sig    HYDROcodone-acetaminophen  mg Tabs: maximum daily dose of 40 mg of opioid in combo * morphine equivalence factor of 1 = 40 MME/Day       HYDROcodone-acetaminophen (NORCO)  mg per tablet    HYDROcodone-acetaminophen  mg Tabs: maximum daily dose of 40 mg of opioid in combo * morphine equivalence factor of 1 = 40 MME/Day                         PDMP Review       Value Time User    PDMP Reviewed  Yes 2/18/2022 11:13 AM Polina QUILES&#[id number];JOHANA Dawn         Review of Systems  Objective     /82   Pulse 60   Temp 98 8 °F (37 1 °C)   Resp 18   Ht 5' 4" (1 626 m)   Wt 71 4 kg (157 lb 6 4 oz)   SpO2 95%   BMI 27 02 kg/m²     Physical Exam    Eduardo Grewal MD

## 2022-06-16 LAB
6MAM UR QL CFM: NEGATIVE NG/ML
7AMINOCLONAZEPAM UR QL CFM: NEGATIVE NG/ML
A-OH ALPRAZ UR QL CFM: NEGATIVE NG/ML
ACCEPTABLE CREAT UR QL: NORMAL MG/DL
ACCEPTIBLE SP GR UR QL: NORMAL
AMPHET UR QL CFM: NEGATIVE NG/ML
BUPRENORPHINE UR QL CFM: NEGATIVE NG/ML
BUTALBITAL UR QL CFM: NEGATIVE NG/ML
BZE UR QL CFM: NEGATIVE NG/ML
CODEINE UR QL CFM: NEGATIVE NG/ML
EDDP UR QL CFM: NEGATIVE NG/ML
EUTYLONE UR QL: NEGATIVE NG/ML
FENTANYL UR QL CFM: NEGATIVE NG/ML
GLIADIN IGG SER IA-ACNC: NEGATIVE NG/ML
HYDROCODONE UR QL CFM: ABNORMAL NG/ML
HYDROMORPHONE UR QL CFM: ABNORMAL NG/ML
LORAZEPAM UR QL CFM: NEGATIVE NG/ML
ME-PHENIDATE UR QL CFM: NEGATIVE NG/ML
MEPERIDINE UR QL CFM: NEGATIVE NG/ML
METHADONE UR QL CFM: NEGATIVE NG/ML
METHAMPHET UR QL CFM: NEGATIVE NG/ML
MORPHINE UR QL CFM: NEGATIVE NG/ML
NALTREXONE UR QL CFM: NEGATIVE NG/ML
NITRITE UR QL: NORMAL UG/ML
NORBUPRENORPHINE UR QL CFM: NEGATIVE NG/ML
NORDIAZEPAM UR QL CFM: NORMAL NG/ML
NORFENTANYL UR QL CFM: NEGATIVE NG/ML
NORHYDROCODONE UR QL CFM: ABNORMAL NG/ML
NORMEPERIDINE UR QL CFM: NEGATIVE NG/ML
NOROXYCODONE UR QL CFM: NORMAL NG/ML
OXAZEPAM UR QL CFM: NORMAL NG/ML
OXYCODONE UR QL CFM: NORMAL NG/ML
OXYMORPHONE UR QL CFM: NORMAL NG/ML
OXYMORPHONE UR QL CFM: NORMAL NG/ML
PHENOBARB UR QL CFM: NEGATIVE NG/ML
RESULT ALL_PRESCRIBED MEDS AND SPECIAL INSTRUCTIONS: NORMAL
SECOBARBITAL UR QL CFM: NEGATIVE NG/ML
SL AMB 3-METHYL-FENTANYL QUANTIFICATION: NORMAL NG/ML
SL AMB 4-ANPP QUANTIFICATION: NORMAL NG/ML
SL AMB 4-FIBF QUANTIFICATION: NORMAL NG/ML
SL AMB 5F-ADB-M7 METABOLITE QUANTIFICATION: NEGATIVE NG/ML
SL AMB 7-OH-MITRAGYNINE (KRATOM ALKALOID) QUANTIFICATION: NEGATIVE NG/ML
SL AMB AB-FUBINACA-M3 METABOLITE QUANTIFICATION: NEGATIVE NG/ML
SL AMB ACETYL FENTANYL QUANTIFICATION: NORMAL NG/ML
SL AMB ACETYL NORFENTANYL QUANTIFICATION: NORMAL NG/ML
SL AMB ACRYL FENTANYL QUANTIFICATION: NORMAL NG/ML
SL AMB BUTRYL FENTANYL QUANTIFICATION: NORMAL NG/ML
SL AMB CARFENTANIL QUANTIFICATION: NORMAL NG/ML
SL AMB CYCLOPROPYL FENTANYL QUANTIFICATION: NORMAL NG/ML
SL AMB DEXTRORPHAN (DEXTROMETHORPHAN METABOLITE) QUANT: NEGATIVE NG/ML
SL AMB FURANYL FENTANYL QUANTIFICATION: NORMAL NG/ML
SL AMB GABAPENTIN QUANTIFICATION: NEGATIVE
SL AMB JWH018 METABOLITE QUANTIFICATION: NEGATIVE NG/ML
SL AMB JWH073 METABOLITE QUANTIFICATION: NEGATIVE NG/ML
SL AMB MDMB-FUBINACA-M1 METABOLITE QUANTIFICATION: NEGATIVE NG/ML
SL AMB METHOXYACETYL FENTANYL QUANTIFICATION: NORMAL NG/ML
SL AMB METHYLONE QUANTIFICATION: NEGATIVE NG/ML
SL AMB N-DESMETHYL U-47700 QUANTIFICATION: NORMAL NG/ML
SL AMB N-DESMETHYL-TRAMADOL QUANTIFICATION: NEGATIVE NG/ML
SL AMB PHENTERMINE QUANTIFICATION: NEGATIVE NG/ML
SL AMB PREGABALIN QUANTIFICATION: NEGATIVE
SL AMB RCS4 METABOLITE QUANTIFICATION: NEGATIVE NG/ML
SL AMB RITALINIC ACID QUANTIFICATION: NEGATIVE NG/ML
SL AMB U-47700 QUANTIFICATION: NORMAL NG/ML
SMOOTH MUSCLE AB TITR SER IF: NEGATIVE NG/ML
SPECIMEN DRAWN SERPL: NEGATIVE NG/ML
SPECIMEN PH ACCEPTABLE UR: NORMAL
TAPENTADOL UR QL CFM: NEGATIVE NG/ML
TEMAZEPAM UR QL CFM: NORMAL NG/ML
TEMAZEPAM UR QL CFM: NORMAL NG/ML
TRAMADOL UR QL CFM: NEGATIVE NG/ML
URATE/CREAT 24H UR: NEGATIVE NG/ML

## 2022-07-26 DIAGNOSIS — M54.42 BILATERAL LOW BACK PAIN WITH BILATERAL SCIATICA, UNSPECIFIED CHRONICITY: ICD-10-CM

## 2022-07-26 DIAGNOSIS — M46.1 INFLAMMATION OF SACROILIAC JOINT (HCC): ICD-10-CM

## 2022-07-26 DIAGNOSIS — M48.061 SPINAL STENOSIS OF LUMBAR REGION WITHOUT NEUROGENIC CLAUDICATION: ICD-10-CM

## 2022-07-26 DIAGNOSIS — M54.41 BILATERAL LOW BACK PAIN WITH BILATERAL SCIATICA, UNSPECIFIED CHRONICITY: ICD-10-CM

## 2022-07-26 RX ORDER — OXYCODONE AND ACETAMINOPHEN 10; 325 MG/1; MG/1
1 TABLET ORAL EVERY 6 HOURS PRN
Qty: 120 TABLET | Refills: 0 | Status: SHIPPED | OUTPATIENT
Start: 2022-07-26

## 2022-07-26 RX ORDER — HYDROCODONE BITARTRATE AND ACETAMINOPHEN 10; 325 MG/1; MG/1
1 TABLET ORAL EVERY 6 HOURS PRN
Qty: 120 TABLET | Refills: 0 | Status: SHIPPED | OUTPATIENT
Start: 2022-07-26 | End: 2022-09-05 | Stop reason: SDUPTHER

## 2022-07-26 NOTE — TELEPHONE ENCOUNTER
States oxycodone was filled instead   States she stopped that at last visit, still on med list  Did  the oxycodone but said she will take back to the pharmacy

## 2022-08-08 ENCOUNTER — TELEPHONE (OUTPATIENT)
Dept: FAMILY MEDICINE CLINIC | Facility: CLINIC | Age: 77
End: 2022-08-08

## 2022-08-08 DIAGNOSIS — R31.9 HEMATURIA, UNSPECIFIED TYPE: Primary | ICD-10-CM

## 2022-08-08 NOTE — TELEPHONE ENCOUNTER
Pt calling asking for a nephrology referral to [facility] for bleeding when she urinates   States she had her bladder checked and that was fine

## 2022-09-05 DIAGNOSIS — M46.1 INFLAMMATION OF SACROILIAC JOINT (HCC): ICD-10-CM

## 2022-09-05 DIAGNOSIS — M48.061 SPINAL STENOSIS OF LUMBAR REGION WITHOUT NEUROGENIC CLAUDICATION: ICD-10-CM

## 2022-09-05 NOTE — TELEPHONE ENCOUNTER
Medication Refill Request     Name    HYDROcodone-acetaminophen (NORCO)  mg per tablet       Dose/Frequency 1 tablet every 6 hours  Quantity 120 tablet  Verified pharmacy   [x]  Verified ordering Provider   [x]  Does patient have enough for the next 3 days?  Yes [] No [x]

## 2022-09-06 DIAGNOSIS — M46.1 INFLAMMATION OF SACROILIAC JOINT (HCC): ICD-10-CM

## 2022-09-06 DIAGNOSIS — M48.061 SPINAL STENOSIS OF LUMBAR REGION WITHOUT NEUROGENIC CLAUDICATION: ICD-10-CM

## 2022-09-06 RX ORDER — HYDROCODONE BITARTRATE AND ACETAMINOPHEN 10; 325 MG/1; MG/1
1 TABLET ORAL EVERY 6 HOURS PRN
Qty: 120 TABLET | Refills: 0 | Status: SHIPPED | OUTPATIENT
Start: 2022-09-06 | End: 2022-10-19 | Stop reason: SDUPTHER

## 2022-09-06 RX ORDER — HYDROCODONE BITARTRATE AND ACETAMINOPHEN 10; 325 MG/1; MG/1
1 TABLET ORAL EVERY 6 HOURS PRN
Qty: 120 TABLET | Refills: 0 | OUTPATIENT
Start: 2022-09-06

## 2022-09-14 DIAGNOSIS — I10 ESSENTIAL HYPERTENSION: ICD-10-CM

## 2022-09-14 RX ORDER — ATENOLOL 25 MG/1
TABLET ORAL
Qty: 180 TABLET | Refills: 3 | Status: SHIPPED | OUTPATIENT
Start: 2022-09-14

## 2022-10-13 DIAGNOSIS — J44.9 CHRONIC OBSTRUCTIVE PULMONARY DISEASE, UNSPECIFIED COPD TYPE (HCC): ICD-10-CM

## 2022-10-13 RX ORDER — TIOTROPIUM BROMIDE AND OLODATEROL 3.124; 2.736 UG/1; UG/1
SPRAY, METERED RESPIRATORY (INHALATION)
Qty: 12 G | Refills: 5 | Status: SHIPPED | OUTPATIENT
Start: 2022-10-13

## 2022-10-19 DIAGNOSIS — M46.1 INFLAMMATION OF SACROILIAC JOINT (HCC): ICD-10-CM

## 2022-10-19 DIAGNOSIS — M48.061 SPINAL STENOSIS OF LUMBAR REGION WITHOUT NEUROGENIC CLAUDICATION: ICD-10-CM

## 2022-10-19 RX ORDER — HYDROCODONE BITARTRATE AND ACETAMINOPHEN 10; 325 MG/1; MG/1
1 TABLET ORAL EVERY 6 HOURS PRN
Qty: 120 TABLET | Refills: 0 | Status: SHIPPED | OUTPATIENT
Start: 2022-10-19

## 2022-11-04 ENCOUNTER — RA CDI HCC (OUTPATIENT)
Dept: OTHER | Facility: HOSPITAL | Age: 77
End: 2022-11-04

## 2022-11-04 NOTE — PROGRESS NOTES
[facility]  coding opportunities       Chart reviewed, no opportunity found:   [address]        Patients Insurance     Medicare Insurance: [employer]

## 2022-11-07 ENCOUNTER — OFFICE VISIT (OUTPATIENT)
Dept: FAMILY MEDICINE CLINIC | Facility: CLINIC | Age: 77
End: 2022-11-07

## 2022-11-07 VITALS
DIASTOLIC BLOOD PRESSURE: 62 MMHG | SYSTOLIC BLOOD PRESSURE: 131 MMHG | OXYGEN SATURATION: 94 % | HEIGHT: 64 IN | WEIGHT: 153.2 LBS | RESPIRATION RATE: 18 BRPM | HEART RATE: 61 BPM | TEMPERATURE: 97.3 F | BODY MASS INDEX: 26.15 KG/M2

## 2022-11-07 DIAGNOSIS — I10 ESSENTIAL HYPERTENSION: ICD-10-CM

## 2022-11-07 DIAGNOSIS — J44.1 COPD WITH ACUTE EXACERBATION (HCC): ICD-10-CM

## 2022-11-07 DIAGNOSIS — R53.83 OTHER FATIGUE: ICD-10-CM

## 2022-11-07 DIAGNOSIS — Z12.31 ENCOUNTER FOR SCREENING MAMMOGRAM FOR BREAST CANCER: Primary | ICD-10-CM

## 2022-11-07 DIAGNOSIS — F33.1 MODERATE EPISODE OF RECURRENT MAJOR DEPRESSIVE DISORDER (HCC): ICD-10-CM

## 2022-11-07 RX ORDER — OMEPRAZOLE 40 MG/1
40 CAPSULE, DELAYED RELEASE ORAL DAILY
COMMUNITY
Start: 2022-10-21

## 2022-11-07 RX ORDER — VENLAFAXINE HYDROCHLORIDE 150 MG/1
150 CAPSULE, EXTENDED RELEASE ORAL DAILY
Qty: 90 CAPSULE | Refills: 3 | Status: SHIPPED | OUTPATIENT
Start: 2022-11-07

## 2022-11-07 NOTE — PROGRESS NOTES
Name: Maxim Rendon      :       MRN: [id number]  Encounter Provider: Kristyn Dial MD  Encounter Date: 2022   Encounter department: [address] [place] [address]     1  Encounter for screening mammogram for breast cancer  -     Mammo screening bilateral w 3d & cad; Future; Expected date: 2022  -     Lipid panel; Future  -     Comprehensive metabolic panel; Future; Expected date: 2022  -     TSH, 3rd generation; Future  -     Vitamin B12; Future  -     Vitamin D 25 hydroxy; Future  -     CBC and differential; Future    2  Other fatigue  -     CBC and differential; Future  -     Comprehensive metabolic panel; Future; Expected date: 2022  -     TSH, 3rd generation; Future  -     Vitamin D 25 hydroxy; Future  -     Vitamin B12; Future  -     Lipid panel; Future  -     Comprehensive metabolic panel; Future; Expected date: 2022  -     TSH, 3rd generation; Future  -     Vitamin B12; Future  -     Vitamin D 25 hydroxy; Future  -     CBC and differential; Future    3  Essential hypertension  -     Lipid panel; Future  -     Comprehensive metabolic panel; Future; Expected date: 2022  -     TSH, 3rd generation; Future  -     Vitamin B12; Future  -     Vitamin D 25 hydroxy; Future  -     CBC and differential; Future    4  COPD with acute exacerbation ([facility] [address] )  -     Lipid panel; Future  -     Comprehensive metabolic panel; Future; Expected date: 2022  -     TSH, 3rd generation; Future  -     Vitamin B12; Future  -     Vitamin D 25 hydroxy; Future  -     CBC and differential; Future    5  Moderate episode of recurrent major depressive disorder (HCC)  -     venlafaxine (EFFEXOR-XR) 150 mg 24 hr capsule; Take 1 capsule (150 mg total) by mouth daily  -     Ambulatory Referral to [facility]; Future           Subjective      She has severe low back pain  She was doing well with steroid injections but she is having difficulties with transportation  She is taking hydrocodone with fair relief and no side effects  She had hematuria  It has resolved  She had normal imaging except for some renal cysts  She saw urology  She has COPD  Her breathing is stable  She denies cough or sputum production  She has no wheezing  Her BP is under good control  She has no CP or SOB  She has no HA or vision changes  Her lipids are at goal on her current regimen  She has no myalgia or muscle weakness  She has normal LFTs  I reviewed all of her recent labs and imaging studies with her  Review of Systems   All other systems reviewed and are negative        Current Outpatient Medications on File Prior to Visit   Medication Sig   • Ascorbic Acid (vitamin C) 100 MG tablet Take 100 mg by mouth daily   • atenolol (TENORMIN) 25 mg tablet take 2 tablet by mouth once daily   • atorvastatin (LIPITOR) 40 mg tablet Take 1 tablet (40 mg total) by mouth daily   • azelastine (ASTELIN) 0 1 % nasal spray 1 spray into each nostril 2 (two) times a day Use in each nostril as directed   • Calcium 500-125 MG-UNIT TABS Take 1 tablet by mouth in the morning   • carBAMazepine (TEGretol) 100 mg chewable tablet Chew 1 tablet (100 mg total) 3 (three) times a day   • cholecalciferol (VITAMIN D3) 400 units tablet Take 400 Units by mouth daily   • Combivent Respimat inhaler Inhale 1 puff 4 (four) times a day   • diazepam (VALIUM) 5 mg tablet take 1 tablet by mouth three times a day if needed for anxiety   • HYDROcodone-acetaminophen (NORCO)  mg per tablet Take 1 tablet by mouth every 6 (six) hours as needed for moderate pain Max Daily Amount: 4 tablets   • Magnesium 500 MG TABS Take 1 tablet by mouth daily   • nystatin (MYCOSTATIN) 500,000 units/5 mL suspension Apply 5 mL (500,000 Units total) to the mouth or throat 4 (four) times a day   • omeprazole (PriLOSEC) 40 MG capsule Take 40 mg by mouth in the morning   • simethicone (Gas-X Ultra Strength) 180 MG capsule Take 1 capsule (180 mg total) by mouth every 6 (six) hours as needed for flatulence   • Specialty Vitamins Products (magnesium, amino acid chelate,) 133 MG tablet Take 1 tablet by mouth 2 (two) times a day   • Stiolto Respimat 2 5-2 5 MCG/ACT inhaler inhale 1 puff by mouth daily   • [DISCONTINUED] venlafaxine (EFFEXOR-XR) 75 mg 24 hr capsule take 1 capsule by mouth daily with breakfast   • oxyCODONE-acetaminophen (Percocet)  mg per tablet Take 1 tablet by mouth every 6 (six) hours as needed for moderate pain Max Daily Amount: 4 tablets (Patient not taking: Reported on 11/7/2022)   • [DISCONTINUED] hydrochlorothiazide (MICROZIDE) 12 5 mg capsule Take 1 capsule (12 5 mg total) by mouth daily (Patient not taking: No sig reported)   • [DISCONTINUED] HYDROcodone-acetaminophen (Lorcet HD)  mg per tablet Take 1 tablet by mouth every 6 (six) hours as needed for moderate pain Max Daily Amount: 4 tablets (Patient not taking: No sig reported)       Objective     /62 (BP Location: Left arm, Patient Position: Sitting, Cuff Size: Standard)   Pulse 61   Temp (!) 97 3 °F (36 3 °C) (Temporal)   Resp 18   Ht 5' 4" (1 626 m)   Wt 69 5 kg (153 lb 3 2 oz)   SpO2 94%   BMI 26 30 kg/m²     Physical Exam  Vitals and nursing note reviewed  Constitutional:       Appearance: Normal appearance  She is normal weight  HENT:      Head: Normocephalic and atraumatic  Cardiovascular:      Rate and Rhythm: Normal rate  Pulses: Normal pulses  Heart sounds: Normal heart sounds  Pulmonary:      Effort: Pulmonary effort is normal       Breath sounds: Normal breath sounds  Abdominal:      General: Abdomen is flat  Bowel sounds are normal       Palpations: Abdomen is soft  Musculoskeletal:         General: Normal range of motion  Cervical back: Normal range of motion and neck supple  Skin:     General: Skin is warm and dry  Capillary Refill: Capillary refill takes less than 2 seconds     Neurological: General: No focal deficit present  Mental Status: She is alert and oriented to person, place, and time  Mental status is at baseline  Psychiatric:         Mood and Affect: Mood normal          Behavior: Behavior normal          Thought Content:  Thought content normal          Judgment: Judgment normal        Mike Downs MD

## 2022-11-29 DIAGNOSIS — M46.1 INFLAMMATION OF SACROILIAC JOINT (HCC): ICD-10-CM

## 2022-11-29 DIAGNOSIS — M48.061 SPINAL STENOSIS OF LUMBAR REGION WITHOUT NEUROGENIC CLAUDICATION: ICD-10-CM

## 2022-11-29 RX ORDER — HYDROCODONE BITARTRATE AND ACETAMINOPHEN 10; 325 MG/1; MG/1
1 TABLET ORAL EVERY 6 HOURS PRN
Qty: 120 TABLET | Refills: 0 | Status: SHIPPED | OUTPATIENT
Start: 2022-11-29

## 2022-12-27 DIAGNOSIS — F41.9 ANXIETY: ICD-10-CM

## 2022-12-27 RX ORDER — DIAZEPAM 5 MG/1
5 TABLET ORAL 3 TIMES DAILY PRN
Qty: 90 TABLET | Refills: 5 | Status: SHIPPED | OUTPATIENT
Start: 2022-12-27

## 2023-01-06 DIAGNOSIS — M54.41 BILATERAL LOW BACK PAIN WITH BILATERAL SCIATICA, UNSPECIFIED CHRONICITY: ICD-10-CM

## 2023-01-06 DIAGNOSIS — M54.42 BILATERAL LOW BACK PAIN WITH BILATERAL SCIATICA, UNSPECIFIED CHRONICITY: ICD-10-CM

## 2023-01-06 RX ORDER — OXYCODONE AND ACETAMINOPHEN 10; 325 MG/1; MG/1
1 TABLET ORAL EVERY 6 HOURS PRN
Qty: 120 TABLET | Refills: 0 | Status: SHIPPED | OUTPATIENT
Start: 2023-01-06 | End: 2023-01-11

## 2023-01-11 DIAGNOSIS — M48.061 SPINAL STENOSIS OF LUMBAR REGION WITHOUT NEUROGENIC CLAUDICATION: ICD-10-CM

## 2023-01-11 DIAGNOSIS — M46.1 INFLAMMATION OF SACROILIAC JOINT (HCC): ICD-10-CM

## 2023-01-11 RX ORDER — HYDROCODONE BITARTRATE AND ACETAMINOPHEN 10; 325 MG/1; MG/1
1 TABLET ORAL EVERY 6 HOURS PRN
Qty: 120 TABLET | Refills: 0 | Status: SHIPPED | OUTPATIENT
Start: 2023-01-11

## 2023-01-27 ENCOUNTER — TELEPHONE (OUTPATIENT)
Dept: PSYCHIATRY | Facility: CLINIC | Age: 78
End: 2023-01-27

## 2023-02-01 ENCOUNTER — TELEPHONE (OUTPATIENT)
Dept: PSYCHIATRY | Facility: CLINIC | Age: 78
End: 2023-02-01

## 2023-02-06 ENCOUNTER — TELEPHONE (OUTPATIENT)
Dept: PSYCHIATRY | Facility: CLINIC | Age: 78
End: 2023-02-06

## 2023-02-06 NOTE — TELEPHONE ENCOUNTER
Reached out to pt in regards to referral, pt stated at this time she does not need services and will reach back out if she changes her mind

## 2023-02-07 ENCOUNTER — RA CDI HCC (OUTPATIENT)
Dept: OTHER | Facility: HOSPITAL | Age: 78
End: 2023-02-07

## 2023-02-08 NOTE — PROGRESS NOTES
Priyanka [facility]  coding opportunities       Chart reviewed, no opportunity found:   [address]        Patients Insurance     Medicare Insurance: [employer]

## 2023-02-10 ENCOUNTER — OFFICE VISIT (OUTPATIENT)
Dept: FAMILY MEDICINE CLINIC | Facility: CLINIC | Age: 78
End: 2023-02-10

## 2023-02-10 VITALS
TEMPERATURE: 98.7 F | RESPIRATION RATE: 18 BRPM | BODY MASS INDEX: 24.92 KG/M2 | SYSTOLIC BLOOD PRESSURE: 140 MMHG | WEIGHT: 145.2 LBS | OXYGEN SATURATION: 99 % | DIASTOLIC BLOOD PRESSURE: 81 MMHG | HEART RATE: 59 BPM

## 2023-02-10 DIAGNOSIS — J44.1 COPD WITH ACUTE EXACERBATION (HCC): ICD-10-CM

## 2023-02-10 DIAGNOSIS — J43.2 CENTRILOBULAR EMPHYSEMA (HCC): ICD-10-CM

## 2023-02-10 DIAGNOSIS — E78.49 OTHER HYPERLIPIDEMIA: ICD-10-CM

## 2023-02-10 DIAGNOSIS — F11.20 CONTINUOUS OPIOID DEPENDENCE (HCC): ICD-10-CM

## 2023-02-10 DIAGNOSIS — F02.80 SEMANTIC DEMENTIA WITHOUT BEHAVIORAL DISTURBANCE (HCC): ICD-10-CM

## 2023-02-10 DIAGNOSIS — M48.061 SPINAL STENOSIS OF LUMBAR REGION WITHOUT NEUROGENIC CLAUDICATION: Primary | ICD-10-CM

## 2023-02-10 DIAGNOSIS — M46.1 INFLAMMATION OF SACROILIAC JOINT (HCC): ICD-10-CM

## 2023-02-10 DIAGNOSIS — F33.1 MODERATE EPISODE OF RECURRENT MAJOR DEPRESSIVE DISORDER (HCC): ICD-10-CM

## 2023-02-10 DIAGNOSIS — I10 ESSENTIAL HYPERTENSION: ICD-10-CM

## 2023-02-10 DIAGNOSIS — M51.9 LUMBAR DISC DISEASE: ICD-10-CM

## 2023-02-10 DIAGNOSIS — C53.9 MALIGNANT NEOPLASM OF CERVIX, UNSPECIFIED SITE (HCC): ICD-10-CM

## 2023-02-10 DIAGNOSIS — G31.09 SEMANTIC DEMENTIA WITHOUT BEHAVIORAL DISTURBANCE (HCC): ICD-10-CM

## 2023-02-10 DIAGNOSIS — D68.9 CLOTTING DISORDER (HCC): ICD-10-CM

## 2023-02-10 DIAGNOSIS — Z12.31 ENCOUNTER FOR SCREENING MAMMOGRAM FOR BREAST CANCER: ICD-10-CM

## 2023-02-10 DIAGNOSIS — I70.0 ATHEROSCLEROSIS OF ABDOMINAL AORTA (HCC): ICD-10-CM

## 2023-02-10 RX ORDER — HYDROCODONE BITARTRATE AND ACETAMINOPHEN 10; 325 MG/1; MG/1
1 TABLET ORAL EVERY 6 HOURS PRN
Qty: 120 TABLET | Refills: 0 | Status: SHIPPED | OUTPATIENT
Start: 2023-02-10

## 2023-02-10 RX ORDER — HYDROCHLOROTHIAZIDE 25 MG/1
25 TABLET ORAL DAILY
Qty: 90 TABLET | Refills: 3 | Status: SHIPPED | OUTPATIENT
Start: 2023-02-10

## 2023-02-10 NOTE — PROGRESS NOTES
Name: Melquiades Ramires      : [birth year]      MRN: [id number]  Encounter Provider: Vineet Horton MD  Encounter Date: 2/10/2023   Encounter department: [address] [place] [address]     1  Encounter for screening mammogram for breast cancer  -     Mammo screening bilateral w cad; Future; Expected date: 02/10/2023    2  Spinal stenosis of lumbar region without neurogenic claudication  -     Ambulatory Referral to Physical Therapy; Future  -     Ambulatory Referral to Neurosurgery; Future  -     HYDROcodone-acetaminophen (NORCO)  mg per tablet; Take 1 tablet by mouth every 6 (six) hours as needed for moderate pain Max Daily Amount: 4 tablets    3  Inflammation of sacroiliac joint (HCC)  -     HYDROcodone-acetaminophen (NORCO)  mg per tablet; Take 1 tablet by mouth every 6 (six) hours as needed for moderate pain Max Daily Amount: 4 tablets    4  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 ([facility] )    5  COPD with acute exacerbation ([facility] )    6  Continuous opioid dependence (HCC)  -     [facility] All Prescribed Meds and Special Instructions  -     Amphetamines, Methamphetamines  -     Butalbital  -     Phenobarbital  -     Secobarbital  -     Temazepam  -     Alprazolam  -     Clonazepam  -     Diazepam  -     Lorazepam  -     Oxazepam  -     Gabapentin  -     Pregabalin  -     Cocaine  -     Heroin  -     Buprenorphine  -     Levorphanol  -     Meperidine  -     Naltrexone  -     Fentanyl  -     Methadone  -     Oxycodone  -     Oxymorphone  -     Tapentadol  -     THC  -     Tramadol  -     Codeine, Hydrocodone, Hydropmorphone, Morphine  -     Bath Salts  -     Ethyl Glucuronide/Ethyl Sulfate  -     Kratom  -     Spice  -     Methylphenidate  -     Phentermine  -     Validity Oxidant  -     Validity Creatinine  -     Validity pH  -     Validity Specific    7  Moderate episode of recurrent major depressive disorder ([facility] )    8   Malignant neoplasm of cervix, unspecified site ([place] [address] )    9  Atherosclerosis of abdominal aorta ([place] [address] )    10  Clotting disorder ([place] [address] )    11  Centrilobular emphysema ([place] [address] )    12  Essential hypertension  -     hydrochlorothiazide (HYDRODIURIL) 25 mg tablet; Take 1 tablet (25 mg total) by mouth daily    13  Lumbar disc disease    14  Other hyperlipidemia           Subjective      She is on chronic opiate therapy  She has Narcan at home  She has adequate pain control with current regimen  She has no side effects  Her blood pressure is at goal   She has no chest pain or shortness of breath  She has no PND, orthopnea, or dyspnea on exertion  Her lipids are at goal on her current regimen  She has no increased myalgia or muscle weakness  She has normal liver function testing  She has COPD  She continues to smoke  She declined smoking cessation at this time  She has no increased cough or wheezing  She has no sputum production  She has no dyspnea  Review of Systems   All other systems reviewed and are negative        Current Outpatient Medications on File Prior to Visit   Medication Sig   • Ascorbic Acid (vitamin C) 100 MG tablet Take 100 mg by mouth daily   • atenolol (TENORMIN) 25 mg tablet take 2 tablet by mouth once daily   • atorvastatin (LIPITOR) 40 mg tablet Take 1 tablet (40 mg total) by mouth daily   • azelastine (ASTELIN) 0 1 % nasal spray 1 spray into each nostril 2 (two) times a day Use in each nostril as directed   • Calcium 500-125 MG-UNIT TABS Take 1 tablet by mouth in the morning   • carBAMazepine (TEGretol) 100 mg chewable tablet Chew 1 tablet (100 mg total) 3 (three) times a day   • cholecalciferol (VITAMIN D3) 400 units tablet Take 400 Units by mouth daily   • Combivent Respimat inhaler Inhale 1 puff 4 (four) times a day   • diazepam (VALIUM) 5 mg tablet Take 1 tablet (5 mg total) by mouth 3 (three) times a day as needed for anxiety   • Magnesium 500 MG TABS Take 1 tablet by mouth daily   • omeprazole (PriLOSEC) 40 MG capsule Take 40 mg by mouth in the morning   • Stiolto Respimat 2 5-2 5 MCG/ACT inhaler inhale 1 puff by mouth daily   • venlafaxine (EFFEXOR-XR) 150 mg 24 hr capsule Take 1 capsule (150 mg total) by mouth daily   • [DISCONTINUED] HYDROcodone-acetaminophen (NORCO)  mg per tablet Take 1 tablet by mouth every 6 (six) hours as needed for moderate pain Max Daily Amount: 4 tablets   • [DISCONTINUED] nystatin (MYCOSTATIN) 500,000 units/5 mL suspension Apply 5 mL (500,000 Units total) to the mouth or throat 4 (four) times a day (Patient not taking: Reported on 2/10/2023)   • [DISCONTINUED] simethicone (Gas-X Ultra Strength) 180 MG capsule Take 1 capsule (180 mg total) by mouth every 6 (six) hours as needed for flatulence (Patient not taking: Reported on 2/10/2023)   • [DISCONTINUED] Specialty Vitamins Products (magnesium, amino acid chelate,) 133 MG tablet Take 1 tablet by mouth 2 (two) times a day (Patient not taking: Reported on 2/10/2023)       Objective     /81   Pulse 59   Temp 98 7 °F (37 1 °C) (Tympanic)   Resp 18   Wt 65 9 kg (145 lb 3 2 oz)   SpO2 99%   BMI 24 92 kg/m²     Physical Exam  Vitals and nursing note reviewed  Constitutional:       Appearance: Normal appearance  She is normal weight  Cardiovascular:      Rate and Rhythm: Normal rate and regular rhythm  Pulses: Normal pulses  Heart sounds: Normal heart sounds  Pulmonary:      Effort: Pulmonary effort is normal       Breath sounds: Normal breath sounds  Abdominal:      General: Abdomen is flat  Bowel sounds are normal       Palpations: Abdomen is soft  Musculoskeletal:         General: Normal range of motion  Cervical back: Normal range of motion and neck supple  Skin:     General: Skin is warm and dry  Capillary Refill: Capillary refill takes less than 2 seconds  Neurological:      General: No focal deficit present  Mental Status: She is alert and oriented to person, place, and time  Mental status is at baseline  Psychiatric:         Mood and Affect: Mood normal          Behavior: Behavior normal          Thought Content:  Thought content normal          Judgment: Judgment normal        Tony Kuar MD

## 2023-02-13 ENCOUNTER — TELEPHONE (OUTPATIENT)
Dept: FAMILY MEDICINE CLINIC | Facility: CLINIC | Age: 78
End: 2023-02-13

## 2023-02-14 LAB
6MAM UR QL CFM: NEGATIVE NG/ML
7AMINOCLONAZEPAM UR QL CFM: NEGATIVE NG/ML
A-OH ALPRAZ UR QL CFM: NEGATIVE NG/ML
ACCEPTABLE CREAT UR QL: NORMAL MG/DL
ACCEPTIBLE SP GR UR QL: NORMAL
AMPHET UR QL CFM: NEGATIVE NG/ML
BUPRENORPHINE UR QL CFM: NEGATIVE NG/ML
BUTALBITAL UR QL CFM: NEGATIVE NG/ML
BZE UR QL CFM: NEGATIVE NG/ML
CODEINE UR QL CFM: NEGATIVE NG/ML
EDDP UR QL CFM: NEGATIVE NG/ML
ETHYL GLUCURONIDE UR QL CFM: NEGATIVE NG/ML
ETHYL SULFATE UR QL SCN: NEGATIVE NG/ML
EUTYLONE UR QL: NEGATIVE NG/ML
FENTANYL UR QL CFM: NEGATIVE NG/ML
GLIADIN IGG SER IA-ACNC: NEGATIVE NG/ML
HYDROCODONE UR QL CFM: NORMAL NG/ML
HYDROMORPHONE UR QL CFM: NORMAL NG/ML
LORAZEPAM UR QL CFM: NEGATIVE NG/ML
ME-PHENIDATE UR QL CFM: NEGATIVE NG/ML
MEPERIDINE UR QL CFM: NEGATIVE NG/ML
METHADONE UR QL CFM: NEGATIVE NG/ML
METHAMPHET UR QL CFM: NEGATIVE NG/ML
MORPHINE UR QL CFM: NEGATIVE NG/ML
NALTREXONE UR QL CFM: NEGATIVE NG/ML
NITRITE UR QL: NORMAL UG/ML
NORBUPRENORPHINE UR QL CFM: NEGATIVE NG/ML
NORDIAZEPAM UR QL CFM: NORMAL NG/ML
NORFENTANYL UR QL CFM: NEGATIVE NG/ML
NORHYDROCODONE UR QL CFM: NORMAL NG/ML
NORMEPERIDINE UR QL CFM: NEGATIVE NG/ML
NOROXYCODONE UR QL CFM: NEGATIVE NG/ML
OXAZEPAM UR QL CFM: NORMAL NG/ML
OXYCODONE UR QL CFM: NEGATIVE NG/ML
OXYMORPHONE UR QL CFM: NEGATIVE NG/ML
OXYMORPHONE UR QL CFM: NEGATIVE NG/ML
PARA-FLUOROFENTANYL QUANTIFICATION: NORMAL NG/ML
PHENOBARB UR QL CFM: NEGATIVE NG/ML
RESULT ALL_PRESCRIBED MEDS AND SPECIAL INSTRUCTIONS: NORMAL
SECOBARBITAL UR QL CFM: NEGATIVE NG/ML
SL AMB 4-ANPP QUANTIFICATION: NORMAL NG/ML
SL AMB 5F-ADB-M7 METABOLITE QUANTIFICATION: NEGATIVE NG/ML
SL AMB 7-OH-MITRAGYNINE (KRATOM ALKALOID) QUANTIFICATION: NEGATIVE NG/ML
SL AMB AB-FUBINACA-M3 METABOLITE QUANTIFICATION: NEGATIVE NG/ML
SL AMB ACETYL FENTANYL QUANTIFICATION: NORMAL NG/ML
SL AMB ACETYL NORFENTANYL QUANTIFICATION: NORMAL NG/ML
SL AMB ACRYL FENTANYL QUANTIFICATION: NORMAL NG/ML
SL AMB CARFENTANIL QUANTIFICATION: NORMAL NG/ML
SL AMB CTHC (MARIJUANA METABOLITE) QUANTIFICATION: NEGATIVE NG/ML
SL AMB DEXTRORPHAN (DEXTROMETHORPHAN METABOLITE) QUANT: NEGATIVE NG/ML
SL AMB GABAPENTIN QUANTIFICATION: NEGATIVE
SL AMB JWH018 METABOLITE QUANTIFICATION: NEGATIVE NG/ML
SL AMB JWH073 METABOLITE QUANTIFICATION: NEGATIVE NG/ML
SL AMB MDMB-FUBINACA-M1 METABOLITE QUANTIFICATION: NEGATIVE NG/ML
SL AMB METHYLONE QUANTIFICATION: NEGATIVE NG/ML
SL AMB N-DESMETHYL-TRAMADOL QUANTIFICATION: NEGATIVE NG/ML
SL AMB PHENTERMINE QUANTIFICATION: NEGATIVE NG/ML
SL AMB PREGABALIN QUANTIFICATION: NEGATIVE
SL AMB RCS4 METABOLITE QUANTIFICATION: NEGATIVE NG/ML
SL AMB RITALINIC ACID QUANTIFICATION: NEGATIVE NG/ML
SMOOTH MUSCLE AB TITR SER IF: NEGATIVE NG/ML
SPECIMEN DRAWN SERPL: NEGATIVE NG/ML
SPECIMEN PH ACCEPTABLE UR: NORMAL
TAPENTADOL UR QL CFM: NEGATIVE NG/ML
TEMAZEPAM UR QL CFM: NORMAL NG/ML
TEMAZEPAM UR QL CFM: NORMAL NG/ML
TRAMADOL UR QL CFM: NEGATIVE NG/ML
URATE/CREAT 24H UR: NEGATIVE NG/ML

## 2023-02-21 DIAGNOSIS — Z12.31 ENCOUNTER FOR SCREENING MAMMOGRAM FOR BREAST CANCER: ICD-10-CM

## 2023-03-27 DIAGNOSIS — I71.40 ABDOMINAL AORTIC ANEURYSM (AAA) WITHOUT RUPTURE (HCC): ICD-10-CM

## 2023-03-27 DIAGNOSIS — G31.09 SEMANTIC DEMENTIA WITHOUT BEHAVIORAL DISTURBANCE (HCC): ICD-10-CM

## 2023-03-27 DIAGNOSIS — F33.41 RECURRENT MAJOR DEPRESSIVE DISORDER, IN PARTIAL REMISSION (HCC): ICD-10-CM

## 2023-03-27 DIAGNOSIS — M48.061 SPINAL STENOSIS OF LUMBAR REGION WITHOUT NEUROGENIC CLAUDICATION: ICD-10-CM

## 2023-03-27 DIAGNOSIS — D68.9 CLOTTING DISORDER (HCC): ICD-10-CM

## 2023-03-27 DIAGNOSIS — Z12.2 ENCOUNTER FOR SCREENING FOR LUNG CANCER: ICD-10-CM

## 2023-03-27 DIAGNOSIS — F11.20 OPIOID DEPENDENCE, UNCOMPLICATED (HCC): ICD-10-CM

## 2023-03-27 DIAGNOSIS — Z87.891 PERSONAL HISTORY OF NICOTINE DEPENDENCE: ICD-10-CM

## 2023-03-27 DIAGNOSIS — Z23 IMMUNIZATION DUE: ICD-10-CM

## 2023-03-27 DIAGNOSIS — M54.42 BILATERAL LOW BACK PAIN WITH BILATERAL SCIATICA, UNSPECIFIED CHRONICITY: ICD-10-CM

## 2023-03-27 DIAGNOSIS — F02.80 SEMANTIC DEMENTIA WITHOUT BEHAVIORAL DISTURBANCE (HCC): ICD-10-CM

## 2023-03-27 DIAGNOSIS — J43.2 CENTRILOBULAR EMPHYSEMA (HCC): ICD-10-CM

## 2023-03-27 DIAGNOSIS — M54.41 BILATERAL LOW BACK PAIN WITH BILATERAL SCIATICA, UNSPECIFIED CHRONICITY: ICD-10-CM

## 2023-03-27 DIAGNOSIS — M46.1 INFLAMMATION OF SACROILIAC JOINT (HCC): ICD-10-CM

## 2023-03-27 DIAGNOSIS — C53.9 MALIGNANT NEOPLASM OF CERVIX, UNSPECIFIED SITE (HCC): ICD-10-CM

## 2023-03-27 DIAGNOSIS — Z13.820 SCREENING FOR OSTEOPOROSIS: ICD-10-CM

## 2023-03-27 RX ORDER — ATORVASTATIN CALCIUM 40 MG/1
40 TABLET, FILM COATED ORAL DAILY
Qty: 90 TABLET | Refills: 3 | Status: SHIPPED | OUTPATIENT
Start: 2023-03-27

## 2023-03-27 RX ORDER — HYDROCODONE BITARTRATE AND ACETAMINOPHEN 10; 325 MG/1; MG/1
1 TABLET ORAL EVERY 6 HOURS PRN
Qty: 120 TABLET | Refills: 0 | Status: SHIPPED | OUTPATIENT
Start: 2023-03-27

## 2023-05-08 DIAGNOSIS — M46.1 INFLAMMATION OF SACROILIAC JOINT (HCC): ICD-10-CM

## 2023-05-08 DIAGNOSIS — M48.061 SPINAL STENOSIS OF LUMBAR REGION WITHOUT NEUROGENIC CLAUDICATION: ICD-10-CM

## 2023-05-10 RX ORDER — HYDROCODONE BITARTRATE AND ACETAMINOPHEN 10; 325 MG/1; MG/1
1 TABLET ORAL EVERY 6 HOURS PRN
Qty: 120 TABLET | Refills: 0 | Status: SHIPPED | OUTPATIENT
Start: 2023-05-10

## 2023-06-19 ENCOUNTER — RA CDI HCC (OUTPATIENT)
Dept: OTHER | Facility: HOSPITAL | Age: 78
End: 2023-06-19

## 2023-06-19 ENCOUNTER — OFFICE VISIT (OUTPATIENT)
Dept: FAMILY MEDICINE CLINIC | Facility: CLINIC | Age: 78
End: 2023-06-19
Payer: COMMERCIAL

## 2023-06-19 VITALS
OXYGEN SATURATION: 97 % | DIASTOLIC BLOOD PRESSURE: 84 MMHG | HEART RATE: 58 BPM | RESPIRATION RATE: 18 BRPM | WEIGHT: 133.8 LBS | SYSTOLIC BLOOD PRESSURE: 140 MMHG | TEMPERATURE: 97.7 F | BODY MASS INDEX: 22.97 KG/M2

## 2023-06-19 DIAGNOSIS — C53.9 MALIGNANT NEOPLASM OF CERVIX, UNSPECIFIED SITE (HCC): ICD-10-CM

## 2023-06-19 DIAGNOSIS — I71.43 INFRARENAL ABDOMINAL AORTIC ANEURYSM (AAA) WITHOUT RUPTURE (HCC): ICD-10-CM

## 2023-06-19 DIAGNOSIS — J43.2 CENTRILOBULAR EMPHYSEMA (HCC): ICD-10-CM

## 2023-06-19 DIAGNOSIS — F02.80 SEMANTIC DEMENTIA WITHOUT BEHAVIORAL DISTURBANCE (HCC): ICD-10-CM

## 2023-06-19 DIAGNOSIS — M46.1 INFLAMMATION OF SACROILIAC JOINT (HCC): ICD-10-CM

## 2023-06-19 DIAGNOSIS — E78.49 OTHER HYPERLIPIDEMIA: ICD-10-CM

## 2023-06-19 DIAGNOSIS — Q25.40 ABNORMALITY OF THORACIC AORTA: ICD-10-CM

## 2023-06-19 DIAGNOSIS — F33.1 MODERATE EPISODE OF RECURRENT MAJOR DEPRESSIVE DISORDER (HCC): ICD-10-CM

## 2023-06-19 DIAGNOSIS — F11.20 CONTINUOUS OPIOID DEPENDENCE (HCC): ICD-10-CM

## 2023-06-19 DIAGNOSIS — D68.9 CLOTTING DISORDER (HCC): ICD-10-CM

## 2023-06-19 DIAGNOSIS — Z00.00 MEDICARE ANNUAL WELLNESS VISIT, SUBSEQUENT: Primary | ICD-10-CM

## 2023-06-19 DIAGNOSIS — M48.061 SPINAL STENOSIS OF LUMBAR REGION WITHOUT NEUROGENIC CLAUDICATION: ICD-10-CM

## 2023-06-19 DIAGNOSIS — J44.1 COPD WITH ACUTE EXACERBATION (HCC): ICD-10-CM

## 2023-06-19 DIAGNOSIS — I10 ESSENTIAL HYPERTENSION: ICD-10-CM

## 2023-06-19 DIAGNOSIS — G31.09 SEMANTIC DEMENTIA WITHOUT BEHAVIORAL DISTURBANCE (HCC): ICD-10-CM

## 2023-06-19 PROCEDURE — G0439 PPPS, SUBSEQ VISIT: HCPCS | Performed by: FAMILY MEDICINE

## 2023-06-19 PROCEDURE — 99215 OFFICE O/P EST HI 40 MIN: CPT | Performed by: FAMILY MEDICINE

## 2023-06-19 RX ORDER — HYDROCODONE BITARTRATE AND ACETAMINOPHEN 10; 325 MG/1; MG/1
1 TABLET ORAL EVERY 6 HOURS PRN
Qty: 120 TABLET | Refills: 0 | Status: SHIPPED | OUTPATIENT
Start: 2023-06-19

## 2023-06-19 NOTE — PROGRESS NOTES
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)    Relevant Orders    TSH, 3rd generation    Comprehensive metabolic panel    CBC and differential       Musculoskeletal and Integument    Inflammation of sacroiliac joint (HCC)    Relevant Medications    HYDROcodone-acetaminophen (NORCO)  mg per tablet    Other Relevant Orders    TSH, 3rd generation    Comprehensive metabolic panel    CBC and differential       Hematopoietic and Hemostatic    Clotting disorder (HCC)    Relevant Orders    TSH, 3rd generation    Comprehensive metabolic panel    CBC and differential       Genitourinary    Cervical ca (HCC)    Relevant Orders    TSH, 3rd generation    Comprehensive metabolic panel    CBC and differential       Other    Other hyperlipidemia     Stable  Continue current  Urged smoking cessation          Other Visit Diagnoses     Infrarenal abdominal aortic aneurysm (AAA) without rupture ([address] )    -  Primary    Relevant Orders    CTA chest abdomen pelvis w wo contrast    Basic metabolic panel    TSH, 3rd generation    Comprehensive metabolic panel    CBC and differential    Abnormality of thoracic aorta        Relevant Orders    CTA chest abdomen pelvis w wo contrast    Basic metabolic panel    TSH, 3rd generation    Comprehensive metabolic panel    CBC and differential    Continuous opioid dependence ([address] )        Relevant Orders    TSH, 3rd generation    Comprehensive metabolic panel CBC and differential    Moderate episode of recurrent major depressive disorder (HCC)        Relevant Orders    TSH, 3rd generation    Comprehensive metabolic panel    CBC and differential    Spinal stenosis of lumbar region without neurogenic claudication        Relevant Medications    HYDROcodone-acetaminophen (NORCO)  mg per tablet           Preventive health issues were discussed with patient, and age appropriate screening tests were ordered as noted in patient's After Visit Summary  Personalized health advice and appropriate referrals for health education or preventive services given if needed, as noted in patient's After Visit Summary  History of Present Illness:     Patient presents for a Medicare Wellness Visit    She is a long-time smoker  She is due for lung cancer screening, but she also has a small distal thoracic aortic aneurysm and a small infrarenal AAA  She has no back pain or abdominal pain  Other than smoking, her other risk factors are controlled  She has a greater than 10 pound unintended weight loss  She has no F/C  She has no cough  She has no tremor  She denies consitutional symptoms  Her COPD is quiet  Patient Care Team:  Michaela Shah MD as PCP - General  Michaela Shah MD as PCP - [address] [place] [address] (RTE)  Michaela Shah MD as PCP - [facility]CP-[facility] (RTE)     Review of Systems:     Review of Systems   All other systems reviewed and are negative         Problem List:     Patient Active Problem List   Diagnosis   • Essential hypertension   • Other hyperlipidemia   • Screening for genitourinary condition   • Screening for neurological condition   • Hyperlipidemia   • Hypertension   • Asymptomatic microscopic hematuria   • Rash of face   • Pulmonary nodules   • Lumbar disc disease   • Cytologic evidence of malignancy on smear of cervix   • COPD with acute exacerbation ([address] )   • HTN, goal below 140/90   • Inflammation of sacroiliac joint ([address] )   • Thyroid nodule • Chronic pain of right knee   • Chronic groin pain, right   • BMI 26 0-26 9,adult   • Smoker   • Medicare annual wellness visit, subsequent   • Clotting disorder ([facility] )   • 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 ([facility] )   • Opioid dependence, uncomplicated (HCC)   • Recurrent major depressive disorder, in partial remission ([facility] )   • Atherosclerosis of abdominal aorta (HCC)   • High risk medication use   • Centrilobular emphysema (HCC)   • Cervical ca ([facility] )   • Refused influenza vaccine      Past Medical and Surgical History:     Past Medical History:   Diagnosis Date   • Elevated liver enzymes     last assessed: 12/27/2016   • Fracture of rib     last assessed: 08/31/2015   • HPV (human papilloma virus) infection    • Osteoporosis    • Pulmonary nodule    • Thyroid nodule      Past Surgical History:   Procedure Laterality Date   • APPENDECTOMY      last assessed: 08/31/2015   • BACK SURGERY      last assessed: 08/31/2015   • CATARACT EXTRACTION     • CHOLECYSTECTOMY      last assessed: 08/31/2015   • COLONOSCOPY W/ BIOPSIES     • COLPOSCOPY     • FOOT SURGERY      last assessed: 08/31/2015   • TOTAL ABDOMINAL HYSTERECTOMY      last assessed: 08/31/2015; unilateral oopherectomy   • TOTAL HIP ARTHROPLASTY Right     last assessed: 08/31/2015; [address]   • US GUIDED THYROID BIOPSY        Family History:     Family History   Problem Relation Age of Onset   • Heart disease Mother         cardiac disorder   • Diabetes type II Mother    • Heart disease Father         cardiac disorder       Social History:     Social History     Socioeconomic History   • Marital status:       Spouse name: None   • Number of children: None   • Years of education: None   • Highest education level: None   Occupational History   • None   Tobacco Use   • Smoking status: Every Day     Packs/day: 0 50     Years: 35 00     Total pack years: 17 50     Types: Cigarettes   • Smokeless tobacco: Never   Vaping Use   • Vaping Use: Never used Substance and Sexual Activity   • Alcohol use: No   • Drug use: No   • Sexual activity: None   Other Topics Concern   • None   Social History Narrative   • None     Social Determinants of Health     Financial Resource Strain: Low Risk  (6/19/2023)    Overall Financial Resource Strain (CARDIA)    • Difficulty of Paying Living Expenses: Not hard at all   Food Insecurity: Not on file   Transportation Needs: No Transportation Needs (6/19/2023)    PRAPARE - Transportation    • Lack of Transportation (Medical): No    • Lack of Transportation (Non-Medical):  No   Physical Activity: Unknown (2/18/2019)    Exercise Vital Sign    • Days of Exercise per Week: 3 days    • Minutes of Exercise per Session: Not on file   Stress: Not on file   Social Connections: Not on file   Intimate Partner Violence: Not on file   Housing Stability: Not on file      Medications and Allergies:     Current Outpatient Medications   Medication Sig Dispense Refill   • Ascorbic Acid (vitamin C) 100 MG tablet Take 100 mg by mouth daily     • atenolol (TENORMIN) 25 mg tablet take 2 tablet by mouth once daily 180 tablet 3   • atorvastatin (LIPITOR) 40 mg tablet Take 1 tablet (40 mg total) by mouth daily 90 tablet 3   • azelastine (ASTELIN) 0 1 % nasal spray 1 spray into each nostril 2 (two) times a day Use in each nostril as directed 30 mL 5   • Calcium 500-125 MG-UNIT TABS Take 1 tablet by mouth in the morning     • carBAMazepine (TEGretol) 100 mg chewable tablet Chew 1 tablet (100 mg total) 3 (three) times a day 30 tablet 0   • cholecalciferol (VITAMIN D3) 400 units tablet Take 400 Units by mouth daily     • Combivent Respimat inhaler Inhale 1 puff 4 (four) times a day 4 g 5   • diazepam (VALIUM) 5 mg tablet Take 1 tablet (5 mg total) by mouth 3 (three) times a day as needed for anxiety 90 tablet 5   • HYDROcodone-acetaminophen (NORCO)  mg per tablet Take 1 tablet by mouth every 6 (six) hours as needed for moderate pain Max Daily Amount: 4 tablets 120 tablet 0   • Magnesium 500 MG TABS Take 1 tablet by mouth daily     • omeprazole (PriLOSEC) 40 MG capsule Take 40 mg by mouth in the morning     • Stiolto Respimat 2 5-2 5 MCG/ACT inhaler inhale 1 puff by mouth daily 12 g 5   • venlafaxine (EFFEXOR-XR) 150 mg 24 hr capsule Take 1 capsule (150 mg total) by mouth daily 90 capsule 3     No current facility-administered medications for this visit  No Known Allergies   Immunizations:     Immunization History   Administered Date(s) Administered   • H1N1, All Formulations 03/19/2010   • INFLUENZA 01/14/2013, 11/03/2015, 12/15/2017, 11/14/2018   • Influenza Quadrivalent Preservative Free 3 years and older IM 11/03/2015   • Influenza, high dose seasonal 0 7 mL 11/14/2018, 11/19/2019   • Influenza, seasonal, injectable 12/15/2017   • Pneumococcal Conjugate 13-Valent 12/21/2017   • Pneumococcal Polysaccharide PPV23 07/23/2013, 11/03/2015   • Tdap 07/23/2013   • Tuberculin Skin Test-PPD Intradermal 09/17/2013   • Zoster 11/03/2015      Health Maintenance:         Topic Date Due   • Breast Cancer Screening: Mammogram  02/17/2024   • Colorectal Cancer Screening  07/29/2025   • Hepatitis C Screening  Completed   • Lung Cancer Screening  Discontinued         Topic Date Due   • COVID-19 Vaccine (1) Never done   • Influenza Vaccine (Season Ended) 09/01/2023      Medicare Screening Tests and Risk Assessments:     Ciera Wing is here for her Subsequent Wellness visit  Last Medicare Wellness visit information reviewed, patient interviewed and updates made to the record today  Health Risk Assessment:   Patient rates overall health as good  Patient feels that their physical health rating is slightly better  Patient is satisfied with their life  Eyesight was rated as slightly worse  Hearing was rated as same  Patient feels that their emotional and mental health rating is slightly better  Patients states they are never, rarely angry   Patient states they are sometimes unusually tired/fatigued  Pain experienced in the last 7 days has been some  Patient's pain rating has been 7/10  Patient states that she has experienced weight loss or gain in last 6 months  Depression Screening:   PHQ-9 Score: 9      Fall Risk Screening: In the past year, patient has experienced: no history of falling in past year      Urinary Incontinence Screening:   Patient has not leaked urine accidently in the last six months  Home Safety:  Patient does not have trouble with stairs inside or outside of their home  Patient has working smoke alarms and has working carbon monoxide detector  Home safety hazards include: none  Nutrition:   Current diet is Regular  Medications:   Patient is currently taking over-the-counter supplements  OTC medications include: see medication list  Patient is able to manage medications  Activities of Daily Living (ADLs)/Instrumental Activities of Daily Living (IADLs):   Walk and transfer into and out of bed and chair?: Yes  Dress and groom yourself?: Yes    Bathe or shower yourself?: Yes    Feed yourself?  Yes  Do your laundry/housekeeping?: Yes  Manage your money, pay your bills and track your expenses?: Yes  Make your own meals?: Yes    Do your own shopping?: Yes    Previous Hospitalizations:   Any hospitalizations or ED visits within the last 12 months?: No      Advance Care Planning:   Living will: Yes    Advanced directive: Yes      Cognitive Screening:   Provider or family/friend/caregiver concerned regarding cognition?: No    PREVENTIVE SCREENINGS      Cardiovascular Screening:    General: Screening Not Indicated and History Lipid Disorder      Colorectal Cancer Screening:     General: Screening Current      Breast Cancer Screening:     General: Screening Current      Cervical Cancer Screening:    General: Screening Not Indicated and History Cervical Cancer      Osteoporosis Screening:    General: Screening Not Indicated      Abdominal Aortic Aneurysm (AAA) Screening:        General: Screening Not Indicated and History AAA      Lung Cancer Screening:     General: Screening Not Indicated      Hepatitis C Screening:    General: Screening Current    Screening, Brief Intervention, and Referral to Treatment (SBIRT)    Screening  Typical number of drinks in a day: 0  Typical number of drinks in a week: 0  Interpretation: Low risk drinking behavior  AUDIT-C Screenin) How often did you have a drink containing alcohol in the past year? never  2) How many drinks did you have on a typical day when you were drinking in the past year? 0  3) How often did you have 6 or more drinks on one occasion in the past year? never    AUDIT-C Score: 0  Interpretation: Score 0-2 (female): Negative screen for alcohol misuse    Single Item Drug Screening:  How often have you used an illegal drug (including marijuana) or a prescription medication for non-medical reasons in the past year? never    Single Item Drug Screen Score: 0  Interpretation: Negative screen for possible drug use disorder    Review of Current Opioid Use    Opioid Risk Tool (ORT) Interpretation: Complete Opioid Risk Tool (ORT)    No results found  Physical Exam:     /84   Pulse 58   Temp 97 7 °F (36 5 °C) (Temporal)   Resp 18   Wt 60 7 kg (133 lb 12 8 oz)   SpO2 97%   BMI 22 97 kg/m²     Physical Exam  Vitals and nursing note reviewed  Constitutional:       Appearance: Normal appearance  She is normal weight  Cardiovascular:      Rate and Rhythm: Normal rate and regular rhythm  Pulses: Normal pulses  Heart sounds: Normal heart sounds  Pulmonary:      Effort: Pulmonary effort is normal       Breath sounds: Normal breath sounds  Abdominal:      General: Abdomen is flat  Bowel sounds are normal       Palpations: Abdomen is soft  Musculoskeletal:         General: Normal range of motion  Cervical back: Normal range of motion and neck supple     Skin:     General: Skin is warm and dry       Capillary Refill: Capillary refill takes less than 2 seconds  Neurological:      General: No focal deficit present  Mental Status: She is alert and oriented to person, place, and time  Mental status is at baseline  Psychiatric:         Mood and Affect: Mood normal          Behavior: Behavior normal          Thought Content:  Thought content normal          Judgment: Judgment normal           Ortiz Metcalf MD

## 2023-06-19 NOTE — PROGRESS NOTES
[facility]  coding opportunities       Chart reviewed, no opportunity found: CHART REVIEWED, Cholo Taylor [id number]     Patients Insurance     Medicare Insurance: [employer]

## 2023-07-20 ENCOUNTER — TELEPHONE (OUTPATIENT)
Dept: FAMILY MEDICINE CLINIC | Facility: CLINIC | Age: 78
End: 2023-07-20

## 2023-07-20 DIAGNOSIS — F17.200 TOBACCO DEPENDENCY: ICD-10-CM

## 2023-07-20 RX ORDER — IPRATROPIUM BROMIDE AND ALBUTEROL 20; 100 UG/1; UG/1
1 SPRAY, METERED RESPIRATORY (INHALATION) 4 TIMES DAILY
Qty: 4 G | Refills: 5 | Status: SHIPPED | OUTPATIENT
Start: 2023-07-20

## 2023-07-26 DIAGNOSIS — F41.9 ANXIETY: ICD-10-CM

## 2023-07-27 RX ORDER — DIAZEPAM 5 MG/1
5 TABLET ORAL 3 TIMES DAILY PRN
Qty: 90 TABLET | Refills: 5 | Status: SHIPPED | OUTPATIENT
Start: 2023-07-27

## 2023-08-07 DIAGNOSIS — M46.1 INFLAMMATION OF SACROILIAC JOINT (HCC): ICD-10-CM

## 2023-08-07 DIAGNOSIS — M48.061 SPINAL STENOSIS OF LUMBAR REGION WITHOUT NEUROGENIC CLAUDICATION: ICD-10-CM

## 2023-08-08 RX ORDER — HYDROCODONE BITARTRATE AND ACETAMINOPHEN 10; 325 MG/1; MG/1
1 TABLET ORAL EVERY 6 HOURS PRN
Qty: 120 TABLET | Refills: 0 | Status: SHIPPED | OUTPATIENT
Start: 2023-08-08

## 2023-09-09 DIAGNOSIS — I10 ESSENTIAL HYPERTENSION: ICD-10-CM

## 2023-09-11 RX ORDER — ATENOLOL 25 MG/1
TABLET ORAL
Qty: 180 TABLET | Refills: 3 | Status: SHIPPED | OUTPATIENT
Start: 2023-09-11

## 2023-09-14 ENCOUNTER — RA CDI HCC (OUTPATIENT)
Dept: OTHER | Facility: HOSPITAL | Age: 78
End: 2023-09-14

## 2023-09-14 DIAGNOSIS — M48.061 SPINAL STENOSIS OF LUMBAR REGION WITHOUT NEUROGENIC CLAUDICATION: ICD-10-CM

## 2023-09-14 DIAGNOSIS — M46.1 INFLAMMATION OF SACROILIAC JOINT (HCC): ICD-10-CM

## 2023-09-14 NOTE — PROGRESS NOTES
[address] coding opportunities       Chart reviewed, no opportunity found: [address] Review     Patients Insurance     Medicare Insurance: [employer]

## 2023-09-15 ENCOUNTER — RA CDI HCC (OUTPATIENT)
Dept: OTHER | Facility: HOSPITAL | Age: 78
End: 2023-09-15

## 2023-09-15 RX ORDER — HYDROCODONE BITARTRATE AND ACETAMINOPHEN 10; 325 MG/1; MG/1
1 TABLET ORAL EVERY 6 HOURS PRN
Qty: 120 TABLET | Refills: 0 | Status: SHIPPED | OUTPATIENT
Start: 2023-09-15

## 2023-09-16 NOTE — PROGRESS NOTES
[address] [facility] coding opportunities       Chart reviewed, no opportunity found: CHART REVIEWED, [facility]     Patients Insurance     Medicare Insurance: [employer]

## 2023-09-20 ENCOUNTER — TELEPHONE (OUTPATIENT)
Dept: ADMINISTRATIVE | Facility: OTHER | Age: 78
End: 2023-09-20

## 2023-09-20 NOTE — TELEPHONE ENCOUNTER
09/20/23 12:08 PM    Patient contacted (spoke with patient) to bring Advance Directive, POLST, or Living Will document to next scheduled pcp visit. Thank you.   Yazmin Haro  [facility]

## 2023-09-22 ENCOUNTER — OFFICE VISIT (OUTPATIENT)
Dept: FAMILY MEDICINE CLINIC | Facility: CLINIC | Age: 78
End: 2023-09-22
Payer: COMMERCIAL

## 2023-09-22 VITALS
BODY MASS INDEX: 22.19 KG/M2 | RESPIRATION RATE: 18 BRPM | OXYGEN SATURATION: 96 % | TEMPERATURE: 99.1 F | WEIGHT: 129.3 LBS | HEART RATE: 58 BPM | SYSTOLIC BLOOD PRESSURE: 130 MMHG | DIASTOLIC BLOOD PRESSURE: 78 MMHG

## 2023-09-22 DIAGNOSIS — E04.1 THYROID NODULE: ICD-10-CM

## 2023-09-22 DIAGNOSIS — E78.49 OTHER HYPERLIPIDEMIA: ICD-10-CM

## 2023-09-22 DIAGNOSIS — I10 HTN, GOAL BELOW 140/90: ICD-10-CM

## 2023-09-22 DIAGNOSIS — F11.20 CONTINUOUS OPIOID DEPENDENCE (HCC): ICD-10-CM

## 2023-09-22 DIAGNOSIS — R63.4 WEIGHT LOSS: Primary | ICD-10-CM

## 2023-09-22 DIAGNOSIS — J43.2 CENTRILOBULAR EMPHYSEMA (HCC): ICD-10-CM

## 2023-09-22 DIAGNOSIS — J44.1 COPD WITH ACUTE EXACERBATION (HCC): ICD-10-CM

## 2023-09-22 DIAGNOSIS — F33.41 RECURRENT MAJOR DEPRESSIVE DISORDER, IN PARTIAL REMISSION (HCC): ICD-10-CM

## 2023-09-22 DIAGNOSIS — R07.89 ATYPICAL CHEST PAIN: ICD-10-CM

## 2023-09-22 PROCEDURE — 99215 OFFICE O/P EST HI 40 MIN: CPT | Performed by: FAMILY MEDICINE

## 2023-09-22 NOTE — PROGRESS NOTES
Name: Marcela Gonzales      :       MRN: [id number]  Encounter Provider: Roro Walls MD  Encounter Date: 2023   Encounter department: [address] [place] [address]     1. Weight loss  -     TSH, 3rd generation; Future  -     T3, free; Future  -     T4, free; Future    2. Atypical chest pain  -     NM myocardial perfusion spect (rx stress and/or rest); Future; Expected date: 2023    3. Thyroid nodule  -     US thyroid; Future; Expected date: 2023    4. Continuous opioid dependence (HCC)  -     [facility] All Prescribed Meds and Special Instructions  -     Amphetamines, Methamphetamines  -     Butalbital  -     Phenobarbital  -     Secobarbital  -     Alprazolam  -     Clonazepam  -     Diazepam, Temazepam, Oxazepam  -     Lorazepam  -     Gabapentin  -     Pregabalin  -     Cocaine  -     Heroin  -     Buprenorphine  -     Levorphanol  -     Meperidine  -     Naltrexone  -     Fentanyl  -     Methadone  -     Oxycodone, Oxymorphone  -     Tapentadol  -     THC  -     Tramadol  -     Codeine, Hydrocodone, Hydropmorphone, Morphine  -     Bath Salts  -     Ethyl Glucuronide/Ethyl Sulfate  -     Kratom  -     Spice  -     Methylphenidate  -     Phentermine  -     Validity Oxidant  -     Validity Creatinine  -     Validity pH  -     Validity Specific    5. Other hyperlipidemia    6. HTN, goal below 140/90    7. Recurrent major depressive disorder, in partial remission ([address])    8. Centrilobular emphysema (HCC)  -     Complete PFT with post bronchodilator; Future    9. COPD with acute exacerbation ([address])  -     Complete PFT with post bronchodilator; Future           Subjective      She is losing weight. She has normal thyroid testing, She had a normal CT chest/abdomen/pelvis in  of this year. She had a normal colonoscopy in . She had a normal mammogram in . Her pain is well controlled on her current regimen.   She sees pain management and is doing PT. Her COPD is quiet. She has no cough, wheeze, or sputum production. Review of Systems   All other systems reviewed and are negative. Current Outpatient Medications on File Prior to Visit   Medication Sig   • Ascorbic Acid (vitamin C) 100 MG tablet Take 100 mg by mouth daily   • atenolol (TENORMIN) 25 mg tablet take 2 tablet by mouth once daily   • atorvastatin (LIPITOR) 40 mg tablet Take 1 tablet (40 mg total) by mouth daily   • azelastine (ASTELIN) 0.1 % nasal spray 1 spray into each nostril 2 (two) times a day Use in each nostril as directed   • Calcium 500-125 MG-UNIT TABS Take 1 tablet by mouth in the morning   • carBAMazepine (TEGretol) 100 mg chewable tablet Chew 1 tablet (100 mg total) 3 (three) times a day   • cholecalciferol (VITAMIN D3) 400 units tablet Take 400 Units by mouth daily   • Combivent Respimat inhaler Inhale 1 puff 4 (four) times a day   • diazepam (VALIUM) 5 mg tablet take 1 tablet by mouth three times a day if needed for anxiety   • HYDROcodone-acetaminophen (NORCO)  mg per tablet Take 1 tablet by mouth every 6 (six) hours as needed for moderate pain Max Daily Amount: 4 tablets   • Magnesium 500 MG TABS Take 1 tablet by mouth daily   • omeprazole (PriLOSEC) 40 MG capsule Take 40 mg by mouth in the morning   • Stiolto Respimat 2.5-2.5 MCG/ACT inhaler inhale 1 puff by mouth daily   • venlafaxine (EFFEXOR-XR) 150 mg 24 hr capsule Take 1 capsule (150 mg total) by mouth daily       Objective     /78   Pulse 58   Temp 99.1 °F (37.3 °C) (Temporal)   Resp 18   Wt 58.7 kg (129 lb 4.8 oz)   SpO2 96%   BMI 22.19 kg/m²     Physical Exam  Vitals and nursing note reviewed. Constitutional:       Appearance: Normal appearance. She is normal weight. Cardiovascular:      Rate and Rhythm: Normal rate and regular rhythm. Pulses: Normal pulses. Heart sounds: Normal heart sounds.    Pulmonary:      Effort: Pulmonary effort is normal.      Breath sounds: Normal breath sounds. Abdominal:      General: Abdomen is flat. Bowel sounds are normal.      Palpations: Abdomen is soft. Musculoskeletal:         General: Normal range of motion. Cervical back: Normal range of motion and neck supple. Skin:     General: Skin is warm and dry. Capillary Refill: Capillary refill takes less than 2 seconds. Neurological:      General: No focal deficit present. Mental Status: She is alert and oriented to person, place, and time. Mental status is at baseline. Psychiatric:         Mood and Affect: Mood normal.         Behavior: Behavior normal.         Thought Content:  Thought content normal.         Judgment: Judgment normal.       Chuy Marin MD

## 2023-09-25 ENCOUNTER — TELEPHONE (OUTPATIENT)
Dept: FAMILY MEDICINE CLINIC | Facility: CLINIC | Age: 78
End: 2023-09-25

## 2023-09-25 DIAGNOSIS — R52 PAIN: Primary | ICD-10-CM

## 2023-09-25 NOTE — TELEPHONE ENCOUNTER
Hi, my name is Stella Lundborg. My birthday is [id number]. I'm calling concerning. I have fallen and landed on my tailbone and it's hurting very badly. I was hoping Doctor Sienna Lujan could possibly set me up to have an X-ray or something to find out what may have happened. If I did any damage I'd appreciate if you give me a call back. My phone number is [phone]. Thank you.

## 2023-10-24 DIAGNOSIS — M46.1 INFLAMMATION OF SACROILIAC JOINT (HCC): ICD-10-CM

## 2023-10-24 DIAGNOSIS — M48.061 SPINAL STENOSIS OF LUMBAR REGION WITHOUT NEUROGENIC CLAUDICATION: ICD-10-CM

## 2023-10-24 RX ORDER — HYDROCODONE BITARTRATE AND ACETAMINOPHEN 10; 325 MG/1; MG/1
TABLET ORAL
Qty: 120 TABLET | Refills: 0 | Status: SHIPPED | OUTPATIENT
Start: 2023-10-24

## 2023-11-30 ENCOUNTER — TELEPHONE (OUTPATIENT)
Dept: ADMINISTRATIVE | Facility: OTHER | Age: 78
End: 2023-11-30

## 2023-11-30 NOTE — TELEPHONE ENCOUNTER
11/30/23 10:48 AM    Patient contacted (spoke with patient) to bring Advance Directive, POLST, or Living Will document to next scheduled pcp visit. Thank you.   Lizz Duong  [facility]

## 2023-12-01 ENCOUNTER — OFFICE VISIT (OUTPATIENT)
Dept: FAMILY MEDICINE CLINIC | Facility: CLINIC | Age: 78
End: 2023-12-01
Payer: COMMERCIAL

## 2023-12-01 VITALS
TEMPERATURE: 99.2 F | OXYGEN SATURATION: 96 % | HEART RATE: 62 BPM | BODY MASS INDEX: 22.2 KG/M2 | DIASTOLIC BLOOD PRESSURE: 80 MMHG | HEIGHT: 64 IN | SYSTOLIC BLOOD PRESSURE: 150 MMHG | WEIGHT: 130 LBS

## 2023-12-01 DIAGNOSIS — G31.09 SEMANTIC DEMENTIA WITHOUT BEHAVIORAL DISTURBANCE (HCC): ICD-10-CM

## 2023-12-01 DIAGNOSIS — M46.1 INFLAMMATION OF SACROILIAC JOINT (HCC): ICD-10-CM

## 2023-12-01 DIAGNOSIS — F02.80 SEMANTIC DEMENTIA WITHOUT BEHAVIORAL DISTURBANCE (HCC): ICD-10-CM

## 2023-12-01 DIAGNOSIS — M48.061 SPINAL STENOSIS OF LUMBAR REGION WITHOUT NEUROGENIC CLAUDICATION: ICD-10-CM

## 2023-12-01 DIAGNOSIS — J44.1 COPD EXACERBATION (HCC): Primary | ICD-10-CM

## 2023-12-01 DIAGNOSIS — D68.9 CLOTTING DISORDER (HCC): ICD-10-CM

## 2023-12-01 DIAGNOSIS — F11.20 CONTINUOUS OPIOID DEPENDENCE (HCC): ICD-10-CM

## 2023-12-01 DIAGNOSIS — F33.41 RECURRENT MAJOR DEPRESSIVE DISORDER, IN PARTIAL REMISSION (HCC): ICD-10-CM

## 2023-12-01 PROCEDURE — 99214 OFFICE O/P EST MOD 30 MIN: CPT | Performed by: FAMILY MEDICINE

## 2023-12-01 RX ORDER — IPRATROPIUM BROMIDE 21 UG/1
2 SPRAY, METERED NASAL EVERY 12 HOURS
Qty: 30 ML | Refills: 5 | Status: SHIPPED | OUTPATIENT
Start: 2023-12-01

## 2023-12-01 RX ORDER — HYDROCODONE BITARTRATE AND ACETAMINOPHEN 10; 325 MG/1; MG/1
1 TABLET ORAL EVERY 6 HOURS PRN
Qty: 120 TABLET | Refills: 0 | Status: SHIPPED | OUTPATIENT
Start: 2023-12-01

## 2023-12-01 RX ORDER — METHYLPREDNISOLONE 4 MG/1
TABLET ORAL
Qty: 21 EACH | Refills: 0 | Status: SHIPPED | OUTPATIENT
Start: 2023-12-01

## 2023-12-01 RX ORDER — DOXYCYCLINE 100 MG/1
100 CAPSULE ORAL 2 TIMES DAILY
Qty: 14 CAPSULE | Refills: 0 | Status: SHIPPED | OUTPATIENT
Start: 2023-12-01 | End: 2023-12-08

## 2023-12-01 RX ORDER — ALBUTEROL SULFATE 2.5 MG/3ML
2.5 SOLUTION RESPIRATORY (INHALATION) EVERY 6 HOURS PRN
Qty: 300 ML | Refills: 5 | Status: SHIPPED | OUTPATIENT
Start: 2023-12-01

## 2023-12-01 NOTE — PROGRESS NOTES
Assessment/Plan:    No problem-specific Assessment & Plan notes found for this encounter. Diagnoses and all orders for this visit:    COPD exacerbation ([address])  -     methylPREDNISolone 4 MG tablet therapy pack; Use as directed on package  -     doxycycline monohydrate (MONODOX) 100 mg capsule; Take 1 capsule (100 mg total) by mouth 2 (two) times a day for 7 days  -     albuterol (2.5 mg/3 mL) 0.083 % nebulizer solution; Take 3 mL (2.5 mg total) by nebulization every 6 (six) hours as needed for wheezing or shortness of breath  -     ipratropium (ATROVENT) 0.03 % nasal spray; 2 sprays into each nostril every 12 (twelve) hours    Inflammation of sacroiliac joint (HCC)  -     HYDROcodone-acetaminophen (NORCO)  mg per tablet; Take 1 tablet by mouth every 6 (six) hours as needed for moderate pain Max Daily Amount: 4 tablets    Spinal stenosis of lumbar region without neurogenic claudication  -     HYDROcodone-acetaminophen (NORCO)  mg per tablet; Take 1 tablet by mouth every 6 (six) hours as needed for moderate pain Max Daily Amount: 4 tablets    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 female. She has worsening cough, congestion, and ALEXANDRE for 2 weeks. She is getting worse. She has multiple ill contacts. She has no F/C. She is using her inhalers without much relief. She continues to smoke. The following portions of the patient's history were reviewed and updated as appropriate: She  has a past medical history of Elevated liver enzymes, Fracture of rib, HPV (human papilloma virus) infection, Osteoporosis, Pulmonary nodule, and Thyroid nodule.   She   Patient Active Problem List    Diagnosis Date Noted    Weight loss 09/22/2023    Atypical chest pain 09/22/2023    Cervical ca ([address]) 11/29/2021    Refused influenza vaccine 11/29/2021    Centrilobular emphysema ([address]) 09/28/2021    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 ([address]) 11/27/2020    Continuous opioid dependence ([address]) 11/27/2020    Recurrent major depressive disorder, in partial remission ([address]) 11/27/2020    Atherosclerosis of abdominal aorta ([address]) 11/27/2020    Clotting disorder ([address]) 02/21/2020    BMI 26.0-26.9,adult 05/10/2019    Smoker 05/10/2019    Medicare annual wellness visit, subsequent 05/10/2019    Chronic groin pain, right 12/19/2018    Thyroid nodule 10/16/2018    Chronic pain of right knee 10/16/2018    Pulmonary nodules 09/11/2018    Cytologic evidence of malignancy on smear of cervix 09/11/2018    Asymptomatic microscopic hematuria 06/11/2018    Rash of face 06/11/2018    Essential hypertension 03/22/2018    Other hyperlipidemia 03/22/2018    Screening for genitourinary condition 03/22/2018    Screening for neurological condition 03/22/2018    COPD with acute exacerbation ([address]) 12/13/2017    Hyperlipidemia 08/31/2015    Hypertension 08/31/2015    Inflammation of sacroiliac joint ([address]) 08/29/2015    Lumbar disc disease 08/18/2014    High risk medication use 11/13/2013    HTN, goal below 140/90 08/24/2011     She  has a past surgical history that includes Appendectomy; Back surgery; Cholecystectomy; Foot surgery; Total hip arthroplasty (Right); Total abdominal hysterectomy; Colposcopy; Colonoscopy w/ biopsies; US guided thyroid biopsy; and Cataract extraction. Her family history includes Diabetes type II in her mother; Heart disease in her father and mother. She  reports that she has been smoking cigarettes. She has a 17.50 pack-year smoking history. She has never used smokeless tobacco. She reports that she does not drink alcohol and does not use drugs.   Current Outpatient Medications   Medication Sig Dispense Refill    albuterol (2.5 mg/3 mL) 0.083 % nebulizer solution Take 3 mL (2.5 mg total) by nebulization every 6 (six) hours as needed for wheezing or shortness of breath 300 mL 5    atenolol (TENORMIN) 25 mg tablet take 2 tablet by mouth once daily 180 tablet 3    atorvastatin (LIPITOR) 40 mg tablet Take 1 tablet (40 mg total) by mouth daily 90 tablet 3    azelastine (ASTELIN) 0.1 % nasal spray 1 spray into each nostril 2 (two) times a day Use in each nostril as directed 30 mL 5    Combivent Respimat inhaler Inhale 1 puff 4 (four) times a day 4 g 5    diazepam (VALIUM) 5 mg tablet take 1 tablet by mouth three times a day if needed for anxiety 90 tablet 5    doxycycline monohydrate (MONODOX) 100 mg capsule Take 1 capsule (100 mg total) by mouth 2 (two) times a day for 7 days 14 capsule 0    HYDROcodone-acetaminophen (NORCO)  mg per tablet Take 1 tablet by mouth every 6 (six) hours as needed for moderate pain Max Daily Amount: 4 tablets 120 tablet 0    ipratropium (ATROVENT) 0.03 % nasal spray 2 sprays into each nostril every 12 (twelve) hours 30 mL 5    Magnesium 500 MG TABS Take 1 tablet by mouth daily      methylPREDNISolone 4 MG tablet therapy pack Use as directed on package 21 each 0    Stiolto Respimat 2.5-2.5 MCG/ACT inhaler inhale 1 puff by mouth daily 12 g 5    venlafaxine (EFFEXOR-XR) 150 mg 24 hr capsule Take 1 capsule (150 mg total) by mouth daily 90 capsule 3     No current facility-administered medications for this visit.      Current Outpatient Medications on File Prior to Visit   Medication Sig    atenolol (TENORMIN) 25 mg tablet take 2 tablet by mouth once daily    atorvastatin (LIPITOR) 40 mg tablet Take 1 tablet (40 mg total) by mouth daily    azelastine (ASTELIN) 0.1 % nasal spray 1 spray into each nostril 2 (two) times a day Use in each nostril as directed    Combivent Respimat inhaler Inhale 1 puff 4 (four) times a day    diazepam (VALIUM) 5 mg tablet take 1 tablet by mouth three times a day if needed for anxiety    Magnesium 500 MG TABS Take 1 tablet by mouth daily    Stiolto Respimat 2.5-2.5 MCG/ACT inhaler inhale 1 puff by mouth daily    venlafaxine (EFFEXOR-XR) 150 mg 24 hr capsule Take 1 capsule (150 mg total) by mouth daily     No current facility-administered medications on file prior to visit. She has No Known Allergies. .    Review of Systems   Constitutional:  Positive for activity change and fatigue. Respiratory:  Positive for cough, shortness of breath and wheezing. All other systems reviewed and are negative. Objective:      /80 (BP Location: Left arm, Patient Position: Sitting)   Pulse 62   Temp 99.2 °F (37.3 °C)   Ht 5' 4" (1.626 m)   Wt 59 kg (130 lb)   SpO2 96%   BMI 22.31 kg/m²          Physical Exam  Vitals and nursing note reviewed. Constitutional:       Appearance: Normal appearance. She is normal weight. Cardiovascular:      Rate and Rhythm: Normal rate and regular rhythm. Pulses: Normal pulses. Heart sounds: Normal heart sounds. Pulmonary:      Effort: Pulmonary effort is normal.      Breath sounds: Normal breath sounds. Abdominal:      General: Abdomen is flat. Bowel sounds are normal.      Palpations: Abdomen is soft. Musculoskeletal:         General: Normal range of motion. Cervical back: Normal range of motion and neck supple. Skin:     General: Skin is warm and dry. Capillary Refill: Capillary refill takes less than 2 seconds. Neurological:      General: No focal deficit present. Mental Status: She is alert and oriented to person, place, and time. Mental status is at baseline. Psychiatric:         Mood and Affect: Mood normal.         Behavior: Behavior normal.         Thought Content:  Thought content normal.         Judgment: Judgment normal.

## 2023-12-02 ENCOUNTER — NURSE TRIAGE (OUTPATIENT)
Dept: OTHER | Facility: OTHER | Age: 78
End: 2023-12-02

## 2023-12-02 NOTE — TELEPHONE ENCOUNTER
Regarding: nebulizer request  ----- Message from Teressa Mtz sent at 12/2/2023 11:26 AM EST -----  "I was prescribed Albuterol Sulfate and I don't have a nebulizer can one be prescribed for me?"

## 2023-12-21 DIAGNOSIS — F33.1 MODERATE EPISODE OF RECURRENT MAJOR DEPRESSIVE DISORDER (HCC): ICD-10-CM

## 2023-12-22 RX ORDER — VENLAFAXINE HYDROCHLORIDE 150 MG/1
150 CAPSULE, EXTENDED RELEASE ORAL DAILY
Qty: 90 CAPSULE | Refills: 3 | Status: SHIPPED | OUTPATIENT
Start: 2023-12-22

## 2023-12-26 ENCOUNTER — TELEPHONE (OUTPATIENT)
Dept: FAMILY MEDICINE CLINIC | Facility: CLINIC | Age: 78
End: 2023-12-26

## 2023-12-26 DIAGNOSIS — J44.1 COPD EXACERBATION (HCC): Primary | ICD-10-CM

## 2023-12-26 LAB
DME PARACHUTE DELIVERY DATE REQUESTED: NORMAL
DME PARACHUTE ITEM DESCRIPTION: NORMAL
DME PARACHUTE ORDER STATUS: NORMAL
DME PARACHUTE SUPPLIER NAME: NORMAL
DME PARACHUTE SUPPLIER PHONE: NORMAL

## 2023-12-28 ENCOUNTER — TELEPHONE (OUTPATIENT)
Dept: FAMILY MEDICINE CLINIC | Facility: CLINIC | Age: 78
End: 2023-12-28

## 2023-12-28 NOTE — TELEPHONE ENCOUNTER
Patient health plan asking if its medically neccessary to have her on Hydrocodone AND Diazepam to complete her PA    FAX: [phone]

## 2024-01-02 ENCOUNTER — TELEPHONE (OUTPATIENT)
Dept: FAMILY MEDICINE CLINIC | Facility: CLINIC | Age: 79
End: 2024-01-02

## 2024-01-02 NOTE — TELEPHONE ENCOUNTER
Patient insurance not covering her hydrocodone anymore. She is asking if theres an alternative she can take

## 2024-01-03 ENCOUNTER — TELEPHONE (OUTPATIENT)
Dept: ADMINISTRATIVE | Facility: OTHER | Age: 79
End: 2024-01-03

## 2024-01-03 DIAGNOSIS — M46.1 INFLAMMATION OF SACROILIAC JOINT (HCC): Primary | ICD-10-CM

## 2024-01-03 RX ORDER — OXYCODONE HYDROCHLORIDE AND ACETAMINOPHEN 5; 325 MG/1; MG/1
1 TABLET ORAL EVERY 6 HOURS PRN
Qty: 120 TABLET | Refills: 0 | Status: SHIPPED | OUTPATIENT
Start: 2024-01-03

## 2024-01-03 NOTE — TELEPHONE ENCOUNTER
01/03/24 3:05 PM    Patient contacted (spoke with patient) to bring Advance Directive, POLST, or Living Will document to next scheduled pcp visit.    Thank you.  Billie Belle  [facility]

## 2024-01-08 ENCOUNTER — OFFICE VISIT (OUTPATIENT)
Dept: FAMILY MEDICINE CLINIC | Facility: CLINIC | Age: 79
End: 2024-01-08
Payer: COMMERCIAL

## 2024-01-08 ENCOUNTER — TELEPHONE (OUTPATIENT)
Dept: FAMILY MEDICINE CLINIC | Facility: CLINIC | Age: 79
End: 2024-01-08

## 2024-01-08 VITALS
HEART RATE: 61 BPM | BODY MASS INDEX: 21.95 KG/M2 | DIASTOLIC BLOOD PRESSURE: 76 MMHG | TEMPERATURE: 98.7 F | HEIGHT: 64 IN | SYSTOLIC BLOOD PRESSURE: 128 MMHG | OXYGEN SATURATION: 96 % | WEIGHT: 128.6 LBS

## 2024-01-08 DIAGNOSIS — F02.80 SEMANTIC DEMENTIA WITHOUT BEHAVIORAL DISTURBANCE (HCC): ICD-10-CM

## 2024-01-08 DIAGNOSIS — F11.20 CONTINUOUS OPIOID DEPENDENCE (HCC): ICD-10-CM

## 2024-01-08 DIAGNOSIS — Z12.31 ENCOUNTER FOR SCREENING MAMMOGRAM FOR BREAST CANCER: ICD-10-CM

## 2024-01-08 DIAGNOSIS — F41.9 ANXIETY: ICD-10-CM

## 2024-01-08 DIAGNOSIS — I10 HTN, GOAL BELOW 140/90: ICD-10-CM

## 2024-01-08 DIAGNOSIS — M46.1 INFLAMMATION OF SACROILIAC JOINT (HCC): Primary | ICD-10-CM

## 2024-01-08 DIAGNOSIS — C53.9 MALIGNANT NEOPLASM OF CERVIX, UNSPECIFIED SITE (HCC): ICD-10-CM

## 2024-01-08 DIAGNOSIS — J44.1 COPD EXACERBATION (HCC): ICD-10-CM

## 2024-01-08 DIAGNOSIS — Z12.31 ENCOUNTER FOR SCREENING MAMMOGRAM FOR BREAST CANCER: Primary | ICD-10-CM

## 2024-01-08 DIAGNOSIS — E04.1 THYROID NODULE: ICD-10-CM

## 2024-01-08 DIAGNOSIS — F33.41 RECURRENT MAJOR DEPRESSIVE DISORDER, IN PARTIAL REMISSION (HCC): ICD-10-CM

## 2024-01-08 DIAGNOSIS — D68.9 CLOTTING DISORDER (HCC): ICD-10-CM

## 2024-01-08 DIAGNOSIS — I70.0 ATHEROSCLEROSIS OF ABDOMINAL AORTA (HCC): ICD-10-CM

## 2024-01-08 DIAGNOSIS — I71.43 INFRARENAL ABDOMINAL AORTIC ANEURYSM (AAA) WITHOUT RUPTURE (HCC): ICD-10-CM

## 2024-01-08 DIAGNOSIS — F33.1 MODERATE EPISODE OF RECURRENT MAJOR DEPRESSIVE DISORDER (HCC): ICD-10-CM

## 2024-01-08 DIAGNOSIS — G31.09 SEMANTIC DEMENTIA WITHOUT BEHAVIORAL DISTURBANCE (HCC): ICD-10-CM

## 2024-01-08 DIAGNOSIS — D50.9 IRON DEFICIENCY ANEMIA, UNSPECIFIED IRON DEFICIENCY ANEMIA TYPE: ICD-10-CM

## 2024-01-08 PROCEDURE — 99214 OFFICE O/P EST MOD 30 MIN: CPT | Performed by: FAMILY MEDICINE

## 2024-01-08 RX ORDER — DIAZEPAM 5 MG/1
5 TABLET ORAL 3 TIMES DAILY PRN
Qty: 90 TABLET | Refills: 5 | Status: SHIPPED | OUTPATIENT
Start: 2024-01-08

## 2024-01-08 RX ORDER — OXYCODONE AND ACETAMINOPHEN 10; 325 MG/1; MG/1
1 TABLET ORAL EVERY 4 HOURS PRN
Qty: 120 TABLET | Refills: 0 | Status: SHIPPED | OUTPATIENT
Start: 2024-01-08

## 2024-01-08 NOTE — PATIENT INSTRUCTIONS
For the next 4 weeks, take 2.5 mg of Valium in the AM, 5 mg in the afternoon, and 5 mg in the evening.    For the next 4 weeks, take 2.5 mg of Valium in the AM, 2.5 mg in the afternoon, and 5 mg in the evening.    For the next 4 weeks, take 2.5 mg of Valium in the AM, 2.5 mg in the afternoon, and 2.5 mg in the evening.

## 2024-01-09 NOTE — PROGRESS NOTES
Assessment/Plan:    No problem-specific Assessment & Plan notes found for this encounter.       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)    COPD exacerbation (HCC)  -     umeclidinium-vilanterol 62.5-25 mcg/actuation inhaler; Inhale 1 puff daily    Continuous opioid dependence (HCC)    Recurrent major depressive disorder, in partial remission (HCC)  -     CBC and differential; Future    Malignant neoplasm of cervix, unspecified site (HCC)    Atherosclerosis of abdominal aorta (HCC)  -     Lipid panel; Future  -     Comprehensive metabolic panel; Future  -     CBC and differential; Future    Clotting disorder (HCC)    Anxiety  -     diazepam (VALIUM) 5 mg tablet; Take 1 tablet (5 mg total) by mouth 3 (three) times a day as needed for anxiety    Thyroid nodule    HTN, goal below 140/90    Infrarenal abdominal aortic aneurysm (AAA) without rupture (HCC)    Iron deficiency anemia, unspecified iron deficiency anemia type  -     Iron Panel (Includes Ferritin, Iron Sat%, Iron, and TIBC); Future    Other orders  -     Cancel: influenza vaccine, high-dose, PF 0.7 mL (FLUZONE HIGH-DOSE)          Subjective:      Patient ID: Stefani Greer is a 78 y.o. female.    Her pain is fairly well controlled on her current regimen.  She has no side effects.    She would like to taper off Valium.  We discussed a strategy at length.    Her BP is at goal on her current regimen        The following portions of the patient's history were reviewed and updated as  appropriate: She  has a past medical history of Elevated liver enzymes, Fracture of rib, HPV (human papilloma virus) infection, Osteoporosis, Pulmonary nodule, and Thyroid nodule.  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) 11/27/2020    Continuous opioid dependence (HCC) 11/27/2020    Recurrent major depressive disorder, in partial remission (HCC) 11/27/2020    Atherosclerosis of abdominal aorta (HCC) 11/27/2020    Clotting disorder (HCC) 02/21/2020    BMI 26.0-26.9,adult 05/10/2019    Smoker 05/10/2019    Medicare annual wellness visit, subsequent 05/10/2019    Chronic groin pain, right 12/19/2018    Thyroid nodule 10/16/2018    Chronic pain of right knee 10/16/2018    Pulmonary nodules 09/11/2018    Cytologic evidence of malignancy on smear of cervix 09/11/2018    Asymptomatic microscopic hematuria 06/11/2018    Rash of face 06/11/2018    Essential hypertension 03/22/2018    Other hyperlipidemia 03/22/2018    Screening for genitourinary condition 03/22/2018    Screening for neurological condition 03/22/2018    COPD with acute exacerbation (HCC) 12/13/2017    Hyperlipidemia 08/31/2015    Hypertension 08/31/2015    Inflammation of sacroiliac joint (HCC) 08/29/2015    Lumbar disc disease 08/18/2014    High risk medication use 11/13/2013    HTN, goal below 140/90 08/24/2011     She  has a past surgical history that includes Appendectomy; Back surgery; Cholecystectomy; Foot surgery; Total hip arthroplasty (Right); Total abdominal hysterectomy; Colposcopy; Colonoscopy w/ biopsies; US guided thyroid biopsy; and Cataract extraction.  Her family history includes  Diabetes type II in her mother; Heart disease in her father and mother.  She  reports that she has been smoking cigarettes. She has a 17.5 pack-year smoking history. She has never used smokeless tobacco. She reports that she does not drink alcohol and does not use drugs.  Current Outpatient Medications   Medication Sig Dispense Refill    albuterol (2.5 mg/3 mL) 0.083 % nebulizer solution Take 3 mL (2.5 mg total) by nebulization every 6 (six) hours as needed for wheezing or shortness of breath 300 mL 5    atenolol (TENORMIN) 25 mg tablet take 2 tablet by mouth once daily 180 tablet 3    atorvastatin (LIPITOR) 40 mg tablet Take 1 tablet (40 mg total) by mouth daily 90 tablet 3    azelastine (ASTELIN) 0.1 % nasal spray 1 spray into each nostril 2 (two) times a day Use in each nostril as directed 30 mL 5    Combivent Respimat inhaler Inhale 1 puff 4 (four) times a day 4 g 5    diazepam (VALIUM) 5 mg tablet Take 1 tablet (5 mg total) by mouth 3 (three) times a day as needed for anxiety 90 tablet 5    ipratropium (ATROVENT) 0.03 % nasal spray 2 sprays into each nostril every 12 (twelve) hours 30 mL 5    Magnesium 500 MG TABS Take 1 tablet by mouth daily      oxyCODONE-acetaminophen (Percocet)  mg per tablet Take 1 tablet by mouth every 4 (four) hours as needed for moderate pain Max Daily Amount: 6 tablets 120 tablet 0    oxyCODONE-acetaminophen (Percocet) 5-325 mg per tablet Take 1 tablet by mouth every 6 (six) hours as needed for moderate pain Max Daily Amount: 4 tablets 120 tablet 0    umeclidinium-vilanterol 62.5-25 mcg/actuation inhaler Inhale 1 puff daily 180 blister 3    venlafaxine (EFFEXOR-XR) 150 mg 24 hr capsule take 1 capsule by mouth once daily 90 capsule 3     No current facility-administered medications for this visit.     Current Outpatient Medications on File Prior to Visit   Medication Sig    albuterol (2.5 mg/3 mL) 0.083 % nebulizer solution Take 3 mL (2.5 mg total) by nebulization every 6 (six)  "hours as needed for wheezing or shortness of breath    atenolol (TENORMIN) 25 mg tablet take 2 tablet by mouth once daily    atorvastatin (LIPITOR) 40 mg tablet Take 1 tablet (40 mg total) by mouth daily    azelastine (ASTELIN) 0.1 % nasal spray 1 spray into each nostril 2 (two) times a day Use in each nostril as directed    Combivent Respimat inhaler Inhale 1 puff 4 (four) times a day    ipratropium (ATROVENT) 0.03 % nasal spray 2 sprays into each nostril every 12 (twelve) hours    Magnesium 500 MG TABS Take 1 tablet by mouth daily    oxyCODONE-acetaminophen (Percocet) 5-325 mg per tablet Take 1 tablet by mouth every 6 (six) hours as needed for moderate pain Max Daily Amount: 4 tablets    venlafaxine (EFFEXOR-XR) 150 mg 24 hr capsule take 1 capsule by mouth once daily     No current facility-administered medications on file prior to visit.     She has No Known Allergies..    Review of Systems   All other systems reviewed and are negative.        Objective:      /76 (BP Location: Left arm, Patient Position: Sitting)   Pulse 61   Temp 98.7 °F (37.1 °C) (Tympanic)   Ht 5' 4\" (1.626 m)   Wt 58.3 kg (128 lb 9.6 oz)   SpO2 96%   BMI 22.07 kg/m²          Physical Exam  Vitals and nursing note reviewed.   Constitutional:       Appearance: Normal appearance. She is normal weight.   Cardiovascular:      Rate and Rhythm: Normal rate and regular rhythm.      Pulses: Normal pulses.      Heart sounds: Normal heart sounds.   Pulmonary:      Effort: Pulmonary effort is normal.      Breath sounds: Normal breath sounds.   Abdominal:      General: Abdomen is flat. Bowel sounds are normal.      Palpations: Abdomen is soft.   Musculoskeletal:         General: Normal range of motion.      Cervical back: Normal range of motion and neck supple.   Skin:     General: Skin is warm and dry.      Capillary Refill: Capillary refill takes less than 2 seconds.   Neurological:      General: No focal deficit present.      Mental Status: " She is alert and oriented to person, place, and time. Mental status is at baseline.   Psychiatric:         Mood and Affect: Mood normal.         Behavior: Behavior normal.         Thought Content: Thought content normal.         Judgment: Judgment normal.

## 2024-01-30 NOTE — PROGRESS NOTES
[facility]  coding opportunities          Number of diagnosis code(s) already on the problem list added to FYI flag: 10     Chart Reviewed * (Number of) Inbasket suggestions sent to Provider: 1            Number of suggestions used: 9      Number of suggestions NOT actually used: 2     Patients insurance company: [employer]     Visit status: Patient arrived for their scheduled appointment     Provider never responded to [facility]  coding request     [place] [id number]  coding opportunities     DX: I71 4 Abdominal aortic aneurysm (AAA) without rupture  Assessed 11/29/21    Please review/recertify the BPA/HCC diagnoses for 2022  If this is correct, please document and assess at your next visit on 1/7/2022             Number of diagnosis code(s) already on the problem list added to FYI flag: 10     Chart Reviewed * (Number of) Inbasket suggestions sent to Provider: 1                  Patients insurance company: [employer] no

## 2024-02-11 DIAGNOSIS — M46.1 INFLAMMATION OF SACROILIAC JOINT (HCC): ICD-10-CM

## 2024-02-12 RX ORDER — OXYCODONE HYDROCHLORIDE AND ACETAMINOPHEN 5; 325 MG/1; MG/1
1 TABLET ORAL EVERY 6 HOURS PRN
Qty: 120 TABLET | Refills: 0 | Status: SHIPPED | OUTPATIENT
Start: 2024-02-12

## 2024-02-20 ENCOUNTER — TELEPHONE (OUTPATIENT)
Dept: FAMILY MEDICINE CLINIC | Facility: CLINIC | Age: 79
End: 2024-02-20

## 2024-02-20 NOTE — TELEPHONE ENCOUNTER
----- Message from Serge Ramsey MD sent at 2/20/2024 10:02 AM EST -----  Your mammogram is normal.

## 2024-02-21 PROBLEM — Z00.00 MEDICARE ANNUAL WELLNESS VISIT, SUBSEQUENT: Status: RESOLVED | Noted: 2019-05-10 | Resolved: 2024-02-21

## 2024-02-21 PROBLEM — Z13.89 SCREENING FOR GENITOURINARY CONDITION: Status: RESOLVED | Noted: 2018-03-22 | Resolved: 2024-02-21

## 2024-02-21 PROBLEM — Z13.89 SCREENING FOR NEUROLOGICAL CONDITION: Status: RESOLVED | Noted: 2018-03-22 | Resolved: 2024-02-21

## 2024-03-17 DIAGNOSIS — M46.1 INFLAMMATION OF SACROILIAC JOINT (HCC): ICD-10-CM

## 2024-03-18 RX ORDER — OXYCODONE HYDROCHLORIDE AND ACETAMINOPHEN 5; 325 MG/1; MG/1
1 TABLET ORAL EVERY 6 HOURS PRN
Qty: 120 TABLET | Refills: 0 | Status: SHIPPED | OUTPATIENT
Start: 2024-03-18

## 2024-03-18 NOTE — TELEPHONE ENCOUNTER
[facility]   Yuko Barrios  Department of Neurology  [address] [place] [address] [place]  [id number]  Phone [phone]     Fax  [phone]        Full Name: Leticia Piña Gender: Female  Patient ID: [id number] YOB: 1950      Visit Date: 8/22/2023 10:18 AM  Age: 73 Years  Examining Physician: Baylee Henao M.D.  Referring Physician: Nicole Jasmine MD  History: C/O: Slight coolness distally in bilateral feet.  PMHX: HTN, hypothyroidism, RA, MDD, insomnia.      SUMMARY       Nerve conduction studies were performed in the right and left lower extremity. The right peroneal motor study recording the extensor digitorum brevis showed a normal amplitude, normal distal latency and normal conduction velocity. No conduction block or focal slowing was present across the fibular neck. The right tibial motor study recording the abductor hallucis brevis showed a normal amplitude, normal distal latency and normal conduction velocity.     Right sural sensory response showed a normal amplitude and conduction velocity. Right superficial peroneal sensory response showed a normal amplitude and conduction velocity.     The left peroneal motor study recording the extensor digitorum brevis showed a normal amplitude, normal distal latency and normal conduction velocity. No conduction block or focal slowing was present across the fibular neck. The left tibial motor study recording the abductor hallucis brevis showed a normal amplitude, normal distal latency and normal conduction velocity.     Left sural sensory response showed a normal amplitude and conduction velocity. Left superficial peroneal sensory response showed a normal amplitude and conduction velocity.         IMPRESSION     This is a normal study.     There is no electrophysiologic evidence of lumbosacral plexopathy, or peripheral neuropathy of either the right or left lower extremity.    ---------------------------------             Baylee Henao  PDMP Checked and WNL.    M.D., F.A.A.N.      Diplomate, American Board of Psychiatry and Neurology  Diplomate, American Board of Clinical Neurophysiology  Fellow, American Academy of Neurology           SENSORY NCS      Nerve / Sites Rec. Site Peak PP Amp Dist Luis     ms µV cm m/s   L Superficial peroneal      Lat Leg Ankle 3.40 5.0 10 38.4      Ref.  4.60 3.0  40.0   L Sural - Lat Mall      Calf Lat Mall 3.06 8.5 14 57.9      Ref.  4.60 3.0  40.0   R Superficial peroneal      Lat Leg Ankle 3.40 5.9 10 38.4      Ref.  4.60 3.0  40.0   R Sural - Lat Mall      Calf Lat Mall 3.69 8.5 14 47.3      Ref.  4.60 3.0  40.0       MOTOR NCS      Nerve / Sites Rec. Site Lat Amp Dist Luis     ms mV cm m/s   L Peroneal - EDB      Ankle EDB 4.50 6.0 8       Ref.  6.00 2.5        FibHead EDB 12.50 5.8 33 41.2      Ref.     40.0      Knee EDB 15.00 5.6 10 40.0   L Tibial - AH      Ankle AH 3.71 9.0 8       Ref.  6.00 4.0        Knee AH 12.52 5.1 39 44.3      Ref.     40.0   R Peroneal - EDB      Ankle EDB 4.50 4.1 8       Ref.  6.00 2.5        FibHead EDB 12.44 4.0 35 44.1      Ref.     40.0      Knee EDB 14.69 3.6 10 44.4   R Tibial - AH      Ankle AH 4.35 9.0 8       Ref.  6.00 4.0        Knee AH 13.75 5.5 39 41.5      Ref.     40.0                   ---------------------------------             Baylee Henao M.D., F.A.A.N.      Diplomate, American Board of Psychiatry and Neurology  Diplomate, American Board of Clinical Neurophysiology  Fellow, American Academy of Neurology

## 2024-03-28 ENCOUNTER — RA CDI HCC (OUTPATIENT)
Dept: OTHER | Facility: HOSPITAL | Age: 79
End: 2024-03-28

## 2024-04-06 ENCOUNTER — RA CDI HCC (OUTPATIENT)
Dept: OTHER | Facility: HOSPITAL | Age: 79
End: 2024-04-06

## 2024-04-07 NOTE — PROGRESS NOTES
HCC coding opportunities     J44.9     Chart Reviewed number of suggestions sent to Provider: 1   GR    Patients Insurance     Medicare Insurance: [employer]

## 2024-04-11 ENCOUNTER — TELEPHONE (OUTPATIENT)
Dept: ADMINISTRATIVE | Facility: OTHER | Age: 79
End: 2024-04-11

## 2024-04-11 NOTE — TELEPHONE ENCOUNTER
04/11/24 2:00 PM    Patient contacted (no answer/ line busy) to bring Advance Directive, POLST, or Living Will document to next scheduled pcp visit.    Thank you.  Maria D Ham MA  [facility]

## 2024-04-12 ENCOUNTER — OFFICE VISIT (OUTPATIENT)
Dept: FAMILY MEDICINE CLINIC | Facility: CLINIC | Age: 79
End: 2024-04-12
Payer: COMMERCIAL

## 2024-04-12 VITALS
OXYGEN SATURATION: 96 % | WEIGHT: 136.4 LBS | HEIGHT: 64 IN | DIASTOLIC BLOOD PRESSURE: 80 MMHG | SYSTOLIC BLOOD PRESSURE: 126 MMHG | HEART RATE: 59 BPM | TEMPERATURE: 97.6 F | BODY MASS INDEX: 23.29 KG/M2

## 2024-04-12 DIAGNOSIS — M51.9 LUMBAR DISC DISEASE: ICD-10-CM

## 2024-04-12 DIAGNOSIS — E04.1 THYROID NODULE: ICD-10-CM

## 2024-04-12 DIAGNOSIS — N18.5 CHRONIC RENAL DISEASE, STAGE V (HCC): ICD-10-CM

## 2024-04-12 DIAGNOSIS — D68.59 HYPERCOAGULABLE STATE (HCC): Primary | ICD-10-CM

## 2024-04-12 DIAGNOSIS — E53.8 B12 DEFICIENCY: ICD-10-CM

## 2024-04-12 DIAGNOSIS — E53.8 FOLATE DEFICIENCY: ICD-10-CM

## 2024-04-12 DIAGNOSIS — C53.9 MALIGNANT NEOPLASM OF CERVIX, UNSPECIFIED SITE (HCC): ICD-10-CM

## 2024-04-12 DIAGNOSIS — I10 ESSENTIAL HYPERTENSION: ICD-10-CM

## 2024-04-12 PROCEDURE — 99214 OFFICE O/P EST MOD 30 MIN: CPT | Performed by: FAMILY MEDICINE

## 2024-04-12 PROCEDURE — G2211 COMPLEX E/M VISIT ADD ON: HCPCS | Performed by: FAMILY MEDICINE

## 2024-04-12 RX ORDER — POLYETHYLENE GLYCOL 400 AND PROPYLENE GLYCOL 4; 3 MG/ML; MG/ML
SOLUTION/ DROPS OPHTHALMIC AS NEEDED
COMMUNITY

## 2024-04-12 RX ORDER — OXYCODONE AND ACETAMINOPHEN 10; 325 MG/1; MG/1
1 TABLET ORAL EVERY 6 HOURS PRN
Qty: 120 TABLET | Refills: 0 | Status: SHIPPED | OUTPATIENT
Start: 2024-04-12

## 2024-04-12 NOTE — PROGRESS NOTES
Assessment/Plan:    No problem-specific Assessment & Plan notes found for this encounter.       Diagnoses and all orders for this visit:    Hypercoagulable state (HCC)  -     MTHFR mutation; Future    Chronic renal disease, stage V (HCC)  -     CBC (Includes Diff/Plt) (Refl); Future  -     Comprehensive metabolic panel; Future    Malignant neoplasm of cervix, unspecified site (HCC)    Essential hypertension  -     TSH, 3rd generation with Free T4 reflex; Future    Thyroid nodule    B12 deficiency  -     Vitamin B12; Future  -     Folate; Future    Folate deficiency  -     Vitamin B12; Future  -     Folate; Future    Other orders  -     polyethylene glycol-propylene glycol (Systane) 0.4-0.3 %; Apply to eye as needed          Subjective:      Patient ID: Stefani Greer is a 78 y.o. female.    She denies c/o at present.    She has no CP or SOB.  She has no PND or orthopnea.    She has chronic pain.  She is dong well on her current pain regimen and she has no side effects.      The following portions of the patient's history were reviewed and updated as appropriate: She  has a past medical history of Elevated liver enzymes, Fracture of rib, HPV (human papilloma virus) infection, Osteoporosis, Pulmonary nodule, and Thyroid nodule.  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) 11/27/2020   • Continuous opioid dependence (HCC) 11/27/2020   • Recurrent major depressive disorder, in partial remission (HCC) 11/27/2020   • Atherosclerosis of abdominal aorta (HCC) 11/27/2020   • Clotting disorder ([facility]) 02/21/2020   • BMI 26.0-26.9,adult 05/10/2019   • Smoker 05/10/2019    • Chronic groin pain, right 12/19/2018   • Thyroid nodule 10/16/2018   • Chronic pain of right knee 10/16/2018   • Pulmonary nodules 09/11/2018   • Cytologic evidence of malignancy on smear of cervix 09/11/2018   • Asymptomatic microscopic hematuria 06/11/2018   • Rash of face 06/11/2018   • Essential hypertension 03/22/2018   • Other hyperlipidemia 03/22/2018   • COPD with acute exacerbation (HCC) 12/13/2017   • Hyperlipidemia 08/31/2015   • Hypertension 08/31/2015   • Inflammation of sacroiliac joint (HCC) 08/29/2015   • Lumbar disc disease 08/18/2014   • High risk medication use 11/13/2013   • HTN, goal below 140/90 08/24/2011     She  has a past surgical history that includes Appendectomy; Back surgery; Cholecystectomy; Foot surgery; Total hip arthroplasty (Right); Total abdominal hysterectomy; Colposcopy; Colonoscopy w/ biopsies; US guided thyroid biopsy; and Cataract extraction.  Her family history includes Diabetes type II in her mother; Heart disease in her father and mother.  She  reports that she has been smoking cigarettes. She has a 17.5 pack-year smoking history. She has never used smokeless tobacco. She reports that she does not drink alcohol and does not use drugs.  Current Outpatient Medications   Medication Sig Dispense Refill   • albuterol (2.5 mg/3 mL) 0.083 % nebulizer solution Take 3 mL (2.5 mg total) by nebulization every 6 (six) hours as needed for wheezing or shortness of breath 300 mL 5   • atenolol (TENORMIN) 25 mg tablet take 2 tablet by mouth once daily 180 tablet 3   • atorvastatin (LIPITOR) 40 mg tablet Take 1 tablet (40 mg total) by mouth daily 90 tablet 3   • azelastine (ASTELIN) 0.1 % nasal spray 1 spray into each nostril 2 (two) times a day Use in each nostril as directed (Patient taking differently: 1 spray into each nostril as needed Use in each nostril as directed) 30 mL 5   • diazepam (VALIUM) 5 mg tablet Take 1 tablet (5 mg total) by mouth 3 (three) times a day as needed for  anxiety 90 tablet 5   • ipratropium (ATROVENT) 0.03 % nasal spray 2 sprays into each nostril every 12 (twelve) hours 30 mL 5   • Magnesium 500 MG TABS Take 1 tablet by mouth daily     • polyethylene glycol-propylene glycol (Systane) 0.4-0.3 % Apply to eye as needed     • venlafaxine (EFFEXOR-XR) 150 mg 24 hr capsule take 1 capsule by mouth once daily 90 capsule 3   • Combivent Respimat inhaler Inhale 1 puff 4 (four) times a day (Patient not taking: Reported on 4/12/2024) 4 g 5   • oxyCODONE-acetaminophen (Percocet)  mg per tablet Take 1 tablet by mouth every 4 (four) hours as needed for moderate pain Max Daily Amount: 6 tablets (Patient not taking: Reported on 4/12/2024) 120 tablet 0   • umeclidinium-vilanterol 62.5-25 mcg/actuation inhaler Inhale 1 puff daily (Patient not taking: Reported on 4/12/2024) 180 blister 3     No current facility-administered medications for this visit.     Current Outpatient Medications on File Prior to Visit   Medication Sig   • albuterol (2.5 mg/3 mL) 0.083 % nebulizer solution Take 3 mL (2.5 mg total) by nebulization every 6 (six) hours as needed for wheezing or shortness of breath   • atenolol (TENORMIN) 25 mg tablet take 2 tablet by mouth once daily   • atorvastatin (LIPITOR) 40 mg tablet Take 1 tablet (40 mg total) by mouth daily   • azelastine (ASTELIN) 0.1 % nasal spray 1 spray into each nostril 2 (two) times a day Use in each nostril as directed (Patient taking differently: 1 spray into each nostril as needed Use in each nostril as directed)   • diazepam (VALIUM) 5 mg tablet Take 1 tablet (5 mg total) by mouth 3 (three) times a day as needed for anxiety   • ipratropium (ATROVENT) 0.03 % nasal spray 2 sprays into each nostril every 12 (twelve) hours   • Magnesium 500 MG TABS Take 1 tablet by mouth daily   • polyethylene glycol-propylene glycol (Systane) 0.4-0.3 % Apply to eye as needed   • venlafaxine (EFFEXOR-XR) 150 mg 24 hr capsule take 1 capsule by mouth once daily   •  "[DISCONTINUED] oxyCODONE-acetaminophen (PERCOCET) 5-325 mg per tablet Take 1 tablet by mouth every 6 (six) hours as needed for moderate pain Max Daily Amount: 4 tablets   • Combivent Respimat inhaler Inhale 1 puff 4 (four) times a day (Patient not taking: Reported on 4/12/2024)   • oxyCODONE-acetaminophen (Percocet)  mg per tablet Take 1 tablet by mouth every 4 (four) hours as needed for moderate pain Max Daily Amount: 6 tablets (Patient not taking: Reported on 4/12/2024)   • umeclidinium-vilanterol 62.5-25 mcg/actuation inhaler Inhale 1 puff daily (Patient not taking: Reported on 4/12/2024)     No current facility-administered medications on file prior to visit.     She has No Known Allergies..    Review of Systems   All other systems reviewed and are negative.        Objective:      /80 (BP Location: Left arm, Patient Position: Sitting)   Pulse 59   Temp 97.6 °F (36.4 °C) (Tympanic)   Ht 5' 4\" (1.626 m)   Wt 61.9 kg (136 lb 6.4 oz)   SpO2 96%   BMI 23.41 kg/m²          Physical Exam  Vitals and nursing note reviewed.   Constitutional:       Appearance: Normal appearance. She is normal weight.   Cardiovascular:      Rate and Rhythm: Normal rate and regular rhythm.      Pulses: Normal pulses.      Heart sounds: Normal heart sounds.   Pulmonary:      Effort: Pulmonary effort is normal.      Breath sounds: Normal breath sounds.   Abdominal:      General: Abdomen is flat. Bowel sounds are normal.      Palpations: Abdomen is soft.   Musculoskeletal:         General: Normal range of motion.      Cervical back: Normal range of motion and neck supple.   Skin:     General: Skin is warm and dry.      Capillary Refill: Capillary refill takes less than 2 seconds.   Neurological:      General: No focal deficit present.      Mental Status: She is alert and oriented to person, place, and time. Mental status is at baseline.   Psychiatric:         Mood and Affect: Mood normal.         Behavior: Behavior normal.    "      Thought Content: Thought content normal.         Judgment: Judgment normal.       Depression Screening Follow-up Plan: Patient's depression screening was positive with a PHQ-2 score of . Their PHQ-9 score was . Patient assessed for underlying major depression. They have no active suicidal ideations. Brief counseling provided and recommend additional follow-up/re-evaluation next office visit.

## 2024-04-12 NOTE — PATIENT INSTRUCTIONS
Depression   AMBULATORY CARE:   Depression  is a mood disorder that causes feelings of sadness or hopelessness that do not go away. Depression may cause you to lose interest in things you used to enjoy. These feelings may interfere with your daily life.  Common signs and symptoms:   Appetite changes, or weight gain or loss    Trouble falling or staying asleep, or sleeping too much    Fatigue (being mentally and physically tired) or lack of energy    Feeling restless, irritable, or withdrawn    Feeling worthless, hopeless, discouraged, or guilty    Trouble concentrating, remembering things, doing daily tasks, or making decisions    Thoughts about hurting or killing yourself    Call your local emergency number (911 in the [place]) if:   You think about hurting yourself or someone else.    You have done something on purpose to hurt yourself.    Call your therapist or doctor if:   Your symptoms get worse or do not get better with treatment.    Your depression keeps you from doing your regular daily activities.    You have new symptoms since your last visit.    You have questions or concerns about your condition or care.    The following resources are available at any time to help you, if needed:   Contact a suicide prevention organization:        For the [employer] Suicide and Crisis Lifeline:     Call or text [employer]     Send a chat on https://Simply Zesty.org/chat     Call 4-291-868-1796 (1-800-273-TALK)    For the Suicide Hotline, call 4-491-637-4878 (1-988-FHLEBNO)    For a list of international numbers: https://save.org/find-help/international-resources/  Treatment for depression  depends on how severe your symptoms are. You may need any of the following:  Cognitive behavioral therapy (CBT)  teaches you how to identify and change negative thought patterns.    Antidepressant medicine  may be given to decrease or manage symptoms. You may need to take this medicine for several weeks before they start working.    Self-care:   Talk to  someone about your depression.  Your healthcare provider may suggest counseling. You might feel more comfortable talking with a friend or family member about your depression. Choose someone you know will be supportive and encouraging.    Get regular physical activity.  Physical activity can lower your stress, improve your mood, and help you sleep better. Work with your healthcare provider to develop a plan that you enjoy.         Create a regular sleep schedule.  A routine can help you relax before bed. Listen to music, read, or do yoga. Try to go to bed and wake up at the same time every day. Sleep is important for emotional health.    Eat a variety of healthy foods.  Healthy foods include fruits, vegetables, whole-grain breads, low-fat dairy products, lean meats, fish, and cooked beans. A healthy meal plan is low in fat, salt, and added sugar.         Do not use alcohol, drugs, or nicotine products.  These can worsen depression or make it hard to manage. Talk to your therapist or healthcare provider if you use any of these products and need help to quit.    Follow up with your therapist or doctor as directed:  Your healthcare provider will monitor your progress at follow-up visits. Your provider will also monitor your medicine if you take antidepressants and ask if the medicine is helping. Tell your provider about any side effects or problems you have with your medicine. The type or amount of medicine may need to be changed. Write down your questions so you remember to ask them during your visits.  For more information or support:   [employer]  [address]  [place] [address]  Phone: 3- 197 - [phone]  Phone: 9- 352 - 764-2392  Web Address: http://www.clark.org  [address] Suicide and Crisis Lifeline  [address]  [place] [address]  Phone: 6- 236 - 708  Web Address: http://www.suicidepreventionlifeline.org OR https://Audiam.org/chat/    © Copyright [employer] 2023  Information is for End User's use only and may not be sold, redistributed or otherwise used for commercial purposes.  The above information is an  only. It is not intended as medical advice for individual conditions or treatments. Talk to your doctor, nurse or pharmacist before following any medical regimen to see if it is safe and effective for you.

## 2024-04-14 DIAGNOSIS — J43.2 CENTRILOBULAR EMPHYSEMA (HCC): ICD-10-CM

## 2024-04-14 DIAGNOSIS — F11.20 OPIOID DEPENDENCE, UNCOMPLICATED (HCC): ICD-10-CM

## 2024-04-14 DIAGNOSIS — M46.1 INFLAMMATION OF SACROILIAC JOINT (HCC): ICD-10-CM

## 2024-04-14 DIAGNOSIS — Z12.2 ENCOUNTER FOR SCREENING FOR LUNG CANCER: ICD-10-CM

## 2024-04-14 DIAGNOSIS — Z87.891 PERSONAL HISTORY OF NICOTINE DEPENDENCE: ICD-10-CM

## 2024-04-14 DIAGNOSIS — M54.42 BILATERAL LOW BACK PAIN WITH BILATERAL SCIATICA, UNSPECIFIED CHRONICITY: ICD-10-CM

## 2024-04-14 DIAGNOSIS — F33.41 RECURRENT MAJOR DEPRESSIVE DISORDER, IN PARTIAL REMISSION (HCC): ICD-10-CM

## 2024-04-14 DIAGNOSIS — G31.09 SEMANTIC DEMENTIA WITHOUT BEHAVIORAL DISTURBANCE (HCC): ICD-10-CM

## 2024-04-14 DIAGNOSIS — I71.40 ABDOMINAL AORTIC ANEURYSM (AAA) WITHOUT RUPTURE (HCC): ICD-10-CM

## 2024-04-14 DIAGNOSIS — M54.41 BILATERAL LOW BACK PAIN WITH BILATERAL SCIATICA, UNSPECIFIED CHRONICITY: ICD-10-CM

## 2024-04-14 DIAGNOSIS — C53.9 MALIGNANT NEOPLASM OF CERVIX, UNSPECIFIED SITE (HCC): ICD-10-CM

## 2024-04-14 DIAGNOSIS — D68.9 CLOTTING DISORDER (HCC): ICD-10-CM

## 2024-04-14 DIAGNOSIS — F02.80 SEMANTIC DEMENTIA WITHOUT BEHAVIORAL DISTURBANCE (HCC): ICD-10-CM

## 2024-04-14 DIAGNOSIS — Z23 IMMUNIZATION DUE: ICD-10-CM

## 2024-04-14 DIAGNOSIS — Z13.820 SCREENING FOR OSTEOPOROSIS: ICD-10-CM

## 2024-04-15 RX ORDER — ATORVASTATIN CALCIUM 40 MG/1
40 TABLET, FILM COATED ORAL DAILY
Qty: 90 TABLET | Refills: 3 | Status: SHIPPED | OUTPATIENT
Start: 2024-04-15

## 2024-05-07 ENCOUNTER — TELEPHONE (OUTPATIENT)
Dept: FAMILY MEDICINE CLINIC | Facility: CLINIC | Age: 79
End: 2024-05-07

## 2024-05-23 DIAGNOSIS — M51.9 LUMBAR DISC DISEASE: ICD-10-CM

## 2024-05-23 RX ORDER — OXYCODONE AND ACETAMINOPHEN 10; 325 MG/1; MG/1
1 TABLET ORAL EVERY 6 HOURS PRN
Qty: 120 TABLET | Refills: 0 | Status: SHIPPED | OUTPATIENT
Start: 2024-05-23

## 2024-06-21 PROBLEM — J44.1 COPD EXACERBATION (HCC): Status: ACTIVE | Noted: 2017-12-13

## 2024-06-27 DIAGNOSIS — M51.9 LUMBAR DISC DISEASE: ICD-10-CM

## 2024-06-27 RX ORDER — OXYCODONE AND ACETAMINOPHEN 10; 325 MG/1; MG/1
1 TABLET ORAL EVERY 6 HOURS PRN
Qty: 120 TABLET | Refills: 0 | Status: SHIPPED | OUTPATIENT
Start: 2024-06-27

## 2024-06-27 NOTE — TELEPHONE ENCOUNTER
Reason for call:   [x] Refill   [] Prior Auth  [] Other:     Office:   [x] PCP/Provider - Serge Ramsey MD   [] Specialty/Provider -     Medication: oxyCODONE-acetaminophen (Percocet)  mg per tablet     Dose/Frequency: 1 tablet, Oral, Every 6 hours PRN     Quantity: 120 tablet     Pharmacy: [id number] [place]     Does the patient have enough for 3 days?   [x] Yes   [] No - Send as HP to POD

## 2024-07-10 ENCOUNTER — TELEPHONE (OUTPATIENT)
Age: 79
End: 2024-07-10

## 2024-07-10 DIAGNOSIS — M46.1 INFLAMMATION OF SACROILIAC JOINT (HCC): ICD-10-CM

## 2024-07-10 RX ORDER — OXYCODONE HYDROCHLORIDE AND ACETAMINOPHEN 5; 325 MG/1; MG/1
1 TABLET ORAL EVERY 6 HOURS PRN
Qty: 120 TABLET | Refills: 0 | Status: SHIPPED | OUTPATIENT
Start: 2024-07-10

## 2024-07-10 NOTE — TELEPHONE ENCOUNTER
Pharmacy is calling to clarify if patient should be receiving both oxycodone 10 mg and 5 mg. Please advise

## 2024-07-15 ENCOUNTER — TELEPHONE (OUTPATIENT)
Age: 79
End: 2024-07-15

## 2024-07-15 NOTE — TELEPHONE ENCOUNTER
Patient call to reschedule appointment on 7/22. Patient stated she can come later that week. Unfortunately I did not see anything available in providers schedule at this time. Patient would like next available appointment. Please return patient call to reschedule.

## 2024-07-30 ENCOUNTER — OFFICE VISIT (OUTPATIENT)
Dept: FAMILY MEDICINE CLINIC | Facility: CLINIC | Age: 79
End: 2024-07-30
Payer: COMMERCIAL

## 2024-07-30 VITALS
WEIGHT: 137.6 LBS | TEMPERATURE: 96.2 F | HEART RATE: 51 BPM | SYSTOLIC BLOOD PRESSURE: 122 MMHG | BODY MASS INDEX: 23.49 KG/M2 | DIASTOLIC BLOOD PRESSURE: 70 MMHG | OXYGEN SATURATION: 95 % | HEIGHT: 64 IN

## 2024-07-30 DIAGNOSIS — D68.9 CLOTTING DISORDER (HCC): ICD-10-CM

## 2024-07-30 DIAGNOSIS — G31.09 SEMANTIC DEMENTIA WITHOUT BEHAVIORAL DISTURBANCE (HCC): ICD-10-CM

## 2024-07-30 DIAGNOSIS — M25.512 CHRONIC LEFT SHOULDER PAIN: ICD-10-CM

## 2024-07-30 DIAGNOSIS — F02.80 SEMANTIC DEMENTIA WITHOUT BEHAVIORAL DISTURBANCE (HCC): ICD-10-CM

## 2024-07-30 DIAGNOSIS — R91.8 PULMONARY NODULES: ICD-10-CM

## 2024-07-30 DIAGNOSIS — I10 ESSENTIAL HYPERTENSION: Primary | ICD-10-CM

## 2024-07-30 DIAGNOSIS — M46.1 INFLAMMATION OF SACROILIAC JOINT (HCC): ICD-10-CM

## 2024-07-30 DIAGNOSIS — E78.2 MIXED HYPERLIPIDEMIA: ICD-10-CM

## 2024-07-30 DIAGNOSIS — C53.9 MALIGNANT NEOPLASM OF CERVIX, UNSPECIFIED SITE (HCC): ICD-10-CM

## 2024-07-30 DIAGNOSIS — E04.1 THYROID NODULE: ICD-10-CM

## 2024-07-30 DIAGNOSIS — J44.1 COPD EXACERBATION (HCC): ICD-10-CM

## 2024-07-30 DIAGNOSIS — G89.29 CHRONIC PAIN OF LEFT HAND: ICD-10-CM

## 2024-07-30 DIAGNOSIS — M79.642 CHRONIC PAIN OF LEFT HAND: ICD-10-CM

## 2024-07-30 DIAGNOSIS — G89.29 CHRONIC LEFT SHOULDER PAIN: ICD-10-CM

## 2024-07-30 DIAGNOSIS — F11.20 CONTINUOUS OPIOID DEPENDENCE (HCC): ICD-10-CM

## 2024-07-30 DIAGNOSIS — J43.2 CENTRILOBULAR EMPHYSEMA (HCC): ICD-10-CM

## 2024-07-30 DIAGNOSIS — F33.41 RECURRENT MAJOR DEPRESSIVE DISORDER, IN PARTIAL REMISSION (HCC): ICD-10-CM

## 2024-07-30 PROBLEM — F33.1 MODERATE EPISODE OF RECURRENT MAJOR DEPRESSIVE DISORDER (HCC): Status: RESOLVED | Noted: 2024-01-08 | Resolved: 2024-07-30

## 2024-07-30 PROCEDURE — 1160F RVW MEDS BY RX/DR IN RCRD: CPT

## 2024-07-30 PROCEDURE — G2211 COMPLEX E/M VISIT ADD ON: HCPCS

## 2024-07-30 PROCEDURE — 1101F PT FALLS ASSESS-DOCD LE1/YR: CPT

## 2024-07-30 PROCEDURE — 99214 OFFICE O/P EST MOD 30 MIN: CPT

## 2024-07-30 PROCEDURE — 1159F MED LIST DOCD IN RCRD: CPT

## 2024-07-30 PROCEDURE — 3074F SYST BP LT 130 MM HG: CPT

## 2024-07-30 PROCEDURE — 3288F FALL RISK ASSESSMENT DOCD: CPT

## 2024-07-30 PROCEDURE — 3078F DIAST BP <80 MM HG: CPT

## 2024-07-30 PROCEDURE — 3725F SCREEN DEPRESSION PERFORMED: CPT

## 2024-07-30 NOTE — ASSESSMENT & PLAN NOTE
Will follow-up pending lab results.  Continue Lipitor 40 mg daily.  Educated on healthy diet and activity.

## 2024-07-30 NOTE — ASSESSMENT & PLAN NOTE
Denies any symptoms of exacerbation. Continue Atrovent, albuterol.  Contact office if exacerbation occurs.

## 2024-07-30 NOTE — ASSESSMENT & PLAN NOTE
Stable.  Continue atenolol, and take at home blood pressures.  Contact office if persistently elevated.

## 2024-07-30 NOTE — PROGRESS NOTES
Ambulatory Visit  Name: Stefani Greer      : 1945      MRN: [id number]  Encounter Provider: Sanjay Gandhi PA-C  Encounter Date: 2024   Encounter department: [facility]    Assessment & Plan   1. Essential hypertension  Assessment & Plan:  Stable.  Continue atenolol, and take at home blood pressures.  Contact office if persistently elevated.  2. Centrilobular emphysema (HCC)  Assessment & Plan:  Continue Atrovent, albuterol.  Contact office if exacerbation occurs.  3. COPD exacerbation (HCC)  Assessment & Plan:  Denies any symptoms of exacerbation. Continue Atrovent, albuterol.  Contact office if exacerbation occurs.  4. Pulmonary nodules  Assessment & Plan:  Stable.  Urged smoking cessation.  5. Thyroid nodule  Assessment & Plan:  Stable for quite some time.  Will follow-up pending lab results.  Orders:  -     TSH, 3rd generation with Free T4 reflex; Future  -     T4; Future  -     T3; Future  6. 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  7. Inflammation of sacroiliac joint (HCC)  Assessment & Plan:  Continue oxycodone as needed.  Contact office if any worsening.  8. Malignant neoplasm of cervix, unspecified site (HCC)  Assessment & Plan:  Managed elsewhere.  9. Clotting disorder (HCC)  Assessment & Plan:  Continue monitoring CBC.  Orders:  -     CBC and differential; Future  -     Comprehensive metabolic panel; Future  10. Continuous opioid dependence (HCC)  Assessment & Plan:  Stable on oxycodone.  Contact office if worsening.  CSA up-to-date.  11. Recurrent major depressive disorder, in partial remission (HCC)  Assessment & Plan:  Denies SI/HI.  Contact office if any worsening.  Continue venlafaxine daily.  12. Mixed hyperlipidemia  Assessment & Plan:  Will follow-up pending lab results.  Continue Lipitor 40 mg daily.  Educated on healthy diet and activity.  Orders:  -     Lipid panel; Future  13.  Chronic left shoulder pain  Assessment & Plan:  Will follow-up pending imaging results.  Orders:  -     XR shoulder 2+ vw left; Future; Expected date: 07/30/2024  -     XR spine cervical complete 4 or 5 vw non injury; Future; Expected date: 07/30/2024  14. Chronic pain of left hand  Assessment & Plan:  Will follow-up pending imaging results.  Orders:  -     XR hand 2 vw left; Future; Expected date: 07/30/2024       History of Present Illness     Patient is a 79-year-old female presenting for follow-up.  Patient states she has been having chronic shoulder and left hand pain.  She would like imaging first.  Denies numbness, tingling, weakness, trauma.  Has not been taking anything for this other than her regularly prescribed oxycodone.  Patient has no other concerns today.        Review of Systems   Constitutional:  Negative for appetite change, chills, diaphoresis, fatigue and fever.   HENT:  Negative for congestion, ear discharge, ear pain, postnasal drip, rhinorrhea, sinus pressure, sinus pain, sneezing and sore throat.    Eyes:  Negative for pain, discharge, redness, itching and visual disturbance.   Respiratory:  Negative for apnea, cough, chest tightness, shortness of breath and wheezing.    Cardiovascular:  Negative for chest pain, palpitations and leg swelling.   Gastrointestinal:  Negative for abdominal pain, blood in stool, constipation, diarrhea, nausea and vomiting.   Endocrine: Negative for cold intolerance, heat intolerance, polydipsia and polyuria.   Genitourinary:  Negative for dysuria, flank pain, frequency, hematuria and urgency.   Musculoskeletal:  Positive for arthralgias and myalgias. Negative for back pain, neck pain and neck stiffness.   Skin:  Negative for color change and rash.   Allergic/Immunologic: Negative for environmental allergies and food allergies.   Neurological:  Negative for dizziness, tremors, seizures, syncope, speech difficulty, weakness, light-headedness, numbness and  headaches.   Hematological:  Negative for adenopathy. Does not bruise/bleed easily.   Psychiatric/Behavioral:  Negative for agitation, confusion, decreased concentration, dysphoric mood, hallucinations, self-injury, sleep disturbance and suicidal ideas. The patient is not nervous/anxious and is not hyperactive.    All other systems reviewed and are negative.    Medical History Reviewed by provider this encounter:  Tobacco  Allergies  Meds  Problems  Med Hx  Surg Hx  Fam Hx       Current Outpatient Medications on File Prior to Visit   Medication Sig Dispense Refill    albuterol (2.5 mg/3 mL) 0.083 % nebulizer solution Take 3 mL (2.5 mg total) by nebulization every 6 (six) hours as needed for wheezing or shortness of breath 300 mL 5    atenolol (TENORMIN) 25 mg tablet take 2 tablet by mouth once daily 180 tablet 3    atorvastatin (LIPITOR) 40 mg tablet take 1 tablet by mouth once daily 90 tablet 3    azelastine (ASTELIN) 0.1 % nasal spray 1 spray into each nostril 2 (two) times a day Use in each nostril as directed (Patient taking differently: 1 spray into each nostril as needed Use in each nostril as directed) 30 mL 5    diazepam (VALIUM) 5 mg tablet Take 1 tablet (5 mg total) by mouth 3 (three) times a day as needed for anxiety 90 tablet 5    ipratropium (ATROVENT) 0.03 % nasal spray 2 sprays into each nostril every 12 (twelve) hours 30 mL 5    Magnesium 500 MG TABS Take 1 tablet by mouth daily      oxyCODONE-acetaminophen (Percocet)  mg per tablet Take 1 tablet by mouth every 6 (six) hours as needed for moderate pain Max Daily Amount: 4 tablets 120 tablet 0    polyethylene glycol-propylene glycol (Systane) 0.4-0.3 % Apply to eye as needed      venlafaxine (EFFEXOR-XR) 150 mg 24 hr capsule take 1 capsule by mouth once daily 90 capsule 3    oxyCODONE-acetaminophen (PERCOCET) 5-325 mg per tablet Take 1 tablet by mouth every 6 (six) hours as needed for moderate pain Max Daily Amount: 4 tablets (Patient not  "taking: Reported on 7/30/2024) 120 tablet 0    [DISCONTINUED] Combivent Respimat inhaler Inhale 1 puff 4 (four) times a day (Patient not taking: Reported on 4/12/2024) 4 g 5    [DISCONTINUED] oxyCODONE-acetaminophen (Percocet)  mg per tablet Take 1 tablet by mouth every 4 (four) hours as needed for moderate pain Max Daily Amount: 6 tablets (Patient not taking: Reported on 4/12/2024) 120 tablet 0    [DISCONTINUED] umeclidinium-vilanterol 62.5-25 mcg/actuation inhaler Inhale 1 puff daily (Patient not taking: Reported on 4/12/2024) 180 blister 3     No current facility-administered medications on file prior to visit.      Social History     Tobacco Use    Smoking status: Every Day     Current packs/day: 0.50     Average packs/day: 0.5 packs/day for 35.0 years (17.5 ttl pk-yrs)     Types: Cigarettes    Smokeless tobacco: Never   Vaping Use    Vaping status: Never Used   Substance and Sexual Activity    Alcohol use: No    Drug use: No    Sexual activity: Not on file     Objective     /70 (BP Location: Right arm, Patient Position: Sitting)   Pulse (!) 51   Temp (!) 96.2 °F (35.7 °C) (Tympanic)   Ht 5' 4\" (1.626 m)   Wt 62.4 kg (137 lb 9.6 oz)   SpO2 95%   BMI 23.62 kg/m²     Physical Exam  Vitals and nursing note reviewed.   Constitutional:       General: She is not in acute distress.     Appearance: Normal appearance. She is well-developed and normal weight. She is not ill-appearing, toxic-appearing or diaphoretic.   HENT:      Head: Normocephalic and atraumatic.      Right Ear: Tympanic membrane normal.      Left Ear: Tympanic membrane normal.      Nose: Nose normal.      Mouth/Throat:      Mouth: Mucous membranes are moist.      Pharynx: Oropharynx is clear.   Eyes:      Conjunctiva/sclera: Conjunctivae normal.      Pupils: Pupils are equal, round, and reactive to light.   Cardiovascular:      Rate and Rhythm: Normal rate and regular rhythm.      Pulses: Normal pulses.      Heart sounds: Normal heart " sounds. No murmur heard.  Pulmonary:      Effort: Pulmonary effort is normal. No respiratory distress.      Breath sounds: Normal breath sounds. No wheezing.   Chest:      Chest wall: No tenderness.   Abdominal:      General: Bowel sounds are normal.      Palpations: Abdomen is soft. There is no mass.      Tenderness: There is no abdominal tenderness.   Musculoskeletal:         General: Tenderness present. No swelling. Normal range of motion.      Cervical back: Normal range of motion and neck supple. No tenderness.      Right lower leg: No edema.      Left lower leg: No edema.   Lymphadenopathy:      Cervical: No cervical adenopathy.   Skin:     General: Skin is warm and dry.      Capillary Refill: Capillary refill takes less than 2 seconds.      Findings: No erythema, lesion or rash.   Neurological:      General: No focal deficit present.      Mental Status: She is alert and oriented to person, place, and time. Mental status is at baseline.      Motor: No weakness.      Coordination: Coordination normal.      Gait: Gait normal.   Psychiatric:         Mood and Affect: Mood normal.         Behavior: Behavior normal.         Thought Content: Thought content normal.         Judgment: Judgment normal.       Administrative Statements

## 2024-08-05 DIAGNOSIS — M51.9 LUMBAR DISC DISEASE: ICD-10-CM

## 2024-08-05 DIAGNOSIS — F41.9 ANXIETY: ICD-10-CM

## 2024-08-05 RX ORDER — DIAZEPAM 5 MG/1
5 TABLET ORAL 3 TIMES DAILY PRN
Qty: 90 TABLET | Refills: 5 | Status: SHIPPED | OUTPATIENT
Start: 2024-08-05

## 2024-08-05 RX ORDER — OXYCODONE AND ACETAMINOPHEN 10; 325 MG/1; MG/1
1 TABLET ORAL EVERY 6 HOURS PRN
Qty: 120 TABLET | Refills: 0 | Status: SHIPPED | OUTPATIENT
Start: 2024-08-05

## 2024-08-05 NOTE — TELEPHONE ENCOUNTER
Reason for call:   [x] Refill   [] Prior Auth  [] Other:     Office:   [x] PCP/Provider -   [] Specialty/Provider -     OXYCODONE  MG    VALIUM 5 MG    [id number] - [place] - [address]  [address] [address]  Phone: [phone]  Fax: [phone]     Does the patient have enough for 3 days?   [] Yes   [x] No - Send as HP to POD

## 2024-08-07 ENCOUNTER — TELEPHONE (OUTPATIENT)
Dept: FAMILY MEDICINE CLINIC | Facility: CLINIC | Age: 79
End: 2024-08-07

## 2024-08-07 DIAGNOSIS — D72.829 LEUKOCYTOSIS, UNSPECIFIED TYPE: Primary | ICD-10-CM

## 2024-08-07 DIAGNOSIS — R71.8 ELEVATED HEMATOCRIT: ICD-10-CM

## 2024-08-07 DIAGNOSIS — D58.2 ELEVATED HEMOGLOBIN (HCC): ICD-10-CM

## 2024-08-07 DIAGNOSIS — E78.2 MIXED HYPERLIPIDEMIA: Primary | ICD-10-CM

## 2024-08-07 RX ORDER — ATORVASTATIN CALCIUM 80 MG/1
80 TABLET, FILM COATED ORAL DAILY
Qty: 90 TABLET | Refills: 3 | Status: SHIPPED | OUTPATIENT
Start: 2024-08-07

## 2024-08-07 NOTE — TELEPHONE ENCOUNTER
----- Message from Sanjay Gandhi PA-C sent at 8/7/2024  7:13 AM EDT -----  Cholesterol is still elevated.  Will increase Lipitor to 80mg daily.  Let me know if there are any issues with this.  I'll place repeat labs for 3 months.

## 2024-08-07 NOTE — TELEPHONE ENCOUNTER
----- Message from Sanjay Gandhi PA-C sent at 8/7/2024  7:11 AM EDT -----  Blood count is still abnormal.  I will put in lab orders for you to get prior to your appt with Dr. Ramsey.

## 2024-09-10 DIAGNOSIS — M51.9 LUMBAR DISC DISEASE: ICD-10-CM

## 2024-09-10 RX ORDER — OXYCODONE AND ACETAMINOPHEN 10; 325 MG/1; MG/1
1 TABLET ORAL EVERY 6 HOURS PRN
Qty: 120 TABLET | Refills: 0 | Status: SHIPPED | OUTPATIENT
Start: 2024-09-10

## 2024-09-10 NOTE — TELEPHONE ENCOUNTER
Reason for call:   [x] Refill   [] Prior Auth  [] Other:     Office:   [x] PCP/Provider -   [] Specialty/Provider -     Medication: Oxycodone-acetaminophen  mg , take 1 tablet by mouth every 6 hours as needed      Pharmacy: [facility]    Does the patient have enough for 3 days?   [x] Yes   [] No - Send as HP to POD

## 2024-09-18 ENCOUNTER — RA CDI HCC (OUTPATIENT)
Dept: OTHER | Facility: HOSPITAL | Age: 79
End: 2024-09-18

## 2024-09-23 DIAGNOSIS — I10 ESSENTIAL HYPERTENSION: ICD-10-CM

## 2024-09-24 RX ORDER — ATENOLOL 25 MG/1
TABLET ORAL
Qty: 180 TABLET | Refills: 1 | Status: SHIPPED | OUTPATIENT
Start: 2024-09-24

## 2024-09-30 ENCOUNTER — OFFICE VISIT (OUTPATIENT)
Dept: FAMILY MEDICINE CLINIC | Facility: CLINIC | Age: 79
End: 2024-09-30
Payer: COMMERCIAL

## 2024-09-30 VITALS
WEIGHT: 135.1 LBS | SYSTOLIC BLOOD PRESSURE: 138 MMHG | DIASTOLIC BLOOD PRESSURE: 86 MMHG | HEART RATE: 60 BPM | BODY MASS INDEX: 23.19 KG/M2 | OXYGEN SATURATION: 95 % | RESPIRATION RATE: 16 BRPM

## 2024-09-30 DIAGNOSIS — Z00.00 MEDICARE ANNUAL WELLNESS VISIT, SUBSEQUENT: Primary | ICD-10-CM

## 2024-09-30 DIAGNOSIS — J44.1 COPD EXACERBATION (HCC): ICD-10-CM

## 2024-09-30 DIAGNOSIS — F33.9 EPISODE OF RECURRENT MAJOR DEPRESSIVE DISORDER, UNSPECIFIED DEPRESSION EPISODE SEVERITY (HCC): ICD-10-CM

## 2024-09-30 DIAGNOSIS — F32.1 MODERATE MAJOR DEPRESSION, SINGLE EPISODE (HCC): ICD-10-CM

## 2024-09-30 DIAGNOSIS — G89.29 CHRONIC LEFT SHOULDER PAIN: ICD-10-CM

## 2024-09-30 DIAGNOSIS — M25.512 CHRONIC LEFT SHOULDER PAIN: ICD-10-CM

## 2024-09-30 PROCEDURE — G0439 PPPS, SUBSEQ VISIT: HCPCS | Performed by: FAMILY MEDICINE

## 2024-09-30 RX ORDER — DEXAMETHASONE SODIUM PHOSPHATE 10 MG/ML
10 INJECTION INTRAMUSCULAR; INTRAVENOUS ONCE
Status: DISCONTINUED | OUTPATIENT
Start: 2024-09-30 | End: 2024-10-23

## 2024-09-30 RX ORDER — VENLAFAXINE HYDROCHLORIDE 37.5 MG/1
37.5 CAPSULE, EXTENDED RELEASE ORAL DAILY
Qty: 90 CAPSULE | Refills: 3 | Status: SHIPPED | OUTPATIENT
Start: 2024-09-30

## 2024-09-30 NOTE — PROGRESS NOTES
Ambulatory Visit  Name: Stefani Greer      : 1945      MRN: [id number]  Encounter Provider: Serge Ramsey MD  Encounter Date: 2024   Encounter department: [facility]    Assessment & Plan       Preventive health issues were discussed with patient, and age appropriate screening tests were ordered as noted in patient's After Visit Summary. Personalized health advice and appropriate referrals for health education or preventive services given if needed, as noted in patient's After Visit Summary.    History of Present Illness     HPI   Patient Care Team:  Serge Ramsey MD as PCP - General  Serge Ramsey MD as PCP - PCP-[facility] ([facility])  Serge Ramsey MD as PCP - PCP-[facility] ([facility])  Serge Ramsey MD as PCP - Family Medicine (Family Medicine)    Review of Systems  Medical History Reviewed by provider this encounter:       Annual Wellness Visit Questionnaire   Last Medicare Wellness visit information reviewed, patient interviewed and updates made to the record today.      Health Risk Assessment:   Patient rates overall health as good. Patient feels that their physical health rating is same. Patient is very satisfied with their life. Eyesight was rated as slightly worse. Hearing was rated as slightly worse. Patient feels that their emotional and mental health rating is same. Patients states they are never, rarely angry. Patient states they are always unusually tired/fatigued. Pain experienced in the last 7 days has been some. Patient's pain rating has been 7/10. Patient states that she has experienced no weight loss or gain in last 6 months.     Depression Screening:   PHQ-9 Score: 6      Fall Risk Screening:   In the past year, patient has experienced: history of falling in past year    Number of falls: 1  Injured during fall?: No    Feels unsteady when standing or walking?: Yes    Worried about falling?: No      Urinary Incontinence Screening:   Patient has not  leaked urine accidently in the last six months.     Home Safety:  Patient does not have trouble with stairs inside or outside of their home. Patient has working smoke alarms Home safety hazards include: none.     Activities of Daily Living (ADLs)/Instrumental Activities of Daily Living (IADLs):   Walk and transfer into and out of bed and chair?: Yes  Dress and groom yourself?: Yes    Bathe or shower yourself?: Yes    Feed yourself? Yes  Do your laundry/housekeeping?: Yes  Manage your money, pay your bills and track your expenses?: Yes  Make your own meals?: Yes    Do your own shopping?: Yes    Advance Care Planning:     Durable POA for healthcare: Yes      PREVENTIVE SCREENINGS      Cardiovascular Screening:    General: Screening Not Indicated and History Lipid Disorder      Diabetes Screening:     General: Screening Current      Colorectal Cancer Screening:     General: Screening Current      Breast Cancer Screening:     General: Screening Current      Cervical Cancer Screening:    General: Screening Not Indicated and History Cervical Cancer      Abdominal Aortic Aneurysm (AAA) Screening:        General: Screening Not Indicated and History AAA      Lung Cancer Screening:     General: Screening Not Indicated      Hepatitis C Screening:    General: Screening Current    Social Determinants of Health     Financial Resource Strain: Low Risk  (9/5/2024)    Received from [employer]    Financial Resource Strain     Do you have any trouble paying for your medications, or do you think you might in the future?: No   Food Insecurity: No Food Insecurity (9/5/2024)    Received from [employer]    Hunger Vital Sign     Worried About Running Out of Food in the Last Year: Never true     Ran Out of Food in the Last Year: Never true   Transportation Needs: No Transportation Needs (9/5/2024)    Received from [employer]    Transportation Needs     READ ONLY Do you have trouble getting a ride to medical visits or work?: Never True     Has  lack of transportation kept you from medical appointments, meetings, work, or from getting things needed for daily living? Check all that apply.: No   Housing Stability: Low Risk  (9/5/2024)    Received from [employer]    Housing Stability     Do you currently live in a shelter or have no steady place to sleep at night?: No     READ ONLY Do you think you are at risk of becoming homeless?: No     Are you homeless or worried that you might be in the future?: No   Utilities: Not At Risk (9/5/2024)    Received from [employer]    Utilities     Do you have trouble paying your heating, water, or electric bill?: No     No results found.    Objective     /86   Pulse 60   Resp 16   Wt 61.3 kg (135 lb 1.6 oz)   SpO2 95%   BMI 23.19 kg/m²     Physical Exam

## 2024-09-30 NOTE — PROGRESS NOTES
Ambulatory Visit  Name: Stefani Greer      : 1945      MRN: [id number]  Encounter Provider: Serge Ramsey MD  Encounter Date: 2024   Encounter department: [facility]    Assessment & Plan       Preventive health issues were discussed with patient, and age appropriate screening tests were ordered as noted in patient's After Visit Summary. Personalized health advice and appropriate referrals for health education or preventive services given if needed, as noted in patient's After Visit Summary.    History of Present Illness     HPI   Patient Care Team:  Serge Ramsey MD as PCP - General  Serge Ramsey MD as PCP - PCP-[facility] ([facility])  Serge Ramsey MD as PCP - PCP-[facility] ([facility])  Serge Ramsey MD as PCP - Family Medicine (Family Medicine)    Review of Systems  Medical History Reviewed by provider this encounter:       Annual Wellness Visit Questionnaire       Depression Screening:   PHQ-9 Score: 6      PREVENTIVE SCREENINGS      Cardiovascular Screening:    General: Screening Not Indicated and History Lipid Disorder      Diabetes Screening:     General: Screening Current      Colorectal Cancer Screening:     General: Screening Current      Breast Cancer Screening:     General: Screening Current      Cervical Cancer Screening:    General: Screening Not Indicated and History Cervical Cancer      Abdominal Aortic Aneurysm (AAA) Screening:        General: Screening Not Indicated and History AAA      Lung Cancer Screening:     General: Screening Not Indicated      Hepatitis C Screening:    General: Screening Current    Social Determinants of Health     Financial Resource Strain: Low Risk  (2024)    Received from [employer]    Financial Resource Strain    • Do you have any trouble paying for your medications, or do you think you might in the future?: No   Food Insecurity: No Food Insecurity (2024)    Received from [employer]    Hunger Vital Sign    • Worried  About Running Out of Food in the Last Year: Never true    • Ran Out of Food in the Last Year: Never true   Transportation Needs: No Transportation Needs (9/5/2024)    Received from [facility]    Transportation Needs    • READ ONLY Do you have trouble getting a ride to medical visits or work?: Never True    • Has lack of transportation kept you from medical appointments, meetings, work, or from getting things needed for daily living? Check all that apply.: No   Housing Stability: Low Risk  (9/5/2024)    Received from [employer]    Housing Stability    • Do you currently live in a shelter or have no steady place to sleep at night?: No    • READ ONLY Do you think you are at risk of becoming homeless?: No    • Are you homeless or worried that you might be in the future?: No   Utilities: Not At Risk (9/5/2024)    Received from [employer]    Utilities    • Do you have trouble paying your heating, water, or electric bill?: No     No results found.    Objective     /86   Pulse 60   Resp 16   Wt 61.3 kg (135 lb 1.6 oz)   SpO2 95%   BMI 23.19 kg/m²     Physical Exam  Administrative Statements   I have spent a total time of 30 minutes in caring for this patient on the day of the visit/encounter including Diagnostic results, Prognosis, Risks and benefits of tx options, Instructions for management, Patient and family education, Importance of tx compliance, Risk factor reductions, Impressions, Counseling / Coordination of care, Documenting in the medical record, Reviewing / ordering tests, medicine, procedures  , Obtaining or reviewing history  , and Communicating with other healthcare professionals .Depression Screening Follow-up Plan: Patient's depression screening was positive with a PHQ-2 score of . Their PHQ-9 score was 6. Patient assessed for underlying major depression. They have no active suicidal ideations. Brief counseling provided and recommend additional follow-up/re-evaluation next office visit.

## 2024-09-30 NOTE — PATIENT INSTRUCTIONS
Medicare Preventive Visit Patient Instructions  Thank you for completing your Welcome to Medicare Visit or Medicare Annual Wellness Visit today. Your next wellness visit will be due in one year (10/1/2025).  The screening/preventive services that you may require over the next 5-10 years are detailed below. Some tests may not apply to you based off risk factors and/or age. Screening tests ordered at today's visit but not completed yet may show as past due. Also, please note that scanned in results may not display below.  Preventive Screenings:  Service Recommendations Previous Testing/Comments   Colorectal Cancer Screening  * Colonoscopy    * Fecal Occult Blood Test (FOBT)/Fecal Immunochemical Test (FIT)  * Fecal DNA/Cologuard Test  * Flexible Sigmoidoscopy Age: 45-75 years old   Colonoscopy: every 10 years (may be performed more frequently if at higher risk)  OR  FOBT/FIT: every 1 year  OR  Cologuard: every 3 years  OR  Sigmoidoscopy: every 5 years  Screening may be recommended earlier than age 45 if at higher risk for colorectal cancer. Also, an individualized decision between you and your healthcare provider will decide whether screening between the ages of 76-85 would be appropriate. Colonoscopy: 07/29/2022  FOBT/FIT: Not on file  Cologuard: Not on file  Sigmoidoscopy: Not on file    Screening Current  Screening Current     Breast Cancer Screening Age: 40+ years old  Frequency: every 1-2 years  Not required if history of left and right mastectomy Mammogram: 02/19/2024    Screening Current  Screening Current   Cervical Cancer Screening Between the ages of 21-29, pap smear recommended once every 3 years.   Between the ages of 30-65, can perform pap smear with HPV co-testing every 5 years.   Recommendations may differ for women with a history of total hysterectomy, cervical cancer, or abnormal pap smears in past. Pap Smear: 11/14/2018    Screening Not Indicated  History Cervical Cancer  Screening Not  Indicated  History Cervical Cancer   Hepatitis C Screening Once for adults born between 1945 and 1965  More frequently in patients at high risk for Hepatitis C Hep C Antibody: 01/08/2019    Screening Current  Screening Current   Diabetes Screening 1-2 times per year if you're at risk for diabetes or have pre-diabetes Fasting glucose: 106 mg/dL (3/2/2020)  A1C: No results in last 5 years (No results in last 5 years)  Screening Current  Screening Current   Cholesterol Screening Once every 5 years if you don't have a lipid disorder. May order more often based on risk factors. Lipid panel: 06/23/2022    Screening Not Indicated  History Lipid Disorder  Screening Not Indicated  History Lipid Disorder     Other Preventive Screenings Covered by Medicare:  Abdominal Aortic Aneurysm (AAA) Screening: covered once if your at risk. You're considered to be at risk if you have a family history of AAA.  Lung Cancer Screening: covers low dose CT scan once per year if you meet all of the following conditions: (1) Age 55-77; (2) No signs or symptoms of lung cancer; (3) Current smoker or have quit smoking within the last 15 years; (4) You have a tobacco smoking history of at least 20 pack years (packs per day multiplied by number of years you smoked); (5) You get a written order from a healthcare provider.  Glaucoma Screening: covered annually if you're considered high risk: (1) You have diabetes OR (2) Family history of glaucoma OR (3)  aged 50 and older OR (4)  American aged 65 and older  Osteoporosis Screening: covered every 2 years if you meet one of the following conditions: (1) You're estrogen deficient and at risk for osteoporosis based off medical history and other findings; (2) Have a vertebral abnormality; (3) On glucocorticoid therapy for more than 3 months; (4) Have primary hyperparathyroidism; (5) On osteoporosis medications and need to assess response to drug therapy.   Last bone density test (DXA  Scan): 01/18/2022.  HIV Screening: covered annually if you're between the age of 15-65. Also covered annually if you are younger than 15 and older than 65 with risk factors for HIV infection. For pregnant patients, it is covered up to 3 times per pregnancy.    Immunizations:  Immunization Recommendations   Influenza Vaccine Annual influenza vaccination during flu season is recommended for all persons aged >= 6 months who do not have contraindications   Pneumococcal Vaccine   * Pneumococcal conjugate vaccine = PCV13 (Prevnar 13), PCV15 (Vaxneuvance), PCV20 (Prevnar 20)  * Pneumococcal polysaccharide vaccine = PPSV23 (Pneumovax) Adults 19-63 yo with certain risk factors or if 65+ yo  If never received any pneumonia vaccine: recommend Prevnar 20 (PCV20)  Give PCV20 if previously received 1 dose of PCV13 or PPSV23   Hepatitis B Vaccine 3 dose series if at intermediate or high risk (ex: diabetes, end stage renal disease, liver disease)   Respiratory syncytial virus (RSV) Vaccine - COVERED BY MEDICARE PART D  * RSVPreF3 (Arexvy) CDC recommends that adults 60 years of age and older may receive a single dose of RSV vaccine using shared clinical decision-making (SCDM)   Tetanus (Td) Vaccine - COST NOT COVERED BY MEDICARE PART B Following completion of primary series, a booster dose should be given every 10 years to maintain immunity against tetanus. Td may also be given as tetanus wound prophylaxis.   Tdap Vaccine - COST NOT COVERED BY MEDICARE PART B Recommended at least once for all adults. For pregnant patients, recommended with each pregnancy.   Shingles Vaccine (Shingrix) - COST NOT COVERED BY MEDICARE PART B  2 shot series recommended in those 19 years and older who have or will have weakened immune systems or those 50 years and older     Health Maintenance Due:      Topic Date Due   • Breast Cancer Screening: Mammogram  02/19/2025   • Colorectal Cancer Screening  07/29/2025   • Hepatitis C Screening  Completed   •  Lung Cancer Screening  Discontinued     Immunizations Due:      Topic Date Due   • COVID-19 Vaccine (1 - 2023-24 season) Never done   • Influenza Vaccine (1) 09/01/2024     Advance Directives   What are advance directives?  Advance directives are legal documents that state your wishes and plans for medical care. These plans are made ahead of time in case you lose your ability to make decisions for yourself. Advance directives can apply to any medical decision, such as the treatments you want, and if you want to donate organs.   What are the types of advance directives?  There are many types of advance directives, and each state has rules about how to use them. You may choose a combination of any of the following:  Living will:  This is a written record of the treatment you want. You can also choose which treatments you do not want, which to limit, and which to stop at a certain time. This includes surgery, medicine, IV fluid, and tube feedings.   Durable power of  for healthcare (DPAHC):  This is a written record that states who you want to make healthcare choices for you when you are unable to make them for yourself. This person, called a proxy, is usually a family member or a friend. You may choose more than 1 proxy.  Do not resuscitate (DNR) order:  A DNR order is used in case your heart stops beating or you stop breathing. It is a request not to have certain forms of treatment, such as CPR. A DNR order may be included in other types of advance directives.  Medical directive:  This covers the care that you want if you are in a coma, near death, or unable to make decisions for yourself. You can list the treatments you want for each condition. Treatment may include pain medicine, surgery, blood transfusions, dialysis, IV or tube feedings, and a ventilator (breathing machine).  Values history:  This document has questions about your views, beliefs, and how you feel and think about life. This information can  help others choose the care that you would choose.  Why are advance directives important?  An advance directive helps you control your care. Although spoken wishes may be used, it is better to have your wishes written down. Spoken wishes can be misunderstood, or not followed. Treatments may be given even if you do not want them. An advance directive may make it easier for your family to make difficult choices about your care.   Fall Prevention    Fall prevention  includes ways to make your home and other areas safer. It also includes ways you can move more carefully to prevent a fall. Health conditions that cause changes in your blood pressure, vision, or muscle strength and coordination may increase your risk for falls. Medicines may also increase your risk for falls if they make you dizzy, weak, or sleepy.   Fall prevention tips:   Stand or sit up slowly.    Use assistive devices as directed.    Wear shoes that fit well and have soles that .    Wear a personal alarm.    Stay active.    Manage your medical conditions.    Home Safety Tips:  Add items to prevent falls in the bathroom.    Keep paths clear.    Install bright lights in your home.    Keep items you use often on shelves within reach.    Paint or place reflective tape on the edges of your stairs.    Urinary Incontinence   Urinary incontinence (UI)  is when you lose control of your bladder. UI develops because your bladder cannot store or empty urine properly. The 3 most common types of UI are stress incontinence, urge incontinence, or both.  Medicines:   May be given to help strengthen your bladder control. Report any side effects of medication to your healthcare provider.  Do pelvic muscle exercises often:  Your pelvic muscles help you stop urinating. Squeeze these muscles tight for 5 seconds, then relax for 5 seconds. Gradually work up to squeezing for 10 seconds. Do 3 sets of 15 repetitions a day, or as directed. This will help strengthen your  pelvic muscles and improve bladder control.  Train your bladder:  Go to the bathroom at set times, such as every 2 hours, even if you do not feel the urge to go. You can also try to hold your urine when you feel the urge to go. For example, hold your urine for 5 minutes when you feel the urge to go. As that becomes easier, hold your urine for 10 minutes.   Self-care:   Keep a UI record.  Write down how often you leak urine and how much you leak. Make a note of what you were doing when you leaked urine.  Drink liquids as directed. You may need to limit the amount of liquid you drink to help control your urine leakage. Do not drink any liquid right before you go to bed. Limit or do not have drinks that contain caffeine or alcohol.   Prevent constipation.  Eat a variety of high-fiber foods. Good examples are high-fiber cereals, beans, vegetables, and whole-grain breads. Walking is the best way to trigger your intestines to have a bowel movement.  Exercise regularly and maintain a healthy weight.  Weight loss and exercise will decrease pressure on your bladder and help you control your leakage.   Use a catheter as directed  to help empty your bladder. A catheter is a tiny, plastic tube that is put into your bladder to drain your urine.   Go to behavior therapy as directed.  Behavior therapy may be used to help you learn to control your urge to urinate.    Cigarette Smoking and Your Health   Risks to your health if you smoke:  Nicotine and other chemicals found in tobacco damage every cell in your body. Even if you are a light smoker, you have an increased risk for cancer, heart disease, and lung disease. If you are pregnant or have diabetes, smoking increases your risk for complications.   Benefits to your health if you stop smoking:   You decrease respiratory symptoms such as coughing, wheezing, and shortness of breath.   You reduce your risk for cancers of the lung, mouth, throat, kidney, bladder, pancreas, stomach,  and cervix. If you already have cancer, you increase the benefits of chemotherapy. You also reduce your risk for cancer returning or a second cancer from developing.   You reduce your risk for heart disease, blood clots, heart attack, and stroke.   You reduce your risk for lung infections, and diseases such as pneumonia, asthma, chronic bronchitis, and emphysema.  Your circulation improves. More oxygen can be delivered to your body. If you have diabetes, you lower your risk for complications, such as kidney, artery, and eye diseases. You also lower your risk for nerve damage. Nerve damage can lead to amputations, poor vision, and blindness.  You improve your body's ability to heal and to fight infections.  For more information and support to stop smoking:   [employer]  Phone: 2- 457 - 477-2078  Web Address: www.[employer]     © Copyright [employer] 2018 Information is for End User's use only and may not be sold, redistributed or otherwise used for commercial purposes. All illustrations and images included in CareNotes® are the copyrighted property of [employer] or [employer]      Medicare Preventive Visit Patient Instructions  Thank you for completing your Welcome to Medicare Visit or Medicare Annual Wellness Visit today. Your next wellness visit will be due in one year (10/1/2025).  The screening/preventive services that you may require over the next 5-10 years are detailed below. Some tests may not apply to you based off risk factors and/or age. Screening tests ordered at today's visit but not completed yet may show as past due. Also, please note that scanned in results may not display below.  Preventive Screenings:  Service Recommendations Previous Testing/Comments   Colorectal Cancer Screening  * Colonoscopy    * Fecal Occult Blood Test (FOBT)/Fecal Immunochemical Test (FIT)  * Fecal DNA/Cologuard Test  * Flexible Sigmoidoscopy Age: 45-75 years old   Colonoscopy: every 10 years (may be  performed more frequently if at higher risk)  OR  FOBT/FIT: every 1 year  OR  Cologuard: every 3 years  OR  Sigmoidoscopy: every 5 years  Screening may be recommended earlier than age 45 if at higher risk for colorectal cancer. Also, an individualized decision between you and your healthcare provider will decide whether screening between the ages of 76-85 would be appropriate. Colonoscopy: 07/29/2022  FOBT/FIT: Not on file  Cologuard: Not on file  Sigmoidoscopy: Not on file    Screening Current  Screening Current     Breast Cancer Screening Age: 40+ years old  Frequency: every 1-2 years  Not required if history of left and right mastectomy Mammogram: 02/19/2024    Screening Current  Screening Current   Cervical Cancer Screening Between the ages of 21-29, pap smear recommended once every 3 years.   Between the ages of 30-65, can perform pap smear with HPV co-testing every 5 years.   Recommendations may differ for women with a history of total hysterectomy, cervical cancer, or abnormal pap smears in past. Pap Smear: 11/14/2018    Screening Not Indicated  History Cervical Cancer  Screening Not Indicated  History Cervical Cancer   Hepatitis C Screening Once for adults born between 1945 and 1965  More frequently in patients at high risk for Hepatitis C Hep C Antibody: 01/08/2019    Screening Current  Screening Current   Diabetes Screening 1-2 times per year if you're at risk for diabetes or have pre-diabetes Fasting glucose: 106 mg/dL (3/2/2020)  A1C: No results in last 5 years (No results in last 5 years)  Screening Current  Screening Current   Cholesterol Screening Once every 5 years if you don't have a lipid disorder. May order more often based on risk factors. Lipid panel: 06/23/2022    Screening Not Indicated  History Lipid Disorder  Screening Not Indicated  History Lipid Disorder     Other Preventive Screenings Covered by Medicare:  Abdominal Aortic Aneurysm (AAA) Screening: covered once if your at risk. You're  considered to be at risk if you have a family history of AAA.  Lung Cancer Screening: covers low dose CT scan once per year if you meet all of the following conditions: (1) Age 55-77; (2) No signs or symptoms of lung cancer; (3) Current smoker or have quit smoking within the last 15 years; (4) You have a tobacco smoking history of at least 20 pack years (packs per day multiplied by number of years you smoked); (5) You get a written order from a healthcare provider.  Glaucoma Screening: covered annually if you're considered high risk: (1) You have diabetes OR (2) Family history of glaucoma OR (3)  aged 50 and older OR (4)  American aged 65 and older  Osteoporosis Screening: covered every 2 years if you meet one of the following conditions: (1) You're estrogen deficient and at risk for osteoporosis based off medical history and other findings; (2) Have a vertebral abnormality; (3) On glucocorticoid therapy for more than 3 months; (4) Have primary hyperparathyroidism; (5) On osteoporosis medications and need to assess response to drug therapy.   Last bone density test (DXA Scan): 01/18/2022.  HIV Screening: covered annually if you're between the age of 15-65. Also covered annually if you are younger than 15 and older than 65 with risk factors for HIV infection. For pregnant patients, it is covered up to 3 times per pregnancy.    Immunizations:  Immunization Recommendations   Influenza Vaccine Annual influenza vaccination during flu season is recommended for all persons aged >= 6 months who do not have contraindications   Pneumococcal Vaccine   * Pneumococcal conjugate vaccine = PCV13 (Prevnar 13), PCV15 (Vaxneuvance), PCV20 (Prevnar 20)  * Pneumococcal polysaccharide vaccine = PPSV23 (Pneumovax) Adults 19-63 yo with certain risk factors or if 65+ yo  If never received any pneumonia vaccine: recommend Prevnar 20 (PCV20)  Give PCV20 if previously received 1 dose of PCV13 or PPSV23   Hepatitis B  Vaccine 3 dose series if at intermediate or high risk (ex: diabetes, end stage renal disease, liver disease)   Respiratory syncytial virus (RSV) Vaccine - COVERED BY MEDICARE PART D  * RSVPreF3 (Arexvy) CDC recommends that adults 60 years of age and older may receive a single dose of RSV vaccine using shared clinical decision-making (SCDM)   Tetanus (Td) Vaccine - COST NOT COVERED BY MEDICARE PART B Following completion of primary series, a booster dose should be given every 10 years to maintain immunity against tetanus. Td may also be given as tetanus wound prophylaxis.   Tdap Vaccine - COST NOT COVERED BY MEDICARE PART B Recommended at least once for all adults. For pregnant patients, recommended with each pregnancy.   Shingles Vaccine (Shingrix) - COST NOT COVERED BY MEDICARE PART B  2 shot series recommended in those 19 years and older who have or will have weakened immune systems or those 50 years and older     Health Maintenance Due:      Topic Date Due   • Breast Cancer Screening: Mammogram  02/19/2025   • Colorectal Cancer Screening  07/29/2025   • Hepatitis C Screening  Completed   • Lung Cancer Screening  Discontinued     Immunizations Due:      Topic Date Due   • COVID-19 Vaccine (1 - 2023-24 season) Never done   • Influenza Vaccine (1) 09/01/2024     Advance Directives   What are advance directives?  Advance directives are legal documents that state your wishes and plans for medical care. These plans are made ahead of time in case you lose your ability to make decisions for yourself. Advance directives can apply to any medical decision, such as the treatments you want, and if you want to donate organs.   What are the types of advance directives?  There are many types of advance directives, and each state has rules about how to use them. You may choose a combination of any of the following:  Living will:  This is a written record of the treatment you want. You can also choose which treatments you do not  want, which to limit, and which to stop at a certain time. This includes surgery, medicine, IV fluid, and tube feedings.   Durable power of  for healthcare (DPAHC):  This is a written record that states who you want to make healthcare choices for you when you are unable to make them for yourself. This person, called a proxy, is usually a family member or a friend. You may choose more than 1 proxy.  Do not resuscitate (DNR) order:  A DNR order is used in case your heart stops beating or you stop breathing. It is a request not to have certain forms of treatment, such as CPR. A DNR order may be included in other types of advance directives.  Medical directive:  This covers the care that you want if you are in a coma, near death, or unable to make decisions for yourself. You can list the treatments you want for each condition. Treatment may include pain medicine, surgery, blood transfusions, dialysis, IV or tube feedings, and a ventilator (breathing machine).  Values history:  This document has questions about your views, beliefs, and how you feel and think about life. This information can help others choose the care that you would choose.  Why are advance directives important?  An advance directive helps you control your care. Although spoken wishes may be used, it is better to have your wishes written down. Spoken wishes can be misunderstood, or not followed. Treatments may be given even if you do not want them. An advance directive may make it easier for your family to make difficult choices about your care.   Fall Prevention    Fall prevention  includes ways to make your home and other areas safer. It also includes ways you can move more carefully to prevent a fall. Health conditions that cause changes in your blood pressure, vision, or muscle strength and coordination may increase your risk for falls. Medicines may also increase your risk for falls if they make you dizzy, weak, or sleepy.   Fall prevention  tips:   Stand or sit up slowly.    Use assistive devices as directed.    Wear shoes that fit well and have soles that .    Wear a personal alarm.    Stay active.    Manage your medical conditions.    Home Safety Tips:  Add items to prevent falls in the bathroom.    Keep paths clear.    Install bright lights in your home.    Keep items you use often on shelves within reach.    Paint or place reflective tape on the edges of your stairs.    Urinary Incontinence   Urinary incontinence (UI)  is when you lose control of your bladder. UI develops because your bladder cannot store or empty urine properly. The 3 most common types of UI are stress incontinence, urge incontinence, or both.  Medicines:   May be given to help strengthen your bladder control. Report any side effects of medication to your healthcare provider.  Do pelvic muscle exercises often:  Your pelvic muscles help you stop urinating. Squeeze these muscles tight for 5 seconds, then relax for 5 seconds. Gradually work up to squeezing for 10 seconds. Do 3 sets of 15 repetitions a day, or as directed. This will help strengthen your pelvic muscles and improve bladder control.  Train your bladder:  Go to the bathroom at set times, such as every 2 hours, even if you do not feel the urge to go. You can also try to hold your urine when you feel the urge to go. For example, hold your urine for 5 minutes when you feel the urge to go. As that becomes easier, hold your urine for 10 minutes.   Self-care:   Keep a UI record.  Write down how often you leak urine and how much you leak. Make a note of what you were doing when you leaked urine.  Drink liquids as directed. You may need to limit the amount of liquid you drink to help control your urine leakage. Do not drink any liquid right before you go to bed. Limit or do not have drinks that contain caffeine or alcohol.   Prevent constipation.  Eat a variety of high-fiber foods. Good examples are high-fiber cereals, beans,  vegetables, and whole-grain breads. Walking is the best way to trigger your intestines to have a bowel movement.  Exercise regularly and maintain a healthy weight.  Weight loss and exercise will decrease pressure on your bladder and help you control your leakage.   Use a catheter as directed  to help empty your bladder. A catheter is a tiny, plastic tube that is put into your bladder to drain your urine.   Go to behavior therapy as directed.  Behavior therapy may be used to help you learn to control your urge to urinate.    Cigarette Smoking and Your Health   Risks to your health if you smoke:  Nicotine and other chemicals found in tobacco damage every cell in your body. Even if you are a light smoker, you have an increased risk for cancer, heart disease, and lung disease. If you are pregnant or have diabetes, smoking increases your risk for complications.   Benefits to your health if you stop smoking:   You decrease respiratory symptoms such as coughing, wheezing, and shortness of breath.   You reduce your risk for cancers of the lung, mouth, throat, kidney, bladder, pancreas, stomach, and cervix. If you already have cancer, you increase the benefits of chemotherapy. You also reduce your risk for cancer returning or a second cancer from developing.   You reduce your risk for heart disease, blood clots, heart attack, and stroke.   You reduce your risk for lung infections, and diseases such as pneumonia, asthma, chronic bronchitis, and emphysema.  Your circulation improves. More oxygen can be delivered to your body. If you have diabetes, you lower your risk for complications, such as kidney, artery, and eye diseases. You also lower your risk for nerve damage. Nerve damage can lead to amputations, poor vision, and blindness.  You improve your body's ability to heal and to fight infections.  For more information and support to stop smoking:   Smokefree.gov  Phone: 2- 133 - 968-8427  Web Address: www.smokefree.gov     ©  "Copyright [employer] 2018 Information is for End User's use only and may not be sold, redistributed or otherwise used for commercial purposes. All illustrations and images included in CareNotes® are the copyrighted property of [employer] or [employer]      Patient Education     Depression in adults   The Basics   Written by the doctors and editors at UpToDate   What is depression? -- Depression is a disorder that makes you sad, but it is different from normal sadness. Depression can make it hard for you to work, study, or do everyday tasks.  What causes depression? -- Depression is caused by problems with chemicals in the brain called \"neurotransmitters.\" Some people might be more likely to have depression if it runs in their family. Other things might also play a role, including hormones, certain health problems, medicines, stress, being mistreated as a child, family problems, and problems with friends or at school or work.  How do I know if I am depressed? -- People with depression feel down most of the time for at least 2 weeks. They also have at least 1 of these 2 symptoms:   They no longer enjoy or care about doing the things that they used to like to do.   They feel sad, down, hopeless, or cranky most of the day, almost every day.  People with depression can also have other symptoms. Examples include:   Changes in your appetite or weight. You might eat too little or too much, or gain or lose weight without trying.   Sleeping too much or too little   Feeling tired or like you have no energy   Feeling guilty, helpless, or like you are worth nothing   Trouble with concentration or memory   Acting restless or have trouble staying still, or moving or speaking more slowly than normal   Repeated thoughts of death or killing yourself  If you think that you might be depressed, see your doctor or nurse. Only someone trained in mental health can tell for sure if you are depressed.  How is depression " "diagnosed? -- Your doctor or nurse will do a physical exam, ask you questions, and might order tests. Depression can have a big impact on your life. Luckily, depression can be treated, and the sooner treatment is started, the better it works.  Get help right away if you are thinking of hurting or killing yourself! -- If you ever feel like you might hurt yourself or someone else, help is available:   In the [place], contact the [employer] Suicide & Crisis Lifeline:   To speak to someone, call or text [employer].   To talk to someone online, go to www.[employer]/chat.   Call your doctor or nurse, and tell them it is urgent.   Call for an ambulance (in the [place] and [place], call 9-1-1).   Go to the emergency department at the nearest hospital.  What are the treatments for depression? -- Your doctor or nurse will work with you to make a treatment plan. Treatment can include:   Helping you learn more about depression   Counseling (with a psychiatrist, psychologist, nurse, or )   Medicines that relieve depression   Creating a plan to limit access to items that you might use to harm yourself   Other treatments that pass magnetic waves or electricity into the brain  In addition to treatment, getting regular physical activity can also help you feel better.  People with depression that is not too severe can get better by taking medicines or talking with a counselor. People with severe depression usually need medicines to get better, and might also need to see a counselor.  Another treatment involves placing a device against the scalp to pass magnetic waves into the brain. This is called \"transcranial magnetic stimulation\" (\"TMS\"). Doctors might suggest TMS if medicines and counseling have not helped.  Some people with severe depression might need a treatment called \"electroconvulsive therapy\" (\"ECT\"). During ECT, doctors pass an electric current through a person's brain in a safe way.  When will I feel better? -- Most treatment " options take a little while to start working.   Many people who take medicines start to feel better within 2 weeks, but it might be 4 to 8 weeks before the medicine has its full effect.   Many people who see a counselor start to feel better within a few weeks, but it might take 8 to 10 weeks to get the greatest benefit.  If the first treatment you try does not help you, tell your doctor or nurse, but do not give up. Some people need to try different treatments or combinations of treatments before they find an approach that works. Your doctor, nurse, or counselor can work with you to find the treatment that is right for you. They can also help you figure out how to cope while you search for the right treatment or are waiting for your treatment to start working.  How do I decide which treatment to have? -- You and your doctor or nurse will need to work together to choose a treatment for you. Medicines might work a little faster than counseling. But medicines can also cause side effects. Plus, some people do not like the idea of taking medicine.  Seeing a counselor involves talking about your feelings. That is also hard for some people.  What if I take medicine for depression and I want to have a baby? -- Some depression medicines can cause problems for an unborn baby. But having untreated depression during pregnancy can also cause problems. If you want to get pregnant, tell your doctor but do not stop taking your medicines. Together, you can plan the safest way for you to have your baby.  It's also important to talk with your doctor if you want to breastfeed after your baby is born. Breastfeeding has lots of benefits for both mother and baby. Some depression medicines are safer than others to use while breastfeeding. But having untreated depression after giving birth can also cause problems, so do not stop taking your medicines. Your doctor can work with you to plan the safest way for you to feed your baby.  All  "topics are updated as new evidence becomes available and our peer review process is complete.  This topic retrieved from [employer] on: Mar 06, 2024.  Topic 62692 Version 22.0  Release: 32.2.4 - C32.64  © 2024 UpToDate, Inc. and/or its affiliates. All rights reserved.  figure 1: Mood disorders caused by problems in the brain     Mooddisorders, such as depression and bipolar disorder, are caused by problems with\"neurotransmitters.\" These are chemicals in the brain that can affect your emotions.Treatments for mood disorders seem to work by changing the levels of certainneurotransmitters.  Graphic 69178 Version 4.0  Consumer Information Use and Disclaimer   Disclaimer: This generalized information is a limited summary of diagnosis, treatment, and/or medication information. It is not meant to be comprehensive and should be used as a tool to help the user understand and/or assess potential diagnostic and treatment options. It does NOT include all information about conditions, treatments, medications, side effects, or risks that may apply to a specific patient. It is not intended to be medical advice or a substitute for the medical advice, diagnosis, or treatment of a health care provider based on the health care provider's examination and assessment of a patient's specific and unique circumstances. Patients must speak with a health care provider for complete information about their health, medical questions, and treatment options, including any risks or benefits regarding use of medications. This information does not endorse any treatments or medications as safe, effective, or approved for treating a specific patient. UpToDate, Inc. and its affiliates disclaim any warranty or liability relating to this information or the use thereof.The use of this information is governed by the Terms of Use, available at https://www.woltersflatevuwer.com/en/know/clinical-effectiveness-terms. 2024© UpToDate, Inc. and its affiliates and/or " licensors. All rights reserved.  Copyright   © 2024 [employer] and/or its affiliates. All rights reserved.

## 2024-10-22 DIAGNOSIS — M51.9 LUMBAR DISC DISEASE: ICD-10-CM

## 2024-10-23 RX ORDER — OXYCODONE AND ACETAMINOPHEN 10; 325 MG/1; MG/1
1 TABLET ORAL EVERY 6 HOURS PRN
Qty: 120 TABLET | Refills: 0 | Status: SHIPPED | OUTPATIENT
Start: 2024-10-23

## 2024-12-05 DIAGNOSIS — M51.9 LUMBAR DISC DISEASE: ICD-10-CM

## 2024-12-05 RX ORDER — OXYCODONE AND ACETAMINOPHEN 10; 325 MG/1; MG/1
1 TABLET ORAL EVERY 6 HOURS PRN
Qty: 120 TABLET | Refills: 0 | Status: SHIPPED | OUTPATIENT
Start: 2024-12-05

## 2024-12-27 ENCOUNTER — OFFICE VISIT (OUTPATIENT)
Dept: FAMILY MEDICINE CLINIC | Facility: CLINIC | Age: 79
End: 2024-12-27
Payer: COMMERCIAL

## 2024-12-27 VITALS
DIASTOLIC BLOOD PRESSURE: 88 MMHG | HEIGHT: 64 IN | BODY MASS INDEX: 22.71 KG/M2 | SYSTOLIC BLOOD PRESSURE: 134 MMHG | WEIGHT: 133 LBS | HEART RATE: 56 BPM | RESPIRATION RATE: 18 BRPM | OXYGEN SATURATION: 97 %

## 2024-12-27 DIAGNOSIS — J44.1 COPD EXACERBATION (HCC): Primary | ICD-10-CM

## 2024-12-27 PROCEDURE — 99214 OFFICE O/P EST MOD 30 MIN: CPT | Performed by: PHYSICIAN ASSISTANT

## 2024-12-27 RX ORDER — UMECLIDINIUM BROMIDE AND VILANTEROL TRIFENATATE 62.5; 25 UG/1; UG/1
1 POWDER RESPIRATORY (INHALATION) DAILY
Qty: 60 BLISTER | Refills: 5 | Status: SHIPPED | OUTPATIENT
Start: 2024-12-27

## 2024-12-27 RX ORDER — IPRATROPIUM BROMIDE AND ALBUTEROL 20; 100 UG/1; UG/1
SPRAY, METERED RESPIRATORY (INHALATION)
COMMUNITY
Start: 2024-12-21

## 2024-12-27 RX ORDER — UMECLIDINIUM BROMIDE AND VILANTEROL TRIFENATATE 62.5; 25 UG/1; UG/1
1 POWDER RESPIRATORY (INHALATION)
COMMUNITY
End: 2024-12-27 | Stop reason: SDUPTHER

## 2024-12-27 RX ORDER — IPRATROPIUM BROMIDE 21 UG/1
2 SPRAY, METERED NASAL EVERY 12 HOURS
Qty: 30 ML | Refills: 5 | Status: SHIPPED | OUTPATIENT
Start: 2024-12-27

## 2024-12-27 RX ORDER — METHYLPREDNISOLONE 4 MG/1
TABLET ORAL
Qty: 21 EACH | Refills: 0 | Status: SHIPPED | OUTPATIENT
Start: 2024-12-27

## 2024-12-27 NOTE — PROGRESS NOTES
"Name: Stefani Greer      : 1945      MRN: [id number]  Encounter Provider: Silvia Peoples PA-C  Encounter Date: 2024   Encounter department: [facility]  :  Assessment & Plan  COPD exacerbation (HCC)    Orders:    ipratropium (ATROVENT) 0.03 % nasal spray; 2 sprays into each nostril every 12 (twelve) hours    umeclidinium-vilanterol (Anoro Ellipta) 62.5-25 mcg/actuation inhaler; Inhale 1 puff daily    amoxicillin-clavulanate (AUGMENTIN) 875-125 mg per tablet; Take 1 tablet by mouth every 12 (twelve) hours for 7 days    methylPREDNISolone 4 MG tablet therapy pack; Use as directed on package           History of Present Illness     80 y/o female PMH COPD presents for 1+ week of increased cough, congestion SOB and foul smell. The smell is her most concerning part. She tells me she is uing her combavent inhaler every 6 hours. She is not taking her anoro as she does not have any refills. She has more SOB with minimal activity. No fever, chills.       Review of Systems   Constitutional:  Positive for activity change and fatigue. Negative for appetite change, chills, diaphoresis and fever.   HENT:  Positive for congestion, postnasal drip, rhinorrhea and sinus pressure. Negative for ear pain, sinus pain and sneezing.    Respiratory:  Positive for cough, chest tightness, shortness of breath and wheezing.    Cardiovascular:  Negative for chest pain.   Gastrointestinal:  Negative for abdominal distention.       Objective   /88 (BP Location: Left arm, Patient Position: Sitting, Cuff Size: Large)   Pulse 56   Resp 18   Ht 5' 4\" (1.626 m)   Wt 60.3 kg (133 lb)   SpO2 97%   BMI 22.83 kg/m²      Physical Exam  Vitals and nursing note reviewed.   Constitutional:       General: She is not in acute distress.     Appearance: Normal appearance. She is well-developed.   HENT:      Head: Normocephalic and atraumatic.      Right Ear: A middle ear effusion is present.      Left Ear: A " middle ear effusion is present.      Nose: Congestion and rhinorrhea present.   Eyes:      Conjunctiva/sclera: Conjunctivae normal.   Cardiovascular:      Rate and Rhythm: Normal rate and regular rhythm.      Heart sounds: No murmur heard.  Pulmonary:      Effort: Pulmonary effort is normal. No respiratory distress.      Breath sounds: Examination of the right-upper field reveals wheezing. Examination of the left-upper field reveals wheezing. Wheezing present.      Comments: Scant end exp wheeze  Abdominal:      Palpations: Abdomen is soft.      Tenderness: There is no abdominal tenderness.   Musculoskeletal:         General: No swelling.      Cervical back: Neck supple.   Skin:     General: Skin is warm and dry.      Capillary Refill: Capillary refill takes less than 2 seconds.   Neurological:      Mental Status: She is alert.   Psychiatric:         Mood and Affect: Mood normal.       Administrative Statements   I have spent a total time of 30 minutes in caring for this patient on the day of the visit/encounter including Diagnostic results, Prognosis, Risks and benefits of tx options, Instructions for management, Patient and family education, Importance of tx compliance, Risk factor reductions, Impressions, Counseling / Coordination of care, Documenting in the medical record, Reviewing / ordering tests, medicine, procedures  , and Obtaining or reviewing history  .

## 2024-12-27 NOTE — ASSESSMENT & PLAN NOTE
Orders:    ipratropium (ATROVENT) 0.03 % nasal spray; 2 sprays into each nostril every 12 (twelve) hours    umeclidinium-vilanterol (Anoro Ellipta) 62.5-25 mcg/actuation inhaler; Inhale 1 puff daily    amoxicillin-clavulanate (AUGMENTIN) 875-125 mg per tablet; Take 1 tablet by mouth every 12 (twelve) hours for 7 days    methylPREDNISolone 4 MG tablet therapy pack; Use as directed on package

## 2024-12-31 ENCOUNTER — RA CDI HCC (OUTPATIENT)
Dept: OTHER | Facility: HOSPITAL | Age: 79
End: 2024-12-31

## 2024-12-31 NOTE — PROGRESS NOTES
HCC coding opportunities          Chart Reviewed number of suggestions sent to Provider: 4  I77.810, J43.2, I71.43  Recommend removing chronic kidney disease stage 5 diagnosis from the CMS-HCC list.  Patient's eGFR values are in the normal range.      Patients Insurance     Medicare Insurance: [facility] Medicare Advantage

## 2025-01-13 ENCOUNTER — OFFICE VISIT (OUTPATIENT)
Dept: URGENT CARE | Facility: CLINIC | Age: 80
End: 2025-01-13
Payer: COMMERCIAL

## 2025-01-13 ENCOUNTER — APPOINTMENT (OUTPATIENT)
Dept: RADIOLOGY | Facility: CLINIC | Age: 80
End: 2025-01-13
Payer: COMMERCIAL

## 2025-01-13 VITALS
RESPIRATION RATE: 16 BRPM | SYSTOLIC BLOOD PRESSURE: 130 MMHG | BODY MASS INDEX: 22.71 KG/M2 | WEIGHT: 133 LBS | HEART RATE: 54 BPM | OXYGEN SATURATION: 97 % | TEMPERATURE: 96.4 F | HEIGHT: 64 IN | DIASTOLIC BLOOD PRESSURE: 74 MMHG

## 2025-01-13 DIAGNOSIS — S93.401A SPRAIN OF RIGHT ANKLE, UNSPECIFIED LIGAMENT, INITIAL ENCOUNTER: Primary | ICD-10-CM

## 2025-01-13 DIAGNOSIS — S99.921A INJURY OF RIGHT FOOT, INITIAL ENCOUNTER: ICD-10-CM

## 2025-01-13 PROCEDURE — 99213 OFFICE O/P EST LOW 20 MIN: CPT

## 2025-01-13 PROCEDURE — 73630 X-RAY EXAM OF FOOT: CPT

## 2025-01-13 PROCEDURE — S9088 SERVICES PROVIDED IN URGENT: HCPCS

## 2025-01-13 PROCEDURE — 73610 X-RAY EXAM OF ANKLE: CPT

## 2025-01-13 NOTE — PROGRESS NOTES
[facility]        NAME: Stefani Greer is a 79 y.o. female  : 1945    MRN: [id number]  DATE: 2025  TIME: 1:26 PM    Assessment and Plan   Sprain of right ankle, unspecified ligament, initial encounter [S93.401A]  1. Sprain of right ankle, unspecified ligament, initial encounter  XR foot 3+ vw right    XR ankle 3+ vw right    Ambulatory Referral to Orthopedic Surgery        XR RIGHT Foot: No obvious abnormalities seen on preliminary XR reading. Official radiology report pending.     XR RIGHT Ankle: No obvious abnormalities seen on preliminary XR reading. Official radiology report pending.     RICE method discussed with patient. CAM boot applied with appropriate paperwork completed. Tylenol/ibuprofen for pain/fever. Follow up with orthopedics. Crutches offered however patient declined at this time. Patient & family in agreement with plan.     Patient Instructions   Use tylenol and/or ibuprofen for pain. You may also purchase 4% lidocaine patches over the counter for use at home (available at [employer], [employer], [employer], etc.). If you received any NSAID medications in the clinic (such as a toradol injection or ibuprofen) do not take any NSAID containing products (ibuprofen/advil/aleve) for the next 6 hours.     RICE! = Rest, Ice, Compression (brace, ACE wrap), elevation.    Ice for 20 minutes at a time, 3-4 times per day for 3 days.    Insulate the skin from the ice to prevent frostbite.    Follow up with orthopedics.     Follow up with PCP in 3-5 days.  Proceed to  ER if symptoms worsen.    If tests have been performed at [facility], our office will contact you with results if changes need to be made to the care plan discussed with you at the visit.  [facility]    Chief Complaint     Chief Complaint   Patient presents with    Fall     Fell yesterday onto carpet and hurt right ankle and foot. Right foot and ankle bruised and swollen and painful Did use ice          History of Present Illness       Patient is a 79 year old female who presents today for evaluation of RIGHT sided ankle & foot pain after sustaining a fall yesterday. She states that she fell asleep with her feet propped up and crossed on the coffee table and did not realize that her foot was asleep. Upon attempting to stand up she fell injuring her RIGHT ankle and foot. She states that she believes she did strike her head, although denies any LOC. Does not take any blood thinning medications. She states that after falling, she was able to get up on her own without assistance, and states that her RIGHT ankle and foot began to swell immediately. She states that she is experiencing numbness and tingling in the affected foot. Reports her pain as being 7/10, intermittent, and sharp. She reports only experiencing pain during ambulation or when touching the affected area. She is able to ambulate but not without difficulty d/t pain. She has tried using both ice and excedrin which she states did help to relieve her pain.           Fall  The accident occurred 12 to 24 hours ago. The fall occurred while walking and while standing. She fell from a height of 3 to 5 ft. She landed on Carpet. There was no blood loss. The pain is present in the right foot (RIGHT ankle & foot). The pain is at a severity of 7/10. The pain is moderate. The symptoms are aggravated by ambulation, pressure on injury and standing. Associated symptoms include numbness and tingling. Pertinent negatives include no abdominal pain, fever, headaches, loss of consciousness, nausea or vomiting. She has tried ice and acetaminophen for the symptoms. The treatment provided moderate relief.       Review of Systems   Review of Systems   Constitutional:  Positive for activity change. Negative for appetite change, chills, diaphoresis, fatigue and fever.   Respiratory:  Negative for cough, chest tightness and shortness of breath.    Cardiovascular:  Negative for  chest pain and palpitations.   Gastrointestinal:  Negative for abdominal pain, nausea and vomiting.   Musculoskeletal:  Positive for arthralgias, gait problem and joint swelling. Negative for back pain, myalgias, neck pain and neck stiffness.   Skin:  Positive for color change. Negative for pallor, rash and wound.   Neurological:  Positive for tingling and numbness. Negative for dizziness, loss of consciousness, weakness, light-headedness and headaches.   All other systems reviewed and are negative.        Current Medications       Current Outpatient Medications:     albuterol (2.5 mg/3 mL) 0.083 % nebulizer solution, Take 3 mL (2.5 mg total) by nebulization every 6 (six) hours as needed for wheezing or shortness of breath, Disp: 300 mL, Rfl: 5    atenolol (TENORMIN) 25 mg tablet, take 2 tablet by mouth once daily, Disp: 180 tablet, Rfl: 1    Combivent Respimat inhaler, inhale 1 puff by mouth and INTO THE LUNGS every 6 hours if needed for WHEEZING, Disp: , Rfl:     diazepam (VALIUM) 5 mg tablet, Take 1 tablet (5 mg total) by mouth 3 (three) times a day as needed for anxiety, Disp: 90 tablet, Rfl: 5    ipratropium (ATROVENT) 0.03 % nasal spray, 2 sprays into each nostril every 12 (twelve) hours, Disp: 30 mL, Rfl: 5    Magnesium 500 MG TABS, Take 1 tablet by mouth daily, Disp: , Rfl:     oxyCODONE-acetaminophen (Percocet)  mg per tablet, Take 1 tablet by mouth every 6 (six) hours as needed for moderate pain Max Daily Amount: 4 tablets, Disp: 120 tablet, Rfl: 0    polyethylene glycol-propylene glycol (Systane) 0.4-0.3 %, Apply to eye as needed, Disp: , Rfl:     umeclidinium-vilanterol (Anoro Ellipta) 62.5-25 mcg/actuation inhaler, Inhale 1 puff daily, Disp: 60 blister, Rfl: 5    venlafaxine (EFFEXOR-XR) 37.5 mg 24 hr capsule, Take 1 capsule (37.5 mg total) by mouth daily, Disp: 90 capsule, Rfl: 3    atorvastatin (LIPITOR) 80 mg tablet, Take 1 tablet (80 mg total) by mouth daily (Patient not taking: Reported on  "12/27/2024), Disp: 90 tablet, Rfl: 3    methylPREDNISolone 4 MG tablet therapy pack, Use as directed on package (Patient not taking: Reported on 1/13/2025), Disp: 21 each, Rfl: 0    oxyCODONE-acetaminophen (PERCOCET) 5-325 mg per tablet, Take 1 tablet by mouth every 6 (six) hours as needed for moderate pain Max Daily Amount: 4 tablets (Patient not taking: Reported on 12/27/2024), Disp: 120 tablet, Rfl: 0    Current Allergies     Allergies as of 01/13/2025    (No Known Allergies)            The following portions of the patient's history were reviewed and updated as appropriate: allergies, current medications, past family history, past medical history, past social history, past surgical history and problem list.     Past Medical History:   Diagnosis Date    Elevated liver enzymes     last assessed: 12/27/2016    Fracture of rib     last assessed: 08/31/2015    HPV (human papilloma virus) infection     Osteoporosis     Pulmonary nodule     Thyroid nodule        Past Surgical History:   Procedure Laterality Date    APPENDECTOMY      last assessed: 08/31/2015    BACK SURGERY      last assessed: 08/31/2015    CATARACT EXTRACTION      CHOLECYSTECTOMY      last assessed: 08/31/2015    COLONOSCOPY W/ BIOPSIES      COLPOSCOPY      FOOT SURGERY      last assessed: 08/31/2015    TOTAL ABDOMINAL HYSTERECTOMY      last assessed: 08/31/2015; unilateral oopherectomy    TOTAL HIP ARTHROPLASTY Right     last assessed: 08/31/2015; [facility] [place]    US GUIDED THYROID BIOPSY         Family History   Problem Relation Age of Onset    Heart disease Mother         cardiac disorder    Diabetes type II Mother     Heart disease Father         cardiac disorder          Medications have been verified.        Objective   /74   Pulse (!) 54   Temp (!) 96.4 °F (35.8 °C)   Resp 16   Ht 5' 4\" (1.626 m)   Wt 60.3 kg (133 lb)   SpO2 97%   BMI 22.83 kg/m²   No LMP recorded. Patient has had a hysterectomy.       Physical Exam     Physical Exam  Vitals " and nursing note reviewed.   Constitutional:       Appearance: Normal appearance. She is ill-appearing.   HENT:      Nose: Nose normal.      Mouth/Throat:      Mouth: Mucous membranes are moist.      Pharynx: Oropharynx is clear.   Eyes:      Extraocular Movements: Extraocular movements intact.      Pupils: Pupils are equal, round, and reactive to light.   Cardiovascular:      Rate and Rhythm: Regular rhythm. Bradycardia present.      Pulses: Normal pulses.      Heart sounds: Normal heart sounds.   Pulmonary:      Effort: Pulmonary effort is normal. No respiratory distress.   Abdominal:      General: Abdomen is flat.      Palpations: Abdomen is soft.   Musculoskeletal:         General: Swelling, tenderness and signs of injury present. No deformity.      Cervical back: Normal range of motion and neck supple.      Right knee: Normal.      Left knee: Normal.      Right lower leg: Normal. No edema.      Left lower leg: Normal. No edema.      Right ankle: Swelling present. No deformity or ecchymosis. Tenderness present over the lateral malleolus, medial malleolus and ATF ligament. Decreased range of motion.      Right Achilles Tendon: Normal.      Left ankle: Normal.      Right foot: Decreased range of motion. Normal capillary refill. Swelling, tenderness and bony tenderness present. No deformity or crepitus. Normal pulse.      Left foot: Normal.        Legs:       Comments: Generalized tenderness throughout lateral, medial, and anterior aspects of RIGHT ankle. Tenderness along both lateral and medial aspects of RIGHT foot. Full ROM of toes present. Neurovascularly intact. Decreased ROM to RIGHT ankle r/t pain. No visible bruising on exam, however foot does appear to be slightly erythematous. Moderate swelling present throughout RIGHT ankle extending into proximal portion of RIGHT foot.   Skin:     General: Skin is warm and dry.      Capillary Refill: Capillary refill takes less than 2 seconds.      Coloration: Skin is  not pale.      Findings: Erythema present. No bruising or rash.   Neurological:      General: No focal deficit present.      Mental Status: She is alert. Mental status is at baseline.      Sensory: No sensory deficit.      Gait: Gait abnormal (Due to injury.).   Psychiatric:         Mood and Affect: Mood normal.         Behavior: Behavior normal.         Thought Content: Thought content normal.         Judgment: Judgment normal.

## 2025-01-13 NOTE — PATIENT INSTRUCTIONS
Use tylenol and/or ibuprofen for pain. You may also purchase 4% lidocaine patches over the counter for use at home (available at [employer], [employer], [employer], etc.). If you received any NSAID medications in the clinic (such as a toradol injection or ibuprofen) do not take any NSAID containing products (ibuprofen/advil/aleve) for the next 6 hours.     RICE! = Rest, Ice, Compression (brace, ACE wrap), elevation.    Ice for 20 minutes at a time, 3-4 times per day for 3 days.    Insulate the skin from the ice to prevent frostbite.    Follow up with orthopedics.     Follow up with PCP in 3-5 days.  Proceed to  ER if symptoms worsen.    If tests have been performed at [facility], our office will contact you with results if changes need to be made to the care plan discussed with you at the visit.  You can review your full results on [facility] MyChart.

## 2025-01-23 ENCOUNTER — TELEPHONE (OUTPATIENT)
Dept: OTHER | Facility: OTHER | Age: 80
End: 2025-01-23

## 2025-01-23 DIAGNOSIS — M51.9 LUMBAR DISC DISEASE: ICD-10-CM

## 2025-01-23 RX ORDER — OXYCODONE AND ACETAMINOPHEN 10; 325 MG/1; MG/1
1 TABLET ORAL EVERY 6 HOURS PRN
Qty: 120 TABLET | Refills: 0 | Status: SHIPPED | OUTPATIENT
Start: 2025-01-23
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
## 2025-01-27 ENCOUNTER — OFFICE VISIT (OUTPATIENT)
Dept: FAMILY MEDICINE CLINIC | Facility: CLINIC | Age: 80
End: 2025-01-27
Payer: COMMERCIAL

## 2025-01-27 VITALS
HEIGHT: 64 IN | TEMPERATURE: 97.6 F | DIASTOLIC BLOOD PRESSURE: 88 MMHG | BODY MASS INDEX: 23.73 KG/M2 | WEIGHT: 139 LBS | RESPIRATION RATE: 18 BRPM | SYSTOLIC BLOOD PRESSURE: 164 MMHG | OXYGEN SATURATION: 97 % | HEART RATE: 58 BPM

## 2025-01-27 DIAGNOSIS — J44.1 COPD EXACERBATION (HCC): ICD-10-CM

## 2025-01-27 DIAGNOSIS — N18.5 CHRONIC KIDNEY DISEASE, STAGE 5 (HCC): ICD-10-CM

## 2025-01-27 DIAGNOSIS — F02.80 SEMANTIC DEMENTIA WITHOUT BEHAVIORAL DISTURBANCE (HCC): ICD-10-CM

## 2025-01-27 DIAGNOSIS — D68.9 CLOTTING DISORDER (HCC): ICD-10-CM

## 2025-01-27 DIAGNOSIS — M46.1 INFLAMMATION OF SACROILIAC JOINT (HCC): ICD-10-CM

## 2025-01-27 DIAGNOSIS — I10 PRIMARY HYPERTENSION: ICD-10-CM

## 2025-01-27 DIAGNOSIS — F11.20 OPIOID DEPENDENCE, UNCOMPLICATED (HCC): ICD-10-CM

## 2025-01-27 DIAGNOSIS — G31.09 SEMANTIC DEMENTIA WITHOUT BEHAVIORAL DISTURBANCE (HCC): ICD-10-CM

## 2025-01-27 DIAGNOSIS — R55 SYNCOPE, UNSPECIFIED SYNCOPE TYPE: Primary | ICD-10-CM

## 2025-01-27 DIAGNOSIS — F11.20 CONTINUOUS OPIOID DEPENDENCE (HCC): ICD-10-CM

## 2025-01-27 DIAGNOSIS — M54.50 CHRONIC LOW BACK PAIN WITHOUT SCIATICA, UNSPECIFIED BACK PAIN LATERALITY: ICD-10-CM

## 2025-01-27 DIAGNOSIS — G89.29 CHRONIC LEFT SHOULDER PAIN: ICD-10-CM

## 2025-01-27 DIAGNOSIS — F32.1 MODERATE MAJOR DEPRESSION, SINGLE EPISODE (HCC): ICD-10-CM

## 2025-01-27 DIAGNOSIS — M25.512 CHRONIC LEFT SHOULDER PAIN: ICD-10-CM

## 2025-01-27 DIAGNOSIS — I70.0 ATHEROSCLEROSIS OF ABDOMINAL AORTA (HCC): ICD-10-CM

## 2025-01-27 DIAGNOSIS — F03.A4 MILD DEMENTIA WITH ANXIETY, UNSPECIFIED DEMENTIA TYPE (HCC): ICD-10-CM

## 2025-01-27 DIAGNOSIS — G89.29 CHRONIC LOW BACK PAIN WITHOUT SCIATICA, UNSPECIFIED BACK PAIN LATERALITY: ICD-10-CM

## 2025-01-27 DIAGNOSIS — C53.9 MALIGNANT NEOPLASM OF CERVIX, UNSPECIFIED SITE (HCC): ICD-10-CM

## 2025-01-27 PROCEDURE — 99214 OFFICE O/P EST MOD 30 MIN: CPT | Performed by: FAMILY MEDICINE

## 2025-01-27 RX ORDER — AMLODIPINE BESYLATE 5 MG/1
5 TABLET ORAL DAILY
Qty: 90 TABLET | Refills: 3 | Status: SHIPPED | OUTPATIENT
Start: 2025-01-27

## 2025-01-29 NOTE — ASSESSMENT & PLAN NOTE
Lab Results   Component Value Date    EGFR >90 12/31/2024    EGFR 90 08/06/2024    EGFR 85 05/06/2024    CREATININE 0.5 12/31/2024    CREATININE 0.7 08/06/2024    CREATININE 0.7 05/06/2024

## 2025-01-29 NOTE — PROGRESS NOTES
Name: Stefani Greer      : 1945      MRN: [id number]  Encounter Provider: Serge Ramsey MD  Encounter Date: 2025   [facility]  :  Assessment & Plan  Syncope, unspecified syncope type    Orders:    VAS carotid complete study; Future    Ambulatory Referral to Neurology; Future    Moderate major depression, single episode (HCC)  Stable.  Continue current.         Continuous opioid dependence (HCC)         Inflammation of sacroiliac joint (HCC)  Stable. Continue current.         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)         COPD exacerbation (HCC)         Atherosclerosis of abdominal aorta (HCC)         Clotting disorder (HCC)         Opioid dependence, uncomplicated (HCC)         Chronic kidney disease, stage 5 (HCC)  Lab Results   Component Value Date    EGFR >90 2024    EGFR 90 2024    EGFR 85 2024    CREATININE 0.5 2024    CREATININE 0.7 2024    CREATININE 0.7 2024            Mild dementia with anxiety, unspecified dementia type (HCC)    Orders:    Ambulatory Referral to Neurology; Future    Primary hypertension    Orders:    amLODIPine (NORVASC) 5 mg tablet; Take 1 tablet (5 mg total) by mouth daily    Chronic left shoulder pain    Orders:    Ambulatory Referral to Physical Therapy; Future    Chronic low back pain without sciatica, unspecified back pain laterality    Orders:    Ambulatory Referral to Physical Therapy; Future           History of Present Illness   She is doing well.  Her pain is well controlled.  She has no side effects.    Her depression is well controlled on her current regimen.  She has no side effects.      Review of Systems   All other systems reviewed and are negative.      Objective   /88 (BP Location: Right arm, Patient Position: Sitting, Cuff Size: Adult)   Pulse 58   Temp 97.6 °F (36.4 °C) (Tympanic)    "Resp 18   Ht 5' 4\" (1.626 m)   Wt 63 kg (139 lb)   SpO2 97%   BMI 23.86 kg/m²      Physical Exam  Vitals and nursing note reviewed.   Constitutional:       Appearance: Normal appearance. She is normal weight.   Cardiovascular:      Rate and Rhythm: Normal rate and regular rhythm.      Pulses: Normal pulses.      Heart sounds: Normal heart sounds.   Pulmonary:      Effort: Pulmonary effort is normal.      Breath sounds: Normal breath sounds.   Abdominal:      General: Abdomen is flat. Bowel sounds are normal.      Palpations: Abdomen is soft.   Musculoskeletal:         General: Normal range of motion.      Cervical back: Normal range of motion and neck supple.   Skin:     General: Skin is warm and dry.      Capillary Refill: Capillary refill takes less than 2 seconds.   Neurological:      General: No focal deficit present.      Mental Status: She is alert and oriented to person, place, and time. Mental status is at baseline.   Psychiatric:         Mood and Affect: Mood normal.         Behavior: Behavior normal.         Thought Content: Thought content normal.         Judgment: Judgment normal.         "

## 2025-02-12 ENCOUNTER — RESULTS FOLLOW-UP (OUTPATIENT)
Dept: FAMILY MEDICINE CLINIC | Facility: CLINIC | Age: 80
End: 2025-02-12

## 2025-02-28 ENCOUNTER — TELEPHONE (OUTPATIENT)
Age: 80
End: 2025-02-28

## 2025-02-28 ENCOUNTER — TELEPHONE (OUTPATIENT)
Dept: FAMILY MEDICINE CLINIC | Facility: CLINIC | Age: 80
End: 2025-02-28

## 2025-02-28 NOTE — TELEPHONE ENCOUNTER
Nurse calls from [facility]. This group is contracted through [employer]. Patient needs a BP cuff please send to [facility]. Please fax to [phone]. Jyure-[phone]

## 2025-03-02 DIAGNOSIS — I10 PRIMARY HYPERTENSION: Primary | ICD-10-CM

## 2025-03-02 RX ORDER — ADHESIVE BANDAGE 3/4"
BANDAGE TOPICAL DAILY
Qty: 1 EACH | Refills: 0 | Status: SHIPPED | OUTPATIENT
Start: 2025-03-02

## 2025-03-07 DIAGNOSIS — F41.9 ANXIETY: ICD-10-CM

## 2025-03-07 RX ORDER — DIAZEPAM 5 MG/1
5 TABLET ORAL 3 TIMES DAILY PRN
Qty: 90 TABLET | Refills: 5 | Status: SHIPPED | OUTPATIENT
Start: 2025-03-07

## 2025-03-10 DIAGNOSIS — M51.9 LUMBAR DISC DISEASE: ICD-10-CM

## 2025-03-10 RX ORDER — OXYCODONE AND ACETAMINOPHEN 10; 325 MG/1; MG/1
1 TABLET ORAL EVERY 6 HOURS PRN
Qty: 120 TABLET | Refills: 0 | Status: SHIPPED | OUTPATIENT
Start: 2025-03-10

## 2025-03-18 DIAGNOSIS — I10 ESSENTIAL HYPERTENSION: ICD-10-CM

## 2025-03-19 RX ORDER — ATENOLOL 25 MG/1
50 TABLET ORAL DAILY
Qty: 180 TABLET | Refills: 1 | Status: SHIPPED | OUTPATIENT
Start: 2025-03-19

## 2025-03-24 ENCOUNTER — TELEPHONE (OUTPATIENT)
Age: 80
End: 2025-03-24

## 2025-03-24 NOTE — TELEPHONE ENCOUNTER
Received an call requesting for Blood pressure cuff script to be faxed. Did check on the chart and relayed the order was called in to [facility] on 03/02. Will check and update us if need any further help. Thanks

## 2025-03-28 ENCOUNTER — DOCUMENTATION (OUTPATIENT)
Dept: ADMINISTRATIVE | Facility: OTHER | Age: 80
End: 2025-03-28

## 2025-03-28 NOTE — PROGRESS NOTES
03/28/25 1:12 PM    Annual Wellness Visit outreach is not required, patient seen in last 3 months.     Thank you.  Ester Cerda MA  [facility]

## 2025-04-16 ENCOUNTER — RA CDI HCC (OUTPATIENT)
Dept: OTHER | Facility: HOSPITAL | Age: 80
End: 2025-04-16

## 2025-04-17 ENCOUNTER — OFFICE VISIT (OUTPATIENT)
Dept: FAMILY MEDICINE CLINIC | Facility: CLINIC | Age: 80
End: 2025-04-17
Payer: COMMERCIAL

## 2025-04-17 VITALS
OXYGEN SATURATION: 97 % | DIASTOLIC BLOOD PRESSURE: 86 MMHG | HEIGHT: 64 IN | SYSTOLIC BLOOD PRESSURE: 144 MMHG | BODY MASS INDEX: 23.73 KG/M2 | HEART RATE: 52 BPM | WEIGHT: 139 LBS

## 2025-04-17 DIAGNOSIS — N18.5 CHRONIC KIDNEY DISEASE, STAGE 5 (HCC): ICD-10-CM

## 2025-04-17 DIAGNOSIS — I10 ESSENTIAL HYPERTENSION: Primary | ICD-10-CM

## 2025-04-17 DIAGNOSIS — I70.0 ATHEROSCLEROSIS OF ABDOMINAL AORTA (HCC): ICD-10-CM

## 2025-04-17 DIAGNOSIS — J43.2 CENTRILOBULAR EMPHYSEMA (HCC): ICD-10-CM

## 2025-04-17 DIAGNOSIS — R91.8 PULMONARY NODULES: ICD-10-CM

## 2025-04-17 DIAGNOSIS — Z12.31 BREAST CANCER SCREENING BY MAMMOGRAM: ICD-10-CM

## 2025-04-17 PROCEDURE — 99214 OFFICE O/P EST MOD 30 MIN: CPT | Performed by: FAMILY MEDICINE

## 2025-04-17 NOTE — PROGRESS NOTES
"Name: Stefani Greer      : 1945      MRN: [id number]  Encounter Provider: Serge Ramsey MD  Encounter Date: 2025   [facility]  :  Assessment & Plan  Essential hypertension  BP at goal.  Continue current.         Atherosclerosis of abdominal aorta (HCC)  Urged smoking cessation.         Pulmonary nodules  Continue lung cancer screening.         Centrilobular emphysema (HCC)  Urged smoking cessation.         Breast cancer screening by mammogram    Orders:    Mammo screening bilateral w 3d and cad; Future    Chronic kidney disease, stage 5 (HCC)  Lab Results   Component Value Date    EGFR >90 2024    EGFR 90 2024    EGFR 85 2024    CREATININE 0.5 2024    CREATININE 0.7 2024    CREATININE 0.7 2024                   History of Present Illness   She has long-standing LBP for 40+ years.  She is s/p two laminectomy.  She has been through PT and gets injections through Pain Management every 3 months.  She gets no relief from Tylenol, ibuprofen, and/or Aleve.  She failed amitriptyline, gabapentin and Lyrica.  She has no side effects from her current regimen.  She has a signed treatment agreement in the chart.  She has urine toxicology consistent with regular use of her prescribed medications.  There have been no unanticipated results.  I queried the the PDMP and found no red flags.    We have discussed the risks and benefits of opioid-based pain medications and, using a shared-decision making strategy, have determined the benefits outweigh the risks.  With her current regimen she is able to get out of the house and perform light housework.  She feels that she \"would not be able to do much without it.\"     Her BP is fairly well controlled on her current regimen. She has no HA or vision changes.  She has no CP or SOB.        Review of Systems   All other systems reviewed and are negative.      Objective   /86   Pulse " "(!) 52   Ht 5' 4\" (1.626 m)   Wt 63 kg (139 lb)   SpO2 97%   BMI 23.86 kg/m²      Physical Exam  Vitals and nursing note reviewed.   Constitutional:       Appearance: Normal appearance. She is normal weight.   Cardiovascular:      Rate and Rhythm: Normal rate and regular rhythm.      Pulses: Normal pulses.      Heart sounds: Normal heart sounds.   Pulmonary:      Effort: Pulmonary effort is normal.      Breath sounds: Normal breath sounds.   Abdominal:      General: Abdomen is flat. Bowel sounds are normal.      Palpations: Abdomen is soft.   Musculoskeletal:         General: Normal range of motion.      Cervical back: Normal range of motion and neck supple.   Skin:     General: Skin is warm and dry.      Capillary Refill: Capillary refill takes less than 2 seconds.   Neurological:      General: No focal deficit present.      Mental Status: She is alert and oriented to person, place, and time. Mental status is at baseline.   Psychiatric:         Mood and Affect: Mood normal.         Behavior: Behavior normal.         Thought Content: Thought content normal.         Judgment: Judgment normal.         "

## 2025-04-17 NOTE — PROGRESS NOTES
HCC coding opportunities    J44.9, J43.2  GR     Chart Reviewed number of suggestions sent to Provider: 2     Patients Insurance     Medicare Insurance: [facility] [employer]

## 2025-04-24 ENCOUNTER — TELEPHONE (OUTPATIENT)
Dept: FAMILY MEDICINE CLINIC | Facility: CLINIC | Age: 80
End: 2025-04-24

## 2025-04-24 DIAGNOSIS — Z12.31 BREAST CANCER SCREENING BY MAMMOGRAM: Primary | ICD-10-CM

## 2025-04-24 NOTE — TELEPHONE ENCOUNTER
Patient called to have mammo I printed faxed to [facility] at fax [phone]      She is also looking to have a handicap placard form filled out if this can be done?

## 2025-05-05 ENCOUNTER — TELEPHONE (OUTPATIENT)
Dept: FAMILY MEDICINE CLINIC | Facility: CLINIC | Age: 80
End: 2025-05-05

## 2025-05-05 NOTE — TELEPHONE ENCOUNTER
Patient called to have mammo I printed faxed to [facility] at fax [phone]   Thank you!

## 2025-05-07 ENCOUNTER — RA CDI HCC (OUTPATIENT)
Dept: RADIOLOGY | Facility: HOSPITAL | Age: 80
End: 2025-05-07

## 2025-05-07 NOTE — PROGRESS NOTES
HCC coding opportunities       Chart reviewed, no opportunity found: CHART REVIEWED, NO OPPORTUNITY FOUND   [facility] Review     Patients Insurance

## 2025-05-08 DIAGNOSIS — M51.9 LUMBAR DISC DISEASE: ICD-10-CM

## 2025-05-08 RX ORDER — OXYCODONE AND ACETAMINOPHEN 10; 325 MG/1; MG/1
1 TABLET ORAL EVERY 6 HOURS PRN
Qty: 120 TABLET | Refills: 0 | Status: SHIPPED | OUTPATIENT
Start: 2025-05-08

## 2025-05-19 ENCOUNTER — OFFICE VISIT (OUTPATIENT)
Dept: FAMILY MEDICINE CLINIC | Facility: CLINIC | Age: 80
End: 2025-05-19
Payer: COMMERCIAL

## 2025-05-19 VITALS
SYSTOLIC BLOOD PRESSURE: 142 MMHG | DIASTOLIC BLOOD PRESSURE: 86 MMHG | BODY MASS INDEX: 22.53 KG/M2 | OXYGEN SATURATION: 94 % | HEIGHT: 64 IN | HEART RATE: 64 BPM | RESPIRATION RATE: 18 BRPM | WEIGHT: 132 LBS | TEMPERATURE: 97.2 F

## 2025-05-19 DIAGNOSIS — I10 ESSENTIAL HYPERTENSION: Primary | ICD-10-CM

## 2025-05-19 DIAGNOSIS — E46 PROTEIN-CALORIE MALNUTRITION, UNSPECIFIED SEVERITY (HCC): ICD-10-CM

## 2025-05-19 DIAGNOSIS — E78.2 MIXED HYPERLIPIDEMIA: ICD-10-CM

## 2025-05-19 DIAGNOSIS — E04.1 THYROID NODULE: ICD-10-CM

## 2025-05-19 DIAGNOSIS — F33.41 RECURRENT MAJOR DEPRESSIVE DISORDER, IN PARTIAL REMISSION (HCC): ICD-10-CM

## 2025-05-19 DIAGNOSIS — F51.01 PRIMARY INSOMNIA: ICD-10-CM

## 2025-05-19 PROCEDURE — 99214 OFFICE O/P EST MOD 30 MIN: CPT | Performed by: FAMILY MEDICINE

## 2025-05-19 RX ORDER — LOSARTAN POTASSIUM 25 MG/1
25 TABLET ORAL DAILY
Qty: 90 TABLET | Refills: 3 | Status: SHIPPED | OUTPATIENT
Start: 2025-05-19

## 2025-05-19 NOTE — PROGRESS NOTES
:  Assessment & Plan  Essential hypertension  Cut atenolol to 25 mg daily.  Add losartan 25 mg.  Plan to wean atenolol.    Orders:    losartan (COZAAR) 25 mg tablet; Take 1 tablet (25 mg total) by mouth daily    Vitamin B12; Future    CBC (Includes Diff/Plt) (Refl); Future    Comprehensive metabolic panel; Future    TSH, 3rd generation with Free T4 reflex; Future    Lipid panel; Future    Recurrent major depressive disorder, in partial remission (HCC)    Orders:    Ambulatory referral to Psych Services; Future    Vitamin B12; Future    CBC (Includes Diff/Plt) (Refl); Future    Comprehensive metabolic panel; Future    TSH, 3rd generation with Free T4 reflex; Future    Lipid panel; Future    Thyroid nodule    Orders:    TSH, 3rd generation with Free T4 reflex; Future    Mixed hyperlipidemia    Orders:    Vitamin B12; Future    CBC (Includes Diff/Plt) (Refl); Future    Comprehensive metabolic panel; Future    TSH, 3rd generation with Free T4 reflex; Future    Lipid panel; Future    Protein-calorie malnutrition, unspecified severity (HCC)  Malnutrition Findings:                                 BMI Findings:           Body mass index is 22.66 kg/m².       Orders:    Vitamin B12; Future    Concerned about some forgetfulness.  There is strong family history of dementia.  She is interested in discontinuing medications that may cause memory loss.  These include her pain medications, Valium, and atenolol.    Starting with Valium, which she has been on for many years, we will slowly taper over the next several weeks to months.    At the next visit we will either decrease atenolol and add an ARB in its place or begin to taper her pain medication.  This will depend on how she responds to the benzodiazepine taper.    History of Present Illness     Stefani Greer is a 80 y.o. female   HPI  Review of Systems   All other systems reviewed and are negative.    Objective   /86 (BP Location: Left arm, Patient Position: Sitting, Cuff  "Size: Large)   Pulse 64   Temp (!) 97.2 °F (36.2 °C) (Temporal)   Resp 18   Ht 5' 4\" (1.626 m)   Wt 59.9 kg (132 lb)   SpO2 94%   BMI 22.66 kg/m²      Physical Exam  Vitals and nursing note reviewed.   Constitutional:       Appearance: Normal appearance. She is normal weight.     Cardiovascular:      Rate and Rhythm: Normal rate and regular rhythm.      Pulses: Normal pulses.      Heart sounds: Normal heart sounds.   Pulmonary:      Effort: Pulmonary effort is normal.      Breath sounds: Normal breath sounds.   Abdominal:      General: Abdomen is flat.      Palpations: Abdomen is soft.     Musculoskeletal:         General: Normal range of motion.      Cervical back: Normal range of motion and neck supple.     Skin:     General: Skin is warm and dry.      Capillary Refill: Capillary refill takes less than 2 seconds.     Neurological:      General: No focal deficit present.      Mental Status: She is alert and oriented to person, place, and time.     Psychiatric:         Mood and Affect: Mood normal.         Behavior: Behavior normal.         Thought Content: Thought content normal.         Judgment: Judgment normal.           "

## 2025-05-19 NOTE — ASSESSMENT & PLAN NOTE
Orders:    Vitamin B12; Future    CBC (Includes Diff/Plt) (Refl); Future    Comprehensive metabolic panel; Future    TSH, 3rd generation with Free T4 reflex; Future    Lipid panel; Future

## 2025-05-19 NOTE — ASSESSMENT & PLAN NOTE
Cut atenolol to 25 mg daily.  Add losartan 25 mg.  Plan to wean atenolol.    Orders:    losartan (COZAAR) 25 mg tablet; Take 1 tablet (25 mg total) by mouth daily    Vitamin B12; Future    CBC (Includes Diff/Plt) (Refl); Future    Comprehensive metabolic panel; Future    TSH, 3rd generation with Free T4 reflex; Future    Lipid panel; Future

## 2025-05-19 NOTE — LETTER
Valium taper:    Start with 1/2 tablet at night (2.5 MG) x 1 month.    After one month, call the office.     The next step is cutting a 2.5 mg pill in half.

## 2025-05-19 NOTE — ASSESSMENT & PLAN NOTE
Orders:    Ambulatory referral to Psych Services; Future    Vitamin B12; Future    CBC (Includes Diff/Plt) (Refl); Future    Comprehensive metabolic panel; Future    TSH, 3rd generation with Free T4 reflex; Future    Lipid panel; Future

## 2025-05-21 RX ORDER — TRAZODONE HYDROCHLORIDE 50 MG/1
25 TABLET ORAL
Qty: 90 TABLET | Refills: 3 | Status: SHIPPED | OUTPATIENT
Start: 2025-05-21

## 2025-06-03 ENCOUNTER — TELEPHONE (OUTPATIENT)
Age: 80
End: 2025-06-03

## 2025-06-03 NOTE — TELEPHONE ENCOUNTER
Contacted patient in regards to Routine Referral in attempts to verify patient's needs of services and add patient to proper wait list. Writer left  to call intake at [phone]    Preferences:  Closest to her  No provider preference   In person

## 2025-06-17 DIAGNOSIS — I10 ESSENTIAL HYPERTENSION: ICD-10-CM

## 2025-06-17 DIAGNOSIS — F33.9 EPISODE OF RECURRENT MAJOR DEPRESSIVE DISORDER, UNSPECIFIED DEPRESSION EPISODE SEVERITY (HCC): ICD-10-CM

## 2025-06-17 RX ORDER — ATENOLOL 25 MG/1
50 TABLET ORAL DAILY
Qty: 180 TABLET | Refills: 1 | Status: SHIPPED | OUTPATIENT
Start: 2025-06-17

## 2025-06-17 RX ORDER — VENLAFAXINE HYDROCHLORIDE 37.5 MG/1
37.5 CAPSULE, EXTENDED RELEASE ORAL DAILY
Qty: 90 CAPSULE | Refills: 3 | Status: SHIPPED | OUTPATIENT
Start: 2025-06-17

## 2025-06-18 DIAGNOSIS — J44.1 COPD EXACERBATION (HCC): ICD-10-CM

## 2025-06-18 DIAGNOSIS — F41.9 ANXIETY: ICD-10-CM

## 2025-06-18 RX ORDER — DIAZEPAM 5 MG/1
5 TABLET ORAL 3 TIMES DAILY PRN
Qty: 90 TABLET | Refills: 5 | Status: SHIPPED | OUTPATIENT
Start: 2025-06-18

## 2025-06-18 RX ORDER — UMECLIDINIUM BROMIDE AND VILANTEROL TRIFENATATE 62.5; 25 UG/1; UG/1
1 POWDER RESPIRATORY (INHALATION) DAILY
Qty: 60 BLISTER | Refills: 5 | Status: SHIPPED | OUTPATIENT
Start: 2025-06-18

## 2025-07-10 NOTE — PROGRESS NOTES
Patient called requesting a new heel lift prescription as her insurance has changed and she needed a new 1 for her right leg  This was ordered  PAST MEDICAL HISTORY:  Acute UTI     History of ovarian cyst     HTN (hypertension)     Kidney stone     Multiple sclerosis     Other specified abnormal uterine and vaginal bleeding     Sciatica      PAST MEDICAL HISTORY:  Acute UTI     Fibroid uterus     History of ovarian cyst     HTN (hypertension)     Kidney stone     Multiple sclerosis     Other specified abnormal uterine and vaginal bleeding     Sciatica

## 2025-07-21 ENCOUNTER — TELEPHONE (OUTPATIENT)
Dept: FAMILY MEDICINE CLINIC | Facility: CLINIC | Age: 80
End: 2025-07-21

## 2025-07-21 DIAGNOSIS — J44.1 COPD EXACERBATION (HCC): ICD-10-CM

## 2025-07-21 DIAGNOSIS — M51.9 LUMBAR DISC DISEASE: ICD-10-CM

## 2025-07-21 RX ORDER — OXYCODONE AND ACETAMINOPHEN 10; 325 MG/1; MG/1
1 TABLET ORAL EVERY 6 HOURS PRN
Qty: 120 TABLET | Refills: 0 | Status: SHIPPED | OUTPATIENT
Start: 2025-07-21

## 2025-07-21 RX ORDER — UMECLIDINIUM BROMIDE AND VILANTEROL TRIFENATATE 62.5; 25 UG/1; UG/1
1 POWDER RESPIRATORY (INHALATION) DAILY
Qty: 60 BLISTER | Refills: 5 | Status: SHIPPED | OUTPATIENT
Start: 2025-07-21

## 2025-07-21 NOTE — TELEPHONE ENCOUNTER
[facility]    [facility]  Anoro-Ellipta inhaler

## 2025-08-07 ENCOUNTER — DOCUMENTATION (OUTPATIENT)
Dept: ADMINISTRATIVE | Facility: OTHER | Age: 80
End: 2025-08-07

## 2025-08-13 ENCOUNTER — RA CDI HCC (OUTPATIENT)
Dept: OTHER | Facility: HOSPITAL | Age: 80
End: 2025-08-13

## 2025-08-22 ENCOUNTER — OFFICE VISIT (OUTPATIENT)
Age: 80
End: 2025-08-22

## 2025-08-22 ENCOUNTER — DOCUMENTATION (OUTPATIENT)
Age: 80
End: 2025-08-22

## 2025-08-22 VITALS
DIASTOLIC BLOOD PRESSURE: 78 MMHG | SYSTOLIC BLOOD PRESSURE: 136 MMHG | HEIGHT: 64 IN | RESPIRATION RATE: 18 BRPM | OXYGEN SATURATION: 97 % | HEART RATE: 61 BPM | WEIGHT: 130.2 LBS | BODY MASS INDEX: 22.23 KG/M2

## 2025-08-22 DIAGNOSIS — J44.1 COPD EXACERBATION (HCC): ICD-10-CM

## 2025-08-22 DIAGNOSIS — F51.01 PRIMARY INSOMNIA: Primary | ICD-10-CM

## 2025-08-22 DIAGNOSIS — F32.1 MODERATE MAJOR DEPRESSION, SINGLE EPISODE (HCC): ICD-10-CM

## 2025-08-22 DIAGNOSIS — D68.9 CLOTTING DISORDER (HCC): ICD-10-CM

## 2025-08-22 DIAGNOSIS — J43.2 CENTRILOBULAR EMPHYSEMA (HCC): ICD-10-CM

## 2025-08-22 DIAGNOSIS — I10 ESSENTIAL HYPERTENSION: ICD-10-CM

## 2025-08-22 DIAGNOSIS — M46.1 INFLAMMATION OF SACROILIAC JOINT (HCC): ICD-10-CM

## 2025-08-22 PROBLEM — G31.09: Status: RESOLVED | Noted: 2020-11-27 | Resolved: 2025-08-22

## 2025-08-22 PROBLEM — F02.80: Status: RESOLVED | Noted: 2020-11-27 | Resolved: 2025-08-22

## 2025-08-22 RX ORDER — LOSARTAN POTASSIUM 25 MG/1
25 TABLET ORAL DAILY
COMMUNITY
Start: 2025-05-19 | End: 2025-08-22

## 2025-08-22 RX ORDER — DOXEPIN HYDROCHLORIDE 10 MG/1
10 CAPSULE ORAL
Qty: 90 CAPSULE | Refills: 3 | Status: SHIPPED | OUTPATIENT
Start: 2025-08-22